# Patient Record
Sex: FEMALE | Race: WHITE | Employment: UNEMPLOYED | URBAN - NONMETROPOLITAN AREA
[De-identification: names, ages, dates, MRNs, and addresses within clinical notes are randomized per-mention and may not be internally consistent; named-entity substitution may affect disease eponyms.]

---

## 2022-12-20 ENCOUNTER — OUTSIDE SERVICES (OUTPATIENT)
Dept: FAMILY MEDICINE CLINIC | Age: 42
End: 2022-12-20

## 2022-12-20 VITALS
TEMPERATURE: 97.5 F | WEIGHT: 149 LBS | BODY MASS INDEX: 29.1 KG/M2 | SYSTOLIC BLOOD PRESSURE: 128 MMHG | OXYGEN SATURATION: 96 % | DIASTOLIC BLOOD PRESSURE: 68 MMHG | RESPIRATION RATE: 16 BRPM | HEART RATE: 74 BPM

## 2022-12-20 DIAGNOSIS — I10 PRIMARY HYPERTENSION: ICD-10-CM

## 2022-12-20 DIAGNOSIS — Z86.73 HISTORY OF CVA (CEREBROVASCULAR ACCIDENT): Primary | ICD-10-CM

## 2022-12-20 DIAGNOSIS — F42.8 OTHER OBSESSIVE-COMPULSIVE DISORDERS: ICD-10-CM

## 2022-12-20 DIAGNOSIS — I69.354 FLACCID HEMIPLEGIA OF LEFT NONDOMINANT SIDE AS LATE EFFECT OF CEREBRAL INFARCTION (HCC): ICD-10-CM

## 2022-12-20 DIAGNOSIS — E78.00 PURE HYPERCHOLESTEROLEMIA: ICD-10-CM

## 2022-12-20 DIAGNOSIS — M15.8 OTHER OSTEOARTHRITIS INVOLVING MULTIPLE JOINTS: ICD-10-CM

## 2022-12-20 DIAGNOSIS — I69.391 DYSPHAGIA AS LATE EFFECT OF CEREBROVASCULAR ACCIDENT (CVA): ICD-10-CM

## 2022-12-20 DIAGNOSIS — E08.00 DIABETES MELLITUS DUE TO UNDERLYING CONDITION WITH HYPEROSMOLARITY WITHOUT COMA, WITHOUT LONG-TERM CURRENT USE OF INSULIN (HCC): ICD-10-CM

## 2022-12-20 ASSESSMENT — ENCOUNTER SYMPTOMS
WHEEZING: 0
COUGH: 0
DIARRHEA: 0
NAUSEA: 0
RHINORRHEA: 0
SORE THROAT: 0
SHORTNESS OF BREATH: 0
VOMITING: 0
CONSTIPATION: 0
EYE REDNESS: 0

## 2022-12-20 NOTE — PROGRESS NOTES
Zeina Ybarra is a 43 y.o. female who presents today for her medical conditions/complaints as noted below. Chief Complaint   Patient presents with    1 Month Follow-Up     Federally mandated 30 day visit           HPI:     Patient seen and examined today for follow-up on her chronic health conditions. She is in her room in her bed and complains of shoulder pain. Patient does have Tylenol available and it is too soon to take again. Patient has chronic health conditions of CVA, dysphagia, hemiplegia, diabetes, hyperlipidemia, hypertension, obsessive-compulsive disorder, osteoarthritis. Patient was concerned about possibly being in menopause and labs were ordered. These have not been obtained as of yet. We will await the results. Patient does not complain of anything today other than pain in her shoulders. She would like Sarna lotion to her skin and will order this today.     Past Medical History:   Diagnosis Date    Depression     OCD (obsessive compulsive disorder)     Type II or unspecified type diabetes mellitus without mention of complication, not stated as uncontrolled       Past Surgical History:   Procedure Laterality Date    WRIST SURGERY         Family History   Problem Relation Age of Onset    Stroke Mother     Cancer Mother     Other Mother         aneurysm    Diabetes Mother     High Blood Pressure Mother     Diabetes Father        Social History     Tobacco Use    Smoking status: Never     Passive exposure: Yes    Smokeless tobacco: Never   Substance Use Topics    Alcohol use: Yes     Comment: rarely      Allergies   Allergen Reactions    Dust Mite Extract     Molds & Smuts     Ranitidine Hcl        Health Maintenance   Topic Date Due    COVID-19 Vaccine (1) Never done    Varicella vaccine (1 of 2 - 2-dose childhood series) Never done    Depression Monitoring  Never done    HIV screen  Never done    Hepatitis C screen  Never done    Cervical cancer screen  Never done    DTaP/Tdap/Td vaccine (2 - Td or Tdap) 03/12/2020    Lipids  07/09/2020    Flu vaccine (1) Never done    Hepatitis A vaccine  Aged Out    Hib vaccine  Aged Out    Meningococcal (ACWY) vaccine  Aged Out    Pneumococcal 0-64 years Vaccine  Aged Out       Subjective:      Review of Systems   Constitutional:  Negative for chills, fatigue and fever. HENT:  Negative for congestion, rhinorrhea and sore throat. Eyes:  Negative for redness and visual disturbance. Respiratory:  Negative for cough, shortness of breath and wheezing. Cardiovascular:  Negative for chest pain and leg swelling. Gastrointestinal:  Negative for constipation, diarrhea, nausea and vomiting. Endocrine: Negative for polydipsia and polyuria. Genitourinary:  Negative for dysuria, frequency and hematuria. Musculoskeletal:  Positive for arthralgias and gait problem. Negative for myalgias. Skin:  Negative for rash and wound. Allergic/Immunologic: Negative for environmental allergies and immunocompromised state. Neurological:  Positive for weakness. Negative for dizziness, light-headedness and headaches. Hematological:  Does not bruise/bleed easily. Psychiatric/Behavioral:  Negative for dysphoric mood and sleep disturbance. The patient is not nervous/anxious. Objective:     Physical Exam  Vitals and nursing note reviewed. Constitutional:       General: She is not in acute distress. Appearance: She is well-developed. HENT:      Head: Normocephalic and atraumatic. Right Ear: External ear normal.      Left Ear: External ear normal.      Nose: Nose normal.   Eyes:      General: Visual field deficit present. No scleral icterus. Right eye: No discharge. Left eye: No discharge. Conjunctiva/sclera: Conjunctivae normal.   Neck:      Thyroid: No thyromegaly. Vascular: No JVD. Cardiovascular:      Rate and Rhythm: Normal rate and regular rhythm. Heart sounds: Normal heart sounds.    Pulmonary:      Effort: Pulmonary effort is normal. No respiratory distress. Breath sounds: Normal breath sounds. No stridor. No wheezing or rales. Abdominal:      General: Bowel sounds are normal. There is no distension. Palpations: Abdomen is soft. Tenderness: There is no abdominal tenderness. Musculoskeletal:         General: No deformity. Normal range of motion. Right shoulder: No tenderness or bony tenderness. Left shoulder: No tenderness or bony tenderness. Cervical back: Neck supple. No tenderness or bony tenderness. Thoracic back: No spasms, tenderness or bony tenderness. Lumbar back: No tenderness or bony tenderness. Lymphadenopathy:      Cervical: No cervical adenopathy. Upper Body:      Right upper body: No supraclavicular adenopathy. Left upper body: No supraclavicular adenopathy. Skin:     General: Skin is warm and dry. Findings: No erythema or rash. Neurological:      Mental Status: She is alert and oriented to person, place, and time. Cranial Nerves: Cranial nerve deficit, dysarthria and facial asymmetry present. Sensory: Sensory deficit present. Motor: Weakness, atrophy and abnormal muscle tone present. No seizure activity. Psychiatric:         Speech: Speech normal.         Behavior: Behavior normal.     /68   Pulse 74   Temp 97.5 °F (36.4 °C)   Resp 16   Wt 149 lb (67.6 kg)   SpO2 96%   BMI 29.10 kg/m²     Assessment/Plan      1. History of CVA (cerebrovascular accident)    2. Dysphagia as late effect of cerebrovascular accident (CVA)    3. 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 Diabetes mellitus due to underlying condition with hyperosmolarity without coma, without long-term current use of insulin ([address]    5. Pure hypercholesterolemia    6. Primary hypertension    7. Other obsessive-compulsive disorders    8. Other osteoarthritis involving multiple joints    1.   Blood pressures remain controlled. Heart rate has stabilized since increasing metoprolol. Continue this. 2.  Chronic and continue to assist with meals. Her weight is down some but she did have an acute illness recently and was in the hospital.  We will continue to monitor weights. 3.  Continue supportive care. Patient is wheelchair-bound. 4.  Chronic and continue to monitor blood sugars. Continue metformin and Januvia. 5.  Chronic and continue atorvastatin, aspirin, heart healthy diet. Labs as directed. 6.  Blood pressures improved with recent increase in metoprolol. Continue lisinopril. 7.  Controlled without medications. Continue to monitor and support. 8.  Chronic and continue Tylenol. Is complaining of shoulder pain today. Patient seen and examined. History partially obtained by chart review and nursing notes. I have reviewed patient's past medical, surgical, social, and family history and have made updates where appropriate. See facility EMR for updated medication list.      More than 50% of the total visit time must involve counseling/coordination of care. The total visit time encompasses both the face-to-face time spent with the patient at the bedside and the additional time spent on the unit/floor reviewing data, obtaining relevant patient information, and discussing the case with other involved healthcare providers. On this date 12/20/22I have spent 25 minutes reviewing previous notes, test results and face to face with the patient discussing the diagnosis and importance of compliance with the treatment plan as well as documenting on the day of the visit. Resident has HTN, DM, HLD, OA and it is expected to last 12 or more months. These chronic conditions place resident at a significant risk of death, acute exacerbation, decline in function or functional decline. The comprehensive plan was established, implemented, revised or monitored today and were provided a copy if requested from the facility.   I spent 20 minutes reviewing plan. Patient or designee were informed of the role of chronic care management services and gave verbal consent to accept these services.            Electronically signed by Dora Leahy MD on 12/20/2022 at 4:25 PM

## 2023-01-17 ENCOUNTER — OUTSIDE SERVICES (OUTPATIENT)
Dept: FAMILY MEDICINE CLINIC | Age: 43
End: 2023-01-17

## 2023-01-17 VITALS
DIASTOLIC BLOOD PRESSURE: 70 MMHG | HEART RATE: 68 BPM | WEIGHT: 141.2 LBS | OXYGEN SATURATION: 96 % | SYSTOLIC BLOOD PRESSURE: 110 MMHG | BODY MASS INDEX: 27.58 KG/M2 | RESPIRATION RATE: 18 BRPM | TEMPERATURE: 97.6 F

## 2023-01-17 DIAGNOSIS — I10 PRIMARY HYPERTENSION: ICD-10-CM

## 2023-01-17 DIAGNOSIS — Z86.73 HISTORY OF CVA (CEREBROVASCULAR ACCIDENT): Primary | ICD-10-CM

## 2023-01-17 DIAGNOSIS — M15.8 OTHER OSTEOARTHRITIS INVOLVING MULTIPLE JOINTS: ICD-10-CM

## 2023-01-17 DIAGNOSIS — I69.354 FLACCID HEMIPLEGIA OF LEFT NONDOMINANT SIDE AS LATE EFFECT OF CEREBRAL INFARCTION (HCC): ICD-10-CM

## 2023-01-17 DIAGNOSIS — F42.8 OTHER OBSESSIVE-COMPULSIVE DISORDERS: ICD-10-CM

## 2023-01-17 DIAGNOSIS — E78.00 PURE HYPERCHOLESTEROLEMIA: ICD-10-CM

## 2023-01-17 DIAGNOSIS — I69.391 DYSPHAGIA AS LATE EFFECT OF CEREBROVASCULAR ACCIDENT (CVA): ICD-10-CM

## 2023-01-17 DIAGNOSIS — E08.00 DIABETES MELLITUS DUE TO UNDERLYING CONDITION WITH HYPEROSMOLARITY WITHOUT COMA, WITHOUT LONG-TERM CURRENT USE OF INSULIN (HCC): ICD-10-CM

## 2023-01-17 ASSESSMENT — ENCOUNTER SYMPTOMS
VOMITING: 0
DIARRHEA: 0
NAUSEA: 0
SORE THROAT: 0
COUGH: 0
WHEEZING: 0
CONSTIPATION: 0
SHORTNESS OF BREATH: 0
EYE REDNESS: 0
RHINORRHEA: 0

## 2023-01-17 NOTE — PROGRESS NOTES
Michael Guzmán is a 43 y.o. female who presents today for her medical conditions/complaints as noted below. Chief Complaint   Patient presents with    1 Month Follow-Up     Federally mandated 30 day visit           HPI:     Patient seen and examined today for follow-up of chronic health conditions. She is in her room and in her bed and denies any complaints. She is chronic conditions of CVA with dysphagia and aphasia along with hemiplegia. Patient also has diabetes, hyperlipidemia, hypertension, OCD, osteoarthritis. Patient had labs on 1/11/2023 and her hemoglobin A1c was 6.3. She also had a TSH on 1/9/2023 and this was 1.182. She has had no recent falls.     Past Medical History:   Diagnosis Date    Depression     OCD (obsessive compulsive disorder)     Type II or unspecified type diabetes mellitus without mention of complication, not stated as uncontrolled       Past Surgical History:   Procedure Laterality Date    WRIST SURGERY         Family History   Problem Relation Age of Onset    Stroke Mother     Cancer Mother     Other Mother         aneurysm    Diabetes Mother     High Blood Pressure Mother     Diabetes Father        Social History     Tobacco Use    Smoking status: Never     Passive exposure: Yes    Smokeless tobacco: Never   Substance Use Topics    Alcohol use: Yes     Comment: rarely      Allergies   Allergen Reactions    Dust Mite Extract     Molds & Smuts     Ranitidine Hcl        Health Maintenance   Topic Date Due    COVID-19 Vaccine (1) Never done    Varicella vaccine (1 of 2 - 2-dose childhood series) Never done    Diabetic foot exam  Never done    Depression Monitoring  Never done    HIV screen  Never done    Diabetic Alb to Cr ratio (uACR) test  Never done    Diabetic retinal exam  Never done    GFR test (Diabetes, CKD 3-4, OR last GFR 15-59)  Never done    Hepatitis C screen  Never done    Cervical cancer screen  Never done    DTaP/Tdap/Td vaccine (2 - Td or Tdap) 03/12/2020 Lipids  07/09/2020    Flu vaccine (1) Never done    Hepatitis A vaccine  Aged Out    Hib vaccine  Aged Out    Meningococcal (ACWY) vaccine  Aged Out    Pneumococcal 0-64 years Vaccine  Aged Out       Subjective:      Review of Systems   Constitutional:  Negative for chills, fatigue and fever. HENT:  Negative for congestion, rhinorrhea and sore throat. Eyes:  Negative for redness and visual disturbance. Respiratory:  Negative for cough, shortness of breath and wheezing. Cardiovascular:  Negative for chest pain and leg swelling. Gastrointestinal:  Negative for constipation, diarrhea, nausea and vomiting. Endocrine: Negative for polydipsia and polyuria. Genitourinary:  Negative for dysuria, frequency and hematuria. Musculoskeletal:  Negative for arthralgias, gait problem and myalgias. Skin:  Negative for rash and wound. Allergic/Immunologic: Negative for environmental allergies and immunocompromised state. Neurological:  Negative for dizziness, light-headedness and headaches. Hematological:  Does not bruise/bleed easily. Psychiatric/Behavioral:  Negative for dysphoric mood and sleep disturbance. The patient is not nervous/anxious. Objective:     Physical Exam  Vitals and nursing note reviewed. Constitutional:       General: She is not in acute distress. Appearance: She is well-developed. She is ill-appearing (chronically). HENT:      Head: Normocephalic and atraumatic. Right Ear: External ear normal.      Left Ear: External ear normal.      Nose: Nose normal.   Eyes:      General: No scleral icterus. Right eye: No discharge. Left eye: No discharge. Conjunctiva/sclera: Conjunctivae normal.   Neck:      Thyroid: No thyromegaly. Vascular: No JVD. Cardiovascular:      Rate and Rhythm: Normal rate and regular rhythm. Heart sounds: Normal heart sounds. Pulmonary:      Effort: Pulmonary effort is normal. No respiratory distress.       Breath sounds: Normal breath sounds. No stridor. No wheezing or rales. Abdominal:      General: Bowel sounds are normal. There is no distension. Palpations: Abdomen is soft. Tenderness: There is no abdominal tenderness. Musculoskeletal:         General: No deformity. Right shoulder: No tenderness or bony tenderness. Decreased range of motion. Decreased strength. Left shoulder: No tenderness or bony tenderness. Right hand: Decreased range of motion. Decreased strength. Normal strength of finger abduction. Decreased sensation. Cervical back: Neck supple. No tenderness or bony tenderness. Thoracic back: No spasms, tenderness or bony tenderness. Lumbar back: No tenderness or bony tenderness. Right hip: Decreased strength. Right foot: Decreased range of motion. Lymphadenopathy:      Cervical: No cervical adenopathy. Upper Body:      Right upper body: No supraclavicular adenopathy. Left upper body: No supraclavicular adenopathy. Skin:     General: Skin is warm and dry. Findings: No erythema or rash. Neurological:      Mental Status: She is alert and oriented to person, place, and time. Motor: No atrophy, abnormal muscle tone or seizure activity. Psychiatric:         Speech: Speech normal.         Behavior: Behavior normal.     /70   Pulse 68   Temp 97.6 °F (36.4 °C)   Resp 18   Wt 141 lb 3.2 oz (64 kg)   SpO2 96%   BMI 27.58 kg/m²     Assessment/Plan      1. History of CVA (cerebrovascular accident)    2. Dysphagia as late effect of cerebrovascular accident (CVA)    3. 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 Diabetes mellitus due to underlying condition with hyperosmolarity without coma, without long-term current use of insulin ([address]    5. Pure hypercholesterolemia    6. Primary hypertension    7. Other obsessive-compulsive disorders    8.  Other osteoarthritis involving multiple joints    1. Blood pressures remain controlled. Continue supportive care. Continue metoprolol, atorvastatin, aspirin. Patient does have an area on her right buttock and treatment now in place. 2.  Continue to monitor swallow. Weight remains stable but down 8 pounds. Feel reduction in her weight is overall beneficial for her health. 3.  Continue supportive care. Patient is wheelchair-bound. 4.  A1c 6.3 on metformin and Januvia. Continue to monitor labs. 5.  Chronic and continue statin, aspirin, heart healthy diet. Weight loss is beneficial.  6.  Blood pressures controlled on metoprolol. Continue to monitor. 7.  Controlled without medications. 8.  Chronic and continue Tylenol. Denies pain today. Patient seen and examined. History partially obtained by chart review and nursing notes. I have reviewed patient's past medical, surgical, social, and family history and have made updates where appropriate. See facility EMR for updated medication list.      More than 50% of the total visit time must involve counseling/coordination of care. The total visit time encompasses both the face-to-face time spent with the patient at the bedside and the additional time spent on the unit/floor reviewing data, obtaining relevant patient information, and discussing the case with other involved healthcare providers. Resident has DM, HTN, HLD, OA and it is expected to last 12 or more months. These chronic conditions place resident at a significant risk of death, acute exacerbation, decline in function or functional decline. The comprehensive plan was established, implemented, revised or monitored today and were provided a copy if requested from the facility. I spent 20 minutes reviewing plan. Patient or designee were informed of the role of chronic care management services and gave verbal consent to accept these services.            Electronically signed by ERROL Fonseca CNP on 1/17/2023 at 10:27 AM

## 2023-03-13 ENCOUNTER — TELEPHONE (OUTPATIENT)
Dept: FAMILY MEDICINE CLINIC | Age: 43
End: 2023-03-13

## 2024-03-28 ENCOUNTER — TELEPHONE (OUTPATIENT)
Age: 44
End: 2024-03-28

## 2024-05-09 ENCOUNTER — HOSPITAL ENCOUNTER (OUTPATIENT)
Dept: MRI IMAGING | Age: 44
Discharge: HOME | End: 2024-05-09
Attending: RADIOLOGY

## 2024-05-09 ENCOUNTER — OFFICE VISIT (OUTPATIENT)
Dept: NEUROSURGERY | Age: 44
End: 2024-05-09
Payer: MEDICARE

## 2024-05-09 VITALS
WEIGHT: 131.2 LBS | HEIGHT: 60 IN | BODY MASS INDEX: 25.76 KG/M2 | DIASTOLIC BLOOD PRESSURE: 88 MMHG | HEART RATE: 72 BPM | SYSTOLIC BLOOD PRESSURE: 136 MMHG

## 2024-05-09 DIAGNOSIS — Z00.6 EXAMINATION FOR NORMAL COMPARISON FOR CLINICAL RESEARCH: ICD-10-CM

## 2024-05-09 DIAGNOSIS — D32.9 MENINGIOMA (HCC): Primary | ICD-10-CM

## 2024-05-09 PROCEDURE — 99204 OFFICE O/P NEW MOD 45 MIN: CPT

## 2024-05-09 RX ORDER — TRIAMCINOLONE ACETONIDE 5 MG/G
0.5 CREAM TOPICAL 2 TIMES DAILY
COMMUNITY
Start: 2023-07-20

## 2024-05-09 RX ORDER — INSULIN GLARGINE 100 [IU]/ML
100 INJECTION, SOLUTION SUBCUTANEOUS NIGHTLY
COMMUNITY

## 2024-05-09 RX ORDER — INSULIN ASPART 100 [IU]/ML
100 INJECTION, SOLUTION INTRAVENOUS; SUBCUTANEOUS
COMMUNITY
Start: 2023-07-20

## 2024-05-09 RX ORDER — BISACODYL 10 MG
10 SUPPOSITORY, RECTAL RECTAL DAILY PRN
COMMUNITY

## 2024-05-09 RX ORDER — PANTOPRAZOLE SODIUM 40 MG/1
40 TABLET, DELAYED RELEASE ORAL DAILY
COMMUNITY
Start: 2023-07-20

## 2024-05-09 RX ORDER — IBUPROFEN 400 MG/1
400 TABLET ORAL EVERY 6 HOURS PRN
COMMUNITY

## 2024-05-09 RX ORDER — ACETAMINOPHEN 500 MG
500 TABLET ORAL EVERY 6 HOURS PRN
COMMUNITY

## 2024-05-09 RX ORDER — BACLOFEN 5 MG/1
5 TABLET ORAL 3 TIMES DAILY
COMMUNITY

## 2024-05-09 RX ORDER — OXYBUTYNIN CHLORIDE 5 MG/1
5 TABLET ORAL 2 TIMES DAILY
COMMUNITY
Start: 2024-03-14

## 2024-05-09 RX ORDER — AMOXICILLIN 250 MG
1 CAPSULE ORAL DAILY
COMMUNITY

## 2024-05-09 RX ORDER — NYSTATIN 100000 U/G
100000 OINTMENT TOPICAL 2 TIMES DAILY
COMMUNITY

## 2024-05-09 RX ORDER — ATORVASTATIN CALCIUM 80 MG/1
80 TABLET, FILM COATED ORAL DAILY
COMMUNITY
Start: 2023-07-20

## 2024-05-09 RX ORDER — ONDANSETRON 4 MG/1
4 TABLET, FILM COATED ORAL EVERY 8 HOURS PRN
COMMUNITY

## 2024-05-09 RX ORDER — ERGOCALCIFEROL 1.25 MG/1
50000 CAPSULE ORAL
COMMUNITY
Start: 2015-03-13

## 2024-05-09 RX ORDER — LINAGLIPTIN 5 MG/1
5 TABLET, FILM COATED ORAL DAILY
COMMUNITY
Start: 2023-07-20

## 2024-05-09 RX ORDER — FAMOTIDINE 40 MG/1
40 TABLET, FILM COATED ORAL 2 TIMES DAILY
COMMUNITY

## 2024-05-09 RX ORDER — FENOFIBRATE 150 MG/1
150 CAPSULE ORAL DAILY
COMMUNITY

## 2024-05-09 RX ORDER — FEXOFENADINE HCL 180 MG/1
180 TABLET ORAL DAILY
COMMUNITY

## 2024-05-09 RX ORDER — POLYETHYLENE GLYCOL 3350 17 G/17G
17 POWDER, FOR SOLUTION ORAL DAILY
COMMUNITY
Start: 2023-07-20

## 2024-05-09 NOTE — PROGRESS NOTES
[facility]  [facility]  [address] [address] [phone]       Patient Name:  Radha Yousif  Visit Date:  5/9/2024    HPI:     Chief Complaint   Patient presents with    New Patient     Meningioma        HPI   Ms. Yousif is a 43 y.o. female that presents today at [facility] with a referral from Dr. Cruz to evaluate Meningioma.  PMHX: depression, diabetes, multiple CVAs, bells palsy (L side).      Patient arrives in an electric wheelchair and accompanied by her mother. She resides at [facility]. Patient is nonverbal, but does use an Ipad for communication, additional history was obtained from her mother. Patient notes chronic left sided weakness, even prior to her CVA related to a bells palsy. She notes her biggest complaint is a constant headache. It has been worsening over the last month and is mostly located on the right side of her head. She does take Excedrin for migraines, which provides some relief. She also endorses blurry vision. Denies double visiion, dizziness or lightheadedness.     Brain MRI with and without contrast 12/29/2023  Extra-axial enhancing mass involving left anterior clinoid process extending along the floor of the left anterior cranial fossa as well as the anterior aspect of the left middle cranial fossa.  This is most compatible with meningioma.  No evidence of involvement on the left cavernous sinus.  Chronic areas of infarcts as well as mild global volume loss noted         Medications:    Current Outpatient Medications:     acetaminophen (TYLENOL) 500 MG tablet, Take 1 tablet by mouth every 6 hours as needed for Pain, Disp: , Rfl:     Aspirin 81 MG CAPS, Take 81 mg by mouth, Disp: , Rfl:     EXCEDRIN MIGRAINE 250-250-65 MG per tablet, Take 1 tablet by mouth every 6 hours as needed for Pain or Headaches, Disp: , Rfl:     atorvastatin (LIPITOR) 80 MG tablet, Take 1 tablet by mouth daily, Disp: , Rfl:     Baclofen

## 2024-05-09 NOTE — PATIENT INSTRUCTIONS
- Meningioma  - eye doctor to evaluate for papilledema (which can be a sign of increased pressure inside the head)

## 2024-05-24 ENCOUNTER — HOSPITAL ENCOUNTER (OUTPATIENT)
Dept: CT IMAGING | Age: 44
Discharge: HOME | End: 2024-05-24
Payer: COMMERCIAL

## 2024-05-24 ENCOUNTER — HOSPITAL ENCOUNTER (OUTPATIENT)
Dept: MRI IMAGING | Age: 44
Discharge: HOME | End: 2024-05-24
Payer: COMMERCIAL

## 2024-05-24 DIAGNOSIS — D32.9 MENINGIOMA (HCC): ICD-10-CM

## 2024-05-24 LAB — POC CREATININE WHOLE BLOOD: 1.1 MG/DL (ref 0.5–1.2)

## 2024-05-24 PROCEDURE — 70546 MR ANGIOGRAPH HEAD W/O&W/DYE: CPT

## 2024-05-24 PROCEDURE — 6360000004 HC RX CONTRAST MEDICATION

## 2024-05-24 PROCEDURE — 82565 ASSAY OF CREATININE: CPT

## 2024-05-24 PROCEDURE — 70553 MRI BRAIN STEM W/O & W/DYE: CPT

## 2024-05-24 PROCEDURE — 70549 MR ANGIOGRAPH NECK W/O&W/DYE: CPT

## 2024-05-24 PROCEDURE — A9579 GAD-BASE MR CONTRAST NOS,1ML: HCPCS

## 2024-05-24 PROCEDURE — 70450 CT HEAD/BRAIN W/O DYE: CPT

## 2024-05-24 RX ADMIN — GADOTERIDOL 15 ML: 279.3 INJECTION, SOLUTION INTRAVENOUS at 12:08

## 2024-06-23 NOTE — PROGRESS NOTES
has a past medical history of Depression, OCD (obsessive compulsive disorder), and Type II or unspecified type diabetes mellitus without mention of complication, not stated as uncontrolled.    Past Surgical History  The patient  has a past surgical history that includes Wrist surgery.    Family History  This patient's family history includes Cancer in her mother; Diabetes in her father and mother; High Blood Pressure in her mother; Other in her mother; Stroke in her mother.    Social History  Radha  reports that she has never smoked. She has been exposed to tobacco smoke. She has never used smokeless tobacco. She reports current alcohol use. She reports that she does not use drugs.      Subjective:      Review of Systems   Constitutional:  Negative for activity change, chills and fever.   Eyes:  Positive for visual disturbance (blurry vision).   Musculoskeletal:  Positive for gait problem.   Skin:  Negative for wound.   Neurological:  Positive for weakness, numbness and headaches. Negative for dizziness and light-headedness.   Psychiatric/Behavioral:  Negative for agitation and confusion.        Objective:     BP 92/65 (Site: Right Upper Arm, Position: Sitting, Cuff Size: Large Adult)   Pulse 95   Ht 1.524 m (5')   Wt 58.5 kg (129 lb)   BMI 25.19 kg/m²   Physical Exam  Constitutional:       Appearance: She is not toxic-appearing.      Comments: Sitting in wheelchair, utilizing writing pad for communication    HENT:      Head: Normocephalic and atraumatic.      Mouth/Throat:      Mouth: Mucous membranes are moist.   Eyes:      Extraocular Movements: Extraocular movements intact.      Conjunctiva/sclera: Conjunctivae normal.      Pupils: Pupils are equal, round, and reactive to light.   Pulmonary:      Effort: Pulmonary effort is normal. No respiratory distress.   Skin:     General: Skin is warm and dry.   Neurological:      Mental Status: She is alert and oriented to person, place, and time.      Motor:

## 2024-06-24 ENCOUNTER — OFFICE VISIT (OUTPATIENT)
Dept: NEUROSURGERY | Age: 44
End: 2024-06-24
Payer: COMMERCIAL

## 2024-06-24 VITALS
HEIGHT: 60 IN | DIASTOLIC BLOOD PRESSURE: 65 MMHG | HEART RATE: 95 BPM | BODY MASS INDEX: 25.32 KG/M2 | WEIGHT: 129 LBS | SYSTOLIC BLOOD PRESSURE: 92 MMHG

## 2024-06-24 DIAGNOSIS — D32.9 MENINGIOMA (HCC): Primary | ICD-10-CM

## 2024-06-24 PROCEDURE — 99214 OFFICE O/P EST MOD 30 MIN: CPT

## 2024-06-24 RX ORDER — EZETIMIBE 10 MG/1
10 TABLET ORAL DAILY
COMMUNITY

## 2024-06-24 RX ORDER — LEVOFLOXACIN 5 MG/ML
500 INJECTION, SOLUTION INTRAVENOUS
COMMUNITY

## 2024-10-23 LAB
ALBUMIN: 3.1 G/DL
BUN / CREAT RATIO: 8
BUN BLDV-MCNC: 6 MG/DL
CALCIUM SERPL-MCNC: 9.3 MG/DL
CHLORIDE BLD-SCNC: 105 MMOL/L
CO2: 21 MMOL/L
CREAT SERPL-MCNC: 0.8 MG/DL
GFR, ESTIMATED: 78
GLUCOSE: 84
PHOSPHORUS: 3.9 MG/DL
POTASSIUM SERPL-SCNC: 4 MMOL/L
SODIUM BLD-SCNC: 143 MMOL/L

## 2024-12-10 ENCOUNTER — OFFICE VISIT (OUTPATIENT)
Age: 44
End: 2024-12-10
Payer: COMMERCIAL

## 2024-12-10 VITALS — SYSTOLIC BLOOD PRESSURE: 124 MMHG | DIASTOLIC BLOOD PRESSURE: 82 MMHG | HEART RATE: 102 BPM

## 2024-12-10 DIAGNOSIS — E11.65 TYPE 2 DIABETES MELLITUS WITH HYPERGLYCEMIA, WITHOUT LONG-TERM CURRENT USE OF INSULIN (HCC): Primary | ICD-10-CM

## 2024-12-10 PROCEDURE — 99204 OFFICE O/P NEW MOD 45 MIN: CPT | Performed by: INTERNAL MEDICINE

## 2024-12-10 RX ORDER — TAMSULOSIN HYDROCHLORIDE 0.4 MG/1
0.4 CAPSULE ORAL DAILY
COMMUNITY

## 2024-12-10 RX ORDER — MIRTAZAPINE 15 MG/1
15 TABLET, FILM COATED ORAL NIGHTLY
COMMUNITY

## 2024-12-10 RX ORDER — MIDODRINE HYDROCHLORIDE 10 MG/1
10 TABLET ORAL 3 TIMES DAILY
COMMUNITY
Start: 2024-11-06

## 2024-12-10 RX ORDER — AMITRIPTYLINE HYDROCHLORIDE 10 MG/1
TABLET ORAL
COMMUNITY
Start: 2024-11-13 | End: 2024-12-12

## 2024-12-10 RX ORDER — FERROUS SULFATE 325(65) MG
325 TABLET ORAL
COMMUNITY

## 2024-12-10 RX ORDER — CALCIUM CARBONATE/VITAMIN D3 600 MG-10
TABLET ORAL
COMMUNITY

## 2024-12-10 NOTE — PATIENT INSTRUCTIONS
1.  Get fingerstick blood sugars every day for the next month and send readings in 1 month.  2. return to clinic in 3 months obtain CMP, lipids, C-peptide, urine microalbumin, free T4 and TSH ASAP  3.  Return to clinic in 3 months.

## 2024-12-16 ENCOUNTER — HOSPITAL ENCOUNTER (OUTPATIENT)
Dept: MRI IMAGING | Age: 44
Discharge: HOME | End: 2024-12-16
Attending: RADIOLOGY

## 2024-12-16 ENCOUNTER — HOSPITAL ENCOUNTER (OUTPATIENT)
Dept: CT IMAGING | Age: 44
Discharge: HOME | End: 2024-12-16
Attending: RADIOLOGY

## 2024-12-16 DIAGNOSIS — Z00.6 EXAMINATION FOR NORMAL COMPARISON FOR CLINICAL RESEARCH: ICD-10-CM

## 2024-12-19 ENCOUNTER — OFFICE VISIT (OUTPATIENT)
Dept: NEUROSURGERY | Age: 44
End: 2024-12-19
Payer: COMMERCIAL

## 2024-12-19 VITALS — DIASTOLIC BLOOD PRESSURE: 76 MMHG | SYSTOLIC BLOOD PRESSURE: 128 MMHG

## 2024-12-19 DIAGNOSIS — D32.9 MENINGIOMA (HCC): Primary | ICD-10-CM

## 2024-12-19 PROCEDURE — 99213 OFFICE O/P EST LOW 20 MIN: CPT | Performed by: PHYSICIAN ASSISTANT

## 2024-12-19 NOTE — PROGRESS NOTES
[facility] [place]  [facility]  [address]  [facility] [address]  Dept: [phone]  Dept Fax: [phone]  Loc: [phone]    Follow-up visit  Visit Date: 12/19/2024      Radha  is a 44 y.o. female who is returning to the office today for a follow-up visit for continuing evaluation of findings consistent with meningioma.  She was most recently seen and evaluated in our office setting on 6/24/2024 by [facility] neuro[facility] with an MRI of the brain image with and without contrast as a comparison to prior imaging noted to be unchanged.  Today's 6-month follow-up is to include a new MRI of the brain with and without contrast to rule out any evolution of known findings.    She arrives today having undergone an MRI of the brain with and without contrast on 12/9/2024 which compares favorably to prior imaging absence any significant changes.  She arrives today accompanied by family and in a wheelchair per her baseline absence any significant changes since her prior evaluation.  We reviewed the above referenced imaging and absent any significant changes in the imaging of agreed to follow her annually with a repeat of the MRI with and without contrast to be done in 1 year corresponding to that follow-up.  They are encouraged in the interim to contact our service with any additional questions or concerns or should she experience any significant changes.  They remain happy with that plan moving forward and look forward to our next annual follow-up     Patient was evaluated today and is doing well overall.  No new complaints were voiced.  Patient  lives with their family  Wound: none  Follow-up Studies: No orders of the defined types were placed in this encounter.       Assessment/Plan:  Status Post meningioma  Doing well overall  Encouraged gradual increase in physical and mental activity.  Fall precaution and home safety education provided to

## 2025-03-12 LAB
BUN BLDV-MCNC: 33 MG/DL
CALCIUM SERPL-MCNC: 13.6 MG/DL
CHLORIDE BLD-SCNC: 104 MMOL/L
CO2: 28 MMOL/L
CREAT SERPL-MCNC: 1.5 MG/DL
EGFR: 38
GLUCOSE BLD-MCNC: 195 MG/DL
POTASSIUM SERPL-SCNC: 4.3 MMOL/L
SODIUM BLD-SCNC: 142 MMOL/L

## 2025-03-18 ENCOUNTER — RESULTS FOLLOW-UP (OUTPATIENT)
Dept: FAMILY MEDICINE CLINIC | Age: 45
End: 2025-03-18

## 2025-03-25 ENCOUNTER — OFFICE VISIT (OUTPATIENT)
Age: 45
End: 2025-03-25

## 2025-03-25 VITALS
HEIGHT: 60 IN | SYSTOLIC BLOOD PRESSURE: 110 MMHG | BODY MASS INDEX: 22.77 KG/M2 | DIASTOLIC BLOOD PRESSURE: 82 MMHG | HEART RATE: 82 BPM

## 2025-03-25 DIAGNOSIS — E11.65 TYPE 2 DIABETES MELLITUS WITH HYPERGLYCEMIA, WITHOUT LONG-TERM CURRENT USE OF INSULIN (HCC): Primary | ICD-10-CM

## 2025-03-25 DIAGNOSIS — E83.52 HYPERCALCEMIA: ICD-10-CM

## 2025-03-25 RX ORDER — EVOLOCUMAB 140 MG/ML
INJECTION, SOLUTION SUBCUTANEOUS
COMMUNITY

## 2025-03-25 RX ORDER — INSULIN ASPART INJECTION 100 [IU]/ML
INJECTION, SOLUTION SUBCUTANEOUS
COMMUNITY

## 2025-03-25 NOTE — PROGRESS NOTES
Heart was examined.  Gastrointestinal: Feeding tube is in place.  Extremities: No swelling in the legs.  Skin: Warm and dry, no rash.  Neurological: Normal.     General: alert, appears stated age, cooperative and no distress   Eyes: negative findings: lids and lashes normal, conjunctivae and sclerae normal, corneas clear and pupils equal, round, reactive to light and accomodation  Neck:no adenopathy, no carotid bruit, no JVD and supple, symmetrical, trachea midline  Thyroid: There is no goiter or thyroid tenderness.  Lung:clear to auscultation bilaterally  Heart: regular rate and rhythm, S1, S2 normal, no murmur, click, rub or gallop and normal apical impulse  Abdomen:soft, non-tender; bowel sounds normal; no masses,  no organomegaly  Extremities:extremities normal, atraumatic, no cyanosis or edema and Homans sign is negative, no sign of DVT  Pulses:2+ and symmetric  Skin:warm and dry, no hyperpigmentation, vitiligo, or suspicious lesions  Neuro:normal without focal findings, mental status, speech normal, alert and oriented x3, JOSETTE, cranial nerves 2-12 intact, muscle tone and strength normal and symmetric.  Lymph nodes: There is no cervical, supraclavicular or submental adenopathy.  Musculoskeletal:  No joint swelling or deformity.  Psychiatry: Alert and oriented ×3.  Making good eye contact.  Affect is normal.        Assessment/Plan:  Assessment & Plan  1. Diabetes mellitus  - Blood glucose levels showing a downward trend  - Continue current regimen of sliding scale insulin as needed  -Send blood sugars for my review in 1 to 2 weeks.  -Will at that time determine if blood sugars are down, or if additional intervention is required.  - Reordered labs: A1c, C-peptide, cholesterol, and urine test to assess renal function  - Potential adjustments to treatment plan if levels remain elevated, including insulin coverage for tube feeding    2. Hypercalcemia  - Etiology of elevated calcium levels remains uncertain  -

## 2025-04-21 NOTE — PROGRESS NOTES
[facility] [place] [facility]  [facility] ENDOCRINOLOGY  [address]  [address]  [place] [address]  Dept: [phone]  Loc: [phone]     Visit Date: 4/28/2025    The patient (or guardian, if applicable) and other individuals in attendance with the patient were advised that Artificial Intelligence will be utilized during this visit to record, process the conversation to generate a clinical note, and support improvement of the AI technology. The patient (or guardian, if applicable) and other individuals in attendance at the appointment consented to the use of AI, including the recording.      Radha Yousif is a 44 y.o. female who presents today for:Follow up  Chief Complaint   Patient presents with    Follow-up     T2DM      PCP: No primary care provider on file.    History of Present Illness  The patient presents for evaluation of elevated calcium level in the setting of a low parathyroid hormone. She is accompanied by her mother.    Her last calcium level in 04/2025 was normal at 10.2, and her parathyroid hormone was also low, total protein was elevated however SPEP was normal vitamin D stable at 54, calcitriol level was elevated this supplement was stopped level has not been rechecked.     She has a history of diabetes..  Was previously managed with Tresiba but was stopped secondary to hypoglycemic event. she is not on any other diabetes medications due to hypoglycemia. Her current medication regimen includes Fiasp 3-50 sliding scale starting at 150. Her diet consists of nighttime feeds from 9 PM to 5 AM, with minimal food intake during the day. Her last A1c in 04/2025 was 7.5. No issues with foot sores are reported.    She has a history of glaucoma. She is not taking atorvastatin anymore because she is receiving Repatha.          Past Medical History:   Diagnosis Date    Depression     OCD (obsessive compulsive disorder)     Type II or unspecified type diabetes mellitus without

## 2025-04-28 ENCOUNTER — OFFICE VISIT (OUTPATIENT)
Age: 45
End: 2025-04-28
Payer: COMMERCIAL

## 2025-04-28 VITALS
DIASTOLIC BLOOD PRESSURE: 74 MMHG | SYSTOLIC BLOOD PRESSURE: 102 MMHG | HEART RATE: 76 BPM | BODY MASS INDEX: 22.85 KG/M2 | HEIGHT: 60 IN

## 2025-04-28 DIAGNOSIS — E11.65 TYPE 2 DIABETES MELLITUS WITH HYPERGLYCEMIA, WITHOUT LONG-TERM CURRENT USE OF INSULIN (HCC): ICD-10-CM

## 2025-04-28 DIAGNOSIS — E83.52 HYPERCALCEMIA: Primary | ICD-10-CM

## 2025-04-28 PROCEDURE — 99214 OFFICE O/P EST MOD 30 MIN: CPT

## 2025-04-28 RX ORDER — AMOXICILLIN 875 MG/1
875 TABLET, COATED ORAL 2 TIMES DAILY
COMMUNITY

## 2025-04-28 RX ORDER — MIRTAZAPINE 15 MG/1
15 TABLET, FILM COATED ORAL NIGHTLY
COMMUNITY

## 2025-04-28 RX ORDER — CYCLOBENZAPRINE HCL 5 MG
TABLET ORAL
COMMUNITY
Start: 2025-04-24

## 2025-04-28 RX ORDER — INSULIN ASPART INJECTION 100 [IU]/ML
INJECTION, SOLUTION SUBCUTANEOUS
Qty: 10 ML | Refills: 3 | Status: SHIPPED | OUTPATIENT
Start: 2025-04-28

## 2025-04-28 RX ORDER — CHLORHEXIDINE GLUCONATE ORAL RINSE 1.2 MG/ML
15 SOLUTION DENTAL 2 TIMES DAILY
COMMUNITY

## 2025-04-28 RX ORDER — NYSTATIN 100000 [USP'U]/G
POWDER TOPICAL 4 TIMES DAILY
COMMUNITY

## 2025-04-28 NOTE — PATIENT INSTRUCTIONS
ACTH Stim test, parathyroid hormone related peptide and thyroid labs, asap. A1c, lipid panel and microalbumin in about 3 months prior to her next follow up appointment

## 2025-04-29 DIAGNOSIS — E83.52 HYPERCALCEMIA: Primary | ICD-10-CM

## 2025-04-29 RX ORDER — COSYNTROPIN 0.25 MG/ML
250 INJECTION, POWDER, FOR SOLUTION INTRAMUSCULAR; INTRAVENOUS ONCE
OUTPATIENT
Start: 2025-05-07 | End: 2025-05-07

## 2025-05-02 ENCOUNTER — OFFICE VISIT (OUTPATIENT)
Dept: NEPHROLOGY | Age: 45
End: 2025-05-02
Payer: COMMERCIAL

## 2025-05-02 VITALS
HEIGHT: 60 IN | OXYGEN SATURATION: 95 % | DIASTOLIC BLOOD PRESSURE: 78 MMHG | SYSTOLIC BLOOD PRESSURE: 112 MMHG | BODY MASS INDEX: 22.85 KG/M2 | HEART RATE: 88 BPM

## 2025-05-02 DIAGNOSIS — E83.52 HYPERCALCEMIA: ICD-10-CM

## 2025-05-02 DIAGNOSIS — N18.2 CKD (CHRONIC KIDNEY DISEASE), STAGE II: Primary | ICD-10-CM

## 2025-05-02 PROCEDURE — 99204 OFFICE O/P NEW MOD 45 MIN: CPT | Performed by: INTERNAL MEDICINE

## 2025-05-02 NOTE — PROGRESS NOTES
[facility] [place] [facility]  [facility]  [address] [place] [address]  Dept: [phone]  Loc: [phone]  Outpatient Consult  [phone]  5/2/2025 11:55 AM EDT        Pt Name:    Radha Yousif  YOB: 1980  Primary Care Physician:  Miriam Coppola MD     Chief Complaint:   Chief Complaint   Patient presents with    New Patient     Referral from Miya Blank NP for ckd iii        Background Information/Interval History:   The patient is a 44 y.o. year old  female referred by PCP for elevated creatinine.   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 Meningioma, dysphagia with PEG tube, hypotension on midodrine, and as below.   She resides at [facility].     She is aphasic. Accompanied by her mother.     She had labs in March which showed creatinine elevation to 1.5 along with a Calcium of 13.6.   Subsequent labs in April showed creatinine improved to 1.0, urine MAC was 50 mg/g. Calcium was 102.  PTH has been low. She has been seeing endocrinology for this, PTH-rp and 1,25 Vitamin D are pending.    She takes occasional ibuprofen.   She denies taking calcium supplements or antacids when her calcium was high.   Her mom states she got a steroid for a week but otherwise is unsure of any other treatment she received for the calcium being high (ie no record of a bisphosphonate given).  She does see urology for hx of neurogenic bladder. Does not sound like she needs straight cathed regularly.      Allergies:  Dust mite extract, Molds & smuts, and Ranitidine hcl        Past Medical History:  Past Medical History:   Diagnosis Date    Depression     OCD (obsessive compulsive disorder)     Type II or unspecified type diabetes mellitus without mention of complication, not stated as uncontrolled         Past Surgical History:  Past Surgical History:   Procedure Laterality Date

## 2025-05-06 DIAGNOSIS — E83.52 HYPERCALCEMIA: ICD-10-CM

## 2025-05-06 DIAGNOSIS — N18.2 CKD (CHRONIC KIDNEY DISEASE), STAGE II: ICD-10-CM

## 2025-05-07 ENCOUNTER — RESULTS FOLLOW-UP (OUTPATIENT)
Dept: NEPHROLOGY | Age: 45
End: 2025-05-07

## 2025-05-20 ENCOUNTER — HOSPITAL ENCOUNTER (OUTPATIENT)
Dept: NURSING | Age: 45
Discharge: HOME | End: 2025-05-20
Payer: COMMERCIAL

## 2025-05-20 VITALS
SYSTOLIC BLOOD PRESSURE: 135 MMHG | DIASTOLIC BLOOD PRESSURE: 95 MMHG | OXYGEN SATURATION: 96 % | TEMPERATURE: 97.8 F | RESPIRATION RATE: 16 BRPM | HEART RATE: 92 BPM

## 2025-05-20 DIAGNOSIS — E83.52 HYPERCALCEMIA: Primary | ICD-10-CM

## 2025-05-20 LAB
CORTIS SERPL-MCNC: 1 UG/DL
CORTIS SERPL-MCNC: 8.1 UG/DL
CORTIS SERPL-MCNC: 9.7 UG/DL
CORTISOL COLLECTION INFO: NORMAL

## 2025-05-20 PROCEDURE — 82024 ASSAY OF ACTH: CPT

## 2025-05-20 PROCEDURE — 99213 OFFICE O/P EST LOW 20 MIN: CPT

## 2025-05-20 PROCEDURE — 82533 TOTAL CORTISOL: CPT

## 2025-05-20 PROCEDURE — 96374 THER/PROPH/DIAG INJ IV PUSH: CPT

## 2025-05-20 PROCEDURE — 6360000002 HC RX W HCPCS

## 2025-05-20 PROCEDURE — 80400 ACTH STIMULATION PANEL: CPT

## 2025-05-20 RX ORDER — COSYNTROPIN 0.25 MG/ML
250 INJECTION, POWDER, FOR SOLUTION INTRAMUSCULAR; INTRAVENOUS ONCE
Status: CANCELLED | OUTPATIENT
Start: 2025-05-20 | End: 2025-05-20

## 2025-05-20 RX ORDER — COSYNTROPIN 0.25 MG/ML
250 INJECTION, POWDER, FOR SOLUTION INTRAMUSCULAR; INTRAVENOUS ONCE
Status: COMPLETED | OUTPATIENT
Start: 2025-05-20 | End: 2025-05-20

## 2025-05-20 RX ADMIN — COSYNTROPIN 250 MCG: 0.25 INJECTION, POWDER, LYOPHILIZED, FOR SOLUTION INTRAMUSCULAR; INTRAVENOUS at 08:02

## 2025-05-20 NOTE — PROGRESS NOTES
0730  Radha was wheeled into room via wheelchair with mother Sammy for ACTH cortrosyn stimulation test. Pt rights and responsibilities offered to her and Sammy.   0758 baseline labs drawn and sent to lab as ordered.  0802 cortrosyn given   Sammy at bedside and call light within reach.    0830    30 mins post drawn labs    0900    60 mins post drawn labs    0915   D/c instructions discussed and Sammy verbalized understanding. Sammy said she will call transport to have them pick Radha up. Sammy wheeled Radha out for d/c today.         __m__ Safety:       (Environmental)  [place] to environment  Ensure ID band is correct and in place/ allergy band as needed  Assess for fall risk  Initiate fall precautions as applicable (fall band, side rails, etc.)  Call light within reach  Bed in low position/ wheels locked    _m___ Pain:       Assess pain level and characteristics  Administer analgesics as ordered  Assess effectiveness of pain management and report to MD as needed    __m__ Knowledge Deficit:  Assess baseline knowledge  Provide teaching at level of understanding  Provide teaching via preferred learning method  Evaluate teaching effectiveness    __m__ Hemodynamic/Respiratory Status:       (Pre and Post Procedure Monitoring)  Assess/Monitor vital signs and LOC  Assess Baseline SpO2 prior to any sedation  Obtain weight/height  Assess vital signs/ LOC until patient meets discharge criteria  Monitor procedure site and notify MD of any issues

## 2025-05-21 LAB — ACTH PLAS-MCNC: 11.6 PG/ML (ref 7.2–63.3)

## 2025-05-22 ENCOUNTER — RESULTS FOLLOW-UP (OUTPATIENT)
Age: 45
End: 2025-05-22

## 2025-05-22 DIAGNOSIS — E27.40 ADRENAL INSUFFICIENCY: Primary | ICD-10-CM

## 2025-05-22 NOTE — RESULT ENCOUNTER NOTE
Please contact patient and document response,Lab tests were abnormal  I was unable to reach patient or mother by phone. Get patient scheduled back to see me next week, preferably earlier in the week Monday or Tuesday.

## 2025-05-23 NOTE — TELEPHONE ENCOUNTER
----- Message from ERROL Bland CNP sent at 5/22/2025  2:30 PM EDT -----  Please contact patient and document response,Lab tests were abnormal  I was unable to reach patient or mother by phone. Get patient scheduled back to see me next week, preferably earlier in the week Monday or Tuesday.

## 2025-06-11 PROBLEM — N17.9 ARF (ACUTE RENAL FAILURE): Status: ACTIVE | Noted: 2025-06-11

## 2025-06-12 ENCOUNTER — HOSPITAL ENCOUNTER (INPATIENT)
Age: 45
LOS: 18 days | Discharge: HOME | DRG: 987 | End: 2025-06-30
Attending: INTERNAL MEDICINE | Admitting: INTERNAL MEDICINE
Payer: COMMERCIAL

## 2025-06-12 ENCOUNTER — APPOINTMENT (OUTPATIENT)
Dept: INTERVENTIONAL RADIOLOGY/VASCULAR | Age: 45
DRG: 987 | End: 2025-06-12
Attending: INTERNAL MEDICINE
Payer: COMMERCIAL

## 2025-06-12 ENCOUNTER — APPOINTMENT (OUTPATIENT)
Dept: CT IMAGING | Age: 45
DRG: 987 | End: 2025-06-12
Attending: INTERNAL MEDICINE
Payer: COMMERCIAL

## 2025-06-12 DIAGNOSIS — E83.52 HYPERCALCEMIA: ICD-10-CM

## 2025-06-12 DIAGNOSIS — E27.40 ADRENAL INSUFFICIENCY: ICD-10-CM

## 2025-06-12 DIAGNOSIS — D86.9 SARCOIDOSIS: ICD-10-CM

## 2025-06-12 DIAGNOSIS — E23.2 DIABETES INSIPIDUS: ICD-10-CM

## 2025-06-12 DIAGNOSIS — R00.1 BRADYCARDIA: ICD-10-CM

## 2025-06-12 DIAGNOSIS — R94.6 ABNORMAL THYROID FUNCTION TEST: ICD-10-CM

## 2025-06-12 DIAGNOSIS — I95.9 HYPOTENSION, UNSPECIFIED HYPOTENSION TYPE: Primary | ICD-10-CM

## 2025-06-12 DIAGNOSIS — E23.2 PRIMARY CENTRAL DIABETES INSIPIDUS: ICD-10-CM

## 2025-06-12 PROBLEM — E87.0 HYPERNATREMIA: Status: ACTIVE | Noted: 2025-06-12

## 2025-06-12 LAB
25(OH)D3 SERPL-MCNC: 38 NG/ML (ref 30–100)
ALBUMIN SERPL BCG-MCNC: 3.7 G/DL (ref 3.4–4.9)
ALP SERPL-CCNC: 144 U/L (ref 38–126)
ALT SERPL W/O P-5'-P-CCNC: 16 U/L (ref 10–35)
ANION GAP SERPL CALC-SCNC: 10 MEQ/L (ref 8–16)
ANION GAP SERPL CALC-SCNC: 12 MEQ/L (ref 8–16)
ANION GAP SERPL CALC-SCNC: 13 MEQ/L (ref 8–16)
AST SERPL-CCNC: 39 U/L (ref 10–35)
BACTERIA: ABNORMAL
BASOPHILS ABSOLUTE: 0.1 THOU/MM3 (ref 0–0.1)
BASOPHILS NFR BLD AUTO: 1 %
BILIRUB SERPL-MCNC: 0.4 MG/DL (ref 0.3–1.2)
BILIRUB UR QL STRIP: NEGATIVE
BUN SERPL-MCNC: 18 MG/DL (ref 8–23)
BUN SERPL-MCNC: 54 MG/DL (ref 8–23)
BUN SERPL-MCNC: 57 MG/DL (ref 8–23)
CA-I BLD ISE-SCNC: 1.3 MMOL/L (ref 1.12–1.32)
CA-I BLD ISE-SCNC: 1.85 MMOL/L (ref 1.12–1.32)
CALCIUM SERPL-MCNC: 14.9 MG/DL (ref 8.6–10)
CALCIUM SERPL-MCNC: 15.3 MG/DL (ref 8.6–10)
CALCIUM SERPL-MCNC: 9.9 MG/DL (ref 8.6–10)
CASTS #/AREA URNS LPF: ABNORMAL /LPF
CASTS #/AREA URNS LPF: ABNORMAL /LPF
CHARACTER UR: CLEAR
CHARCOAL URNS QL MICRO: ABNORMAL
CHLORIDE 24H UR-SRATE: 64 MEQ/L
CHLORIDE SERPL-SCNC: 113 MEQ/L (ref 98–111)
CHLORIDE SERPL-SCNC: 122 MEQ/L (ref 98–111)
CHLORIDE SERPL-SCNC: 123 MEQ/L (ref 98–111)
CO2 SERPL-SCNC: 23 MEQ/L (ref 22–29)
CO2 SERPL-SCNC: 25 MEQ/L (ref 22–29)
CO2 SERPL-SCNC: 26 MEQ/L (ref 22–29)
COLOR UR: YELLOW
CREAT SERPL-MCNC: 1.8 MG/DL (ref 0.5–0.9)
CREAT SERPL-MCNC: 4 MG/DL (ref 0.5–0.9)
CREAT SERPL-MCNC: 4.1 MG/DL (ref 0.5–0.9)
CRYSTALS URNS QL MICRO: ABNORMAL
DEPRECATED RDW RBC AUTO: 49.4 FL (ref 35–45)
EOSINOPHIL NFR BLD AUTO: 8.7 %
EOSINOPHILS ABSOLUTE: 0.4 THOU/MM3 (ref 0–0.4)
EPITHELIAL CELLS, UA: ABNORMAL /HPF
ERYTHROCYTE [DISTWIDTH] IN BLOOD BY AUTOMATED COUNT: 14.2 % (ref 11.5–14.5)
FREE KAPPA/LAMBDA RATIO: 0.91 (ref 0.22–1.74)
GFR SERPL CREATININE-BSD FRML MDRD: 13 ML/MIN/1.73M2
GFR SERPL CREATININE-BSD FRML MDRD: 13 ML/MIN/1.73M2
GFR SERPL CREATININE-BSD FRML MDRD: 35 ML/MIN/1.73M2
GLUCOSE BLD STRIP.AUTO-MCNC: 107 MG/DL (ref 70–108)
GLUCOSE BLD STRIP.AUTO-MCNC: 132 MG/DL (ref 70–108)
GLUCOSE BLD STRIP.AUTO-MCNC: 83 MG/DL (ref 70–108)
GLUCOSE SERPL-MCNC: 125 MG/DL (ref 74–109)
GLUCOSE SERPL-MCNC: 89 MG/DL (ref 74–109)
GLUCOSE SERPL-MCNC: 96 MG/DL (ref 74–109)
GLUCOSE UR QL STRIP.AUTO: NEGATIVE MG/DL
HBV CORE IGM SERPL QL IA: NONREACTIVE
HBV SURFACE AB TITR SER: > 1000 MIU/ML
HBV SURFACE AG SERPL QL IA: NONREACTIVE
HCT VFR BLD AUTO: 42.5 % (ref 37–47)
HGB BLD-MCNC: 12.8 GM/DL (ref 12–16)
HGB UR QL STRIP.AUTO: ABNORMAL
IMM GRANULOCYTES # BLD AUTO: 0.01 THOU/MM3 (ref 0–0.07)
IMM GRANULOCYTES NFR BLD AUTO: 0.2 %
KAPPA LC FREE SER-MCNC: 163 MG/L
KETONES UR QL STRIP.AUTO: NEGATIVE
LACTATE SERPL-SCNC: 1.5 MMOL/L (ref 0.5–2.2)
LAMBDA LC FREE SERPL-MCNC: 179 MG/L (ref 4.2–27.7)
LEUKOCYTE ESTERASE UR QL STRIP.AUTO: ABNORMAL
LYMPHOCYTES ABSOLUTE: 1.4 THOU/MM3 (ref 1–4.8)
LYMPHOCYTES NFR BLD AUTO: 27.4 %
MCH RBC QN AUTO: 28.5 PG (ref 26–33)
MCHC RBC AUTO-ENTMCNC: 30.1 GM/DL (ref 32.2–35.5)
MCV RBC AUTO: 94.7 FL (ref 81–99)
MONOCYTES ABSOLUTE: 0.5 THOU/MM3 (ref 0.4–1.3)
MONOCYTES NFR BLD AUTO: 9.5 %
NEUTROPHILS ABSOLUTE: 2.7 THOU/MM3 (ref 1.8–7.7)
NEUTROPHILS NFR BLD AUTO: 53.2 %
NITRITE UR QL STRIP.AUTO: NEGATIVE
NRBC BLD AUTO-RTO: 0 /100 WBC
PH UR STRIP.AUTO: >= 9 [PH] (ref 5–9)
PLATELET # BLD AUTO: 208 THOU/MM3 (ref 130–400)
PMV BLD AUTO: 10.4 FL (ref 9.4–12.4)
POTASSIUM SERPL-SCNC: 3.3 MEQ/L (ref 3.5–5.2)
POTASSIUM SERPL-SCNC: 4 MEQ/L (ref 3.5–5.2)
POTASSIUM SERPL-SCNC: 4.4 MEQ/L (ref 3.5–5.2)
POTASSIUM UR-SCNC: 12.7 MEQ/L
PROT SERPL-MCNC: 7.8 G/DL (ref 6.4–8.3)
PROT UR STRIP.AUTO-MCNC: 30 MG/DL
PTH-INTACT SERPL-MCNC: 17 PG/ML (ref 15–65)
RBC # BLD AUTO: 4.49 MILL/MM3 (ref 4.2–5.4)
RBC #/AREA URNS HPF: ABNORMAL /HPF
RENAL EPI CELLS #/AREA URNS HPF: ABNORMAL /[HPF]
SODIUM SERPL-SCNC: 148 MEQ/L (ref 135–145)
SODIUM SERPL-SCNC: 158 MEQ/L (ref 135–145)
SODIUM SERPL-SCNC: 161 MEQ/L (ref 135–145)
SODIUM UR-SCNC: 99 MEQ/L
SPECIFIC GRAVITY UA: 1 (ref 1–1.03)
UROBILINOGEN, URINE: 0.2 EU/DL (ref 0–1)
WBC # BLD AUTO: 5 THOU/MM3 (ref 4.8–10.8)
WBC #/AREA URNS HPF: ABNORMAL /HPF
YEAST LIKE FUNGI URNS QL MICRO: ABNORMAL

## 2025-06-12 PROCEDURE — 6360000002 HC RX W HCPCS: Performed by: INTERNAL MEDICINE

## 2025-06-12 PROCEDURE — C1881 DIALYSIS ACCESS SYSTEM: HCPCS

## 2025-06-12 PROCEDURE — 6370000000 HC RX 637 (ALT 250 FOR IP): Performed by: PHYSICIAN ASSISTANT

## 2025-06-12 PROCEDURE — 81001 URINALYSIS AUTO W/SCOPE: CPT

## 2025-06-12 PROCEDURE — 83970 ASSAY OF PARATHORMONE: CPT

## 2025-06-12 PROCEDURE — 99223 1ST HOSP IP/OBS HIGH 75: CPT | Performed by: PHYSICIAN ASSISTANT

## 2025-06-12 PROCEDURE — 99222 1ST HOSP IP/OBS MODERATE 55: CPT | Performed by: INTERNAL MEDICINE

## 2025-06-12 PROCEDURE — 6360000002 HC RX W HCPCS: Performed by: RADIOLOGY

## 2025-06-12 PROCEDURE — 84300 ASSAY OF URINE SODIUM: CPT

## 2025-06-12 PROCEDURE — 36556 INSERT NON-TUNNEL CV CATH: CPT

## 2025-06-12 PROCEDURE — P9047 ALBUMIN (HUMAN), 25%, 50ML: HCPCS | Performed by: INTERNAL MEDICINE

## 2025-06-12 PROCEDURE — 36415 COLL VENOUS BLD VENIPUNCTURE: CPT

## 2025-06-12 PROCEDURE — 84165 PROTEIN E-PHORESIS SERUM: CPT

## 2025-06-12 PROCEDURE — 51702 INSERT TEMP BLADDER CATH: CPT

## 2025-06-12 PROCEDURE — 80053 COMPREHEN METABOLIC PANEL: CPT

## 2025-06-12 PROCEDURE — 6360000002 HC RX W HCPCS: Performed by: PHYSICIAN ASSISTANT

## 2025-06-12 PROCEDURE — 77001 FLUOROGUIDE FOR VEIN DEVICE: CPT

## 2025-06-12 PROCEDURE — 76937 US GUIDE VASCULAR ACCESS: CPT

## 2025-06-12 PROCEDURE — 82436 ASSAY OF URINE CHLORIDE: CPT

## 2025-06-12 PROCEDURE — 84133 ASSAY OF URINE POTASSIUM: CPT

## 2025-06-12 PROCEDURE — 86706 HEP B SURFACE ANTIBODY: CPT

## 2025-06-12 PROCEDURE — 2500000003 HC RX 250 WO HCPCS: Performed by: PHYSICIAN ASSISTANT

## 2025-06-12 PROCEDURE — 83883 ASSAY NEPHELOMETRY NOT SPEC: CPT

## 2025-06-12 PROCEDURE — 82306 VITAMIN D 25 HYDROXY: CPT

## 2025-06-12 PROCEDURE — 84155 ASSAY OF PROTEIN SERUM: CPT

## 2025-06-12 PROCEDURE — 2580000003 HC RX 258: Performed by: INTERNAL MEDICINE

## 2025-06-12 PROCEDURE — 90935 HEMODIALYSIS ONE EVALUATION: CPT

## 2025-06-12 PROCEDURE — 5A1D70Z PERFORMANCE OF URINARY FILTRATION, INTERMITTENT, LESS THAN 6 HOURS PER DAY: ICD-10-PCS | Performed by: INTERNAL MEDICINE

## 2025-06-12 PROCEDURE — 83519 RIA NONANTIBODY: CPT

## 2025-06-12 PROCEDURE — 2060000000 HC ICU INTERMEDIATE R&B

## 2025-06-12 PROCEDURE — 83605 ASSAY OF LACTIC ACID: CPT

## 2025-06-12 PROCEDURE — 82330 ASSAY OF CALCIUM: CPT

## 2025-06-12 PROCEDURE — 85025 COMPLETE CBC W/AUTO DIFF WBC: CPT

## 2025-06-12 PROCEDURE — 86705 HEP B CORE ANTIBODY IGM: CPT

## 2025-06-12 PROCEDURE — 82652 VIT D 1 25-DIHYDROXY: CPT

## 2025-06-12 PROCEDURE — 82948 REAGENT STRIP/BLOOD GLUCOSE: CPT

## 2025-06-12 PROCEDURE — 87340 HEPATITIS B SURFACE AG IA: CPT

## 2025-06-12 RX ORDER — BISACODYL 10 MG
10 SUPPOSITORY, RECTAL RECTAL DAILY PRN
Status: DISCONTINUED | OUTPATIENT
Start: 2025-06-12 | End: 2025-06-30 | Stop reason: HOSPADM

## 2025-06-12 RX ORDER — CYCLOBENZAPRINE HCL 10 MG
5 TABLET ORAL EVERY 8 HOURS PRN
Status: DISCONTINUED | OUTPATIENT
Start: 2025-06-12 | End: 2025-06-30 | Stop reason: HOSPADM

## 2025-06-12 RX ORDER — SODIUM CHLORIDE 0.9 % (FLUSH) 0.9 %
10 SYRINGE (ML) INJECTION PRN
Status: DISCONTINUED | OUTPATIENT
Start: 2025-06-12 | End: 2025-06-23

## 2025-06-12 RX ORDER — POTASSIUM CHLORIDE 7.45 MG/ML
10 INJECTION INTRAVENOUS
Status: DISPENSED | OUTPATIENT
Start: 2025-06-12 | End: 2025-06-12

## 2025-06-12 RX ORDER — SENNA AND DOCUSATE SODIUM 50; 8.6 MG/1; MG/1
1 TABLET, FILM COATED ORAL 2 TIMES DAILY
Status: DISCONTINUED | OUTPATIENT
Start: 2025-06-12 | End: 2025-06-30 | Stop reason: HOSPADM

## 2025-06-12 RX ORDER — ALBUMIN (HUMAN) 12.5 G/50ML
25 SOLUTION INTRAVENOUS
Status: COMPLETED | OUTPATIENT
Start: 2025-06-12 | End: 2025-06-12

## 2025-06-12 RX ORDER — MAGNESIUM SULFATE IN WATER 40 MG/ML
2000 INJECTION, SOLUTION INTRAVENOUS PRN
Status: DISCONTINUED | OUTPATIENT
Start: 2025-06-12 | End: 2025-06-12

## 2025-06-12 RX ORDER — POTASSIUM CHLORIDE 7.45 MG/ML
10 INJECTION INTRAVENOUS PRN
Status: DISCONTINUED | OUTPATIENT
Start: 2025-06-12 | End: 2025-06-12

## 2025-06-12 RX ORDER — CHLORHEXIDINE GLUCONATE ORAL RINSE 1.2 MG/ML
15 SOLUTION DENTAL 2 TIMES DAILY
Status: DISCONTINUED | OUTPATIENT
Start: 2025-06-12 | End: 2025-06-30 | Stop reason: HOSPADM

## 2025-06-12 RX ORDER — SODIUM CHLORIDE 9 MG/ML
INJECTION, SOLUTION INTRAVENOUS PRN
Status: DISCONTINUED | OUTPATIENT
Start: 2025-06-12 | End: 2025-06-30 | Stop reason: HOSPADM

## 2025-06-12 RX ORDER — ONDANSETRON 2 MG/ML
4 INJECTION INTRAMUSCULAR; INTRAVENOUS EVERY 6 HOURS PRN
Status: DISCONTINUED | OUTPATIENT
Start: 2025-06-12 | End: 2025-06-30 | Stop reason: HOSPADM

## 2025-06-12 RX ORDER — SODIUM CHLORIDE 0.9 % (FLUSH) 0.9 %
10 SYRINGE (ML) INJECTION EVERY 12 HOURS SCHEDULED
Status: DISCONTINUED | OUTPATIENT
Start: 2025-06-12 | End: 2025-06-23 | Stop reason: SDUPTHER

## 2025-06-12 RX ORDER — MIDODRINE HYDROCHLORIDE 10 MG/1
10 TABLET ORAL
Status: DISCONTINUED | OUTPATIENT
Start: 2025-06-12 | End: 2025-06-30 | Stop reason: HOSPADM

## 2025-06-12 RX ORDER — POTASSIUM CHLORIDE 1500 MG/1
20 TABLET, EXTENDED RELEASE ORAL ONCE
Status: DISCONTINUED | OUTPATIENT
Start: 2025-06-12 | End: 2025-06-12

## 2025-06-12 RX ORDER — ACETAMINOPHEN 325 MG/1
650 TABLET ORAL EVERY MORNING
Status: ON HOLD | COMMUNITY
End: 2025-06-29 | Stop reason: HOSPADM

## 2025-06-12 RX ORDER — DEXTROSE MONOHYDRATE 100 MG/ML
INJECTION, SOLUTION INTRAVENOUS CONTINUOUS PRN
Status: DISCONTINUED | OUTPATIENT
Start: 2025-06-12 | End: 2025-06-30 | Stop reason: HOSPADM

## 2025-06-12 RX ORDER — ACETAMINOPHEN 325 MG/1
650 TABLET ORAL EVERY 6 HOURS PRN
Status: DISCONTINUED | OUTPATIENT
Start: 2025-06-12 | End: 2025-06-30 | Stop reason: HOSPADM

## 2025-06-12 RX ORDER — FAMOTIDINE 20 MG/1
20 TABLET, FILM COATED ORAL NIGHTLY
Status: DISCONTINUED | OUTPATIENT
Start: 2025-06-12 | End: 2025-06-13

## 2025-06-12 RX ORDER — POTASSIUM CHLORIDE 7.45 MG/ML
10 INJECTION INTRAVENOUS ONCE
Status: COMPLETED | OUTPATIENT
Start: 2025-06-12 | End: 2025-06-12

## 2025-06-12 RX ORDER — INSULIN LISPRO 100 [IU]/ML
0-8 INJECTION, SOLUTION INTRAVENOUS; SUBCUTANEOUS
Status: DISCONTINUED | OUTPATIENT
Start: 2025-06-12 | End: 2025-06-13

## 2025-06-12 RX ORDER — POTASSIUM CHLORIDE 1500 MG/1
40 TABLET, EXTENDED RELEASE ORAL PRN
Status: DISCONTINUED | OUTPATIENT
Start: 2025-06-12 | End: 2025-06-12

## 2025-06-12 RX ORDER — ONDANSETRON 4 MG/1
4 TABLET, ORALLY DISINTEGRATING ORAL EVERY 8 HOURS PRN
Status: DISCONTINUED | OUTPATIENT
Start: 2025-06-12 | End: 2025-06-30 | Stop reason: HOSPADM

## 2025-06-12 RX ORDER — GLUCAGON 1 MG/ML
1 KIT INJECTION PRN
Status: DISCONTINUED | OUTPATIENT
Start: 2025-06-12 | End: 2025-06-30 | Stop reason: HOSPADM

## 2025-06-12 RX ORDER — METOPROLOL TARTRATE 25 MG/1
12.5 TABLET, FILM COATED ORAL 2 TIMES DAILY
Status: DISCONTINUED | OUTPATIENT
Start: 2025-06-12 | End: 2025-06-22

## 2025-06-12 RX ORDER — FAMOTIDINE 20 MG/1
40 TABLET, FILM COATED ORAL DAILY
Status: DISCONTINUED | OUTPATIENT
Start: 2025-06-12 | End: 2025-06-12 | Stop reason: DRUGHIGH

## 2025-06-12 RX ORDER — POLYETHYLENE GLYCOL 3350 17 G/17G
17 POWDER, FOR SOLUTION ORAL DAILY PRN
Status: DISCONTINUED | OUTPATIENT
Start: 2025-06-12 | End: 2025-06-30 | Stop reason: HOSPADM

## 2025-06-12 RX ORDER — HEPARIN SODIUM 5000 [USP'U]/ML
5000 INJECTION, SOLUTION INTRAVENOUS; SUBCUTANEOUS EVERY 8 HOURS SCHEDULED
Status: DISCONTINUED | OUTPATIENT
Start: 2025-06-12 | End: 2025-06-30 | Stop reason: HOSPADM

## 2025-06-12 RX ORDER — ACETAMINOPHEN 650 MG/1
650 SUPPOSITORY RECTAL EVERY 6 HOURS PRN
Status: DISCONTINUED | OUTPATIENT
Start: 2025-06-12 | End: 2025-06-30 | Stop reason: HOSPADM

## 2025-06-12 RX ORDER — SODIUM CHLORIDE 9 MG/ML
INJECTION, SOLUTION INTRAVENOUS CONTINUOUS
Status: DISCONTINUED | OUTPATIENT
Start: 2025-06-12 | End: 2025-06-13

## 2025-06-12 RX ORDER — MIRTAZAPINE 7.5 MG/1
7.5 TABLET, FILM COATED ORAL NIGHTLY
Status: DISCONTINUED | OUTPATIENT
Start: 2025-06-12 | End: 2025-06-30 | Stop reason: HOSPADM

## 2025-06-12 RX ORDER — LIDOCAINE HYDROCHLORIDE 20 MG/ML
INJECTION, SOLUTION INFILTRATION; PERINEURAL PRN
Status: DISCONTINUED | OUTPATIENT
Start: 2025-06-12 | End: 2025-06-16 | Stop reason: HOSPADM

## 2025-06-12 RX ORDER — FERROUS SULFATE 325(65) MG
325 TABLET ORAL 2 TIMES DAILY WITH MEALS
Status: DISCONTINUED | OUTPATIENT
Start: 2025-06-12 | End: 2025-06-30 | Stop reason: HOSPADM

## 2025-06-12 RX ORDER — CALCITONIN SALMON 200 [USP'U]/ML
4 INJECTION, SOLUTION INTRAMUSCULAR; SUBCUTANEOUS EVERY 12 HOURS
Status: COMPLETED | OUTPATIENT
Start: 2025-06-12 | End: 2025-06-12

## 2025-06-12 RX ORDER — ENOXAPARIN SODIUM 100 MG/ML
40 INJECTION SUBCUTANEOUS DAILY
Status: DISCONTINUED | OUTPATIENT
Start: 2025-06-12 | End: 2025-06-12

## 2025-06-12 RX ADMIN — POTASSIUM CHLORIDE 10 MEQ: 7.46 INJECTION, SOLUTION INTRAVENOUS at 14:33

## 2025-06-12 RX ADMIN — METOPROLOL TARTRATE 12.5 MG: 25 TABLET, FILM COATED ORAL at 10:23

## 2025-06-12 RX ADMIN — SENNOSIDES, DOCUSATE SODIUM 1 TABLET: 50; 8.6 TABLET, FILM COATED ORAL at 10:23

## 2025-06-12 RX ADMIN — SODIUM CHLORIDE, PRESERVATIVE FREE 10 ML: 5 INJECTION INTRAVENOUS at 10:24

## 2025-06-12 RX ADMIN — HEPARIN SODIUM 5000 UNITS: 5000 INJECTION INTRAVENOUS; SUBCUTANEOUS at 10:23

## 2025-06-12 RX ADMIN — ALBUMIN (HUMAN) 25 G: 0.25 INJECTION, SOLUTION INTRAVENOUS at 15:48

## 2025-06-12 RX ADMIN — METOPROLOL TARTRATE 12.5 MG: 25 TABLET, FILM COATED ORAL at 23:32

## 2025-06-12 RX ADMIN — MIDODRINE HYDROCHLORIDE 10 MG: 10 TABLET ORAL at 10:23

## 2025-06-12 RX ADMIN — MIRTAZAPINE 7.5 MG: 7.5 TABLET, FILM COATED ORAL at 23:33

## 2025-06-12 RX ADMIN — SODIUM CHLORIDE: 9 INJECTION, SOLUTION INTRAVENOUS at 10:10

## 2025-06-12 RX ADMIN — POTASSIUM CHLORIDE 10 MEQ: 7.46 INJECTION, SOLUTION INTRAVENOUS at 14:25

## 2025-06-12 RX ADMIN — CALCITONIN SALMON 210 UNITS: 200 INJECTION, SOLUTION INTRAMUSCULAR; SUBCUTANEOUS at 22:13

## 2025-06-12 RX ADMIN — FLUOXETINE HYDROCHLORIDE 40 MG: 20 CAPSULE ORAL at 10:23

## 2025-06-12 RX ADMIN — SODIUM CHLORIDE: 9 INJECTION, SOLUTION INTRAVENOUS at 21:15

## 2025-06-12 RX ADMIN — FAMOTIDINE 20 MG: 20 TABLET, FILM COATED ORAL at 22:19

## 2025-06-12 RX ADMIN — FERROUS SULFATE TAB 325 MG (65 MG ELEMENTAL FE) 325 MG: 325 (65 FE) TAB at 10:23

## 2025-06-12 RX ADMIN — LIDOCAINE HYDROCHLORIDE 5 ML: 20 INJECTION, SOLUTION INFILTRATION; PERINEURAL at 13:58

## 2025-06-12 RX ADMIN — SODIUM CHLORIDE: 9 INJECTION, SOLUTION INTRAVENOUS at 15:22

## 2025-06-12 RX ADMIN — CYCLOBENZAPRINE 5 MG: 10 TABLET, FILM COATED ORAL at 22:23

## 2025-06-12 RX ADMIN — 0.12% CHLORHEXIDINE GLUCONATE 15 ML: 1.2 RINSE ORAL at 22:19

## 2025-06-12 RX ADMIN — CALCITONIN SALMON 210 UNITS: 200 INJECTION, SOLUTION INTRAMUSCULAR; SUBCUTANEOUS at 08:23

## 2025-06-12 ASSESSMENT — PAIN DESCRIPTION - FREQUENCY: FREQUENCY: INTERMITTENT

## 2025-06-12 ASSESSMENT — PAIN DESCRIPTION - ORIENTATION: ORIENTATION: LEFT

## 2025-06-12 ASSESSMENT — PAIN DESCRIPTION - DESCRIPTORS: DESCRIPTORS: CRAMPING

## 2025-06-12 ASSESSMENT — PAIN DESCRIPTION - LOCATION: LOCATION: ARM

## 2025-06-12 ASSESSMENT — PAIN DESCRIPTION - ONSET: ONSET: ON-GOING

## 2025-06-12 ASSESSMENT — PAIN SCALES - WONG BAKER: WONGBAKER_NUMERICALRESPONSE: NO HURT

## 2025-06-12 ASSESSMENT — PAIN - FUNCTIONAL ASSESSMENT: PAIN_FUNCTIONAL_ASSESSMENT: PREVENTS OR INTERFERES SOME ACTIVE ACTIVITIES AND ADLS

## 2025-06-12 ASSESSMENT — PAIN DESCRIPTION - PAIN TYPE: TYPE: ACUTE PAIN

## 2025-06-12 NOTE — OP NOTE
Department of Radiology  Post Procedure Progress Note      Pre-Procedure Diagnosis:  Renal Failure    Procedure Performed: Dialysis Catheter insertion     Anesthesia: local     Findings: successful    Immediate Complications:  None    Estimated Blood Loss: minimal    SEE DICTATED PROCEDURE NOTE FOR COMPLETE DETAILS.      Electronically signed by Bradley Jerez MD on 6/12/2025 at 2:01 PM

## 2025-06-12 NOTE — H&P
Hospitalist History & Physical         Patient: Radha Yousif 44 y.o. female      : 1980  Date of Admission: 2025  Date of Service: Pt seen/examined on 25 and Admitted to Inpatient with expected LOS greater than two midnights due to medical therapy.         ASSESSMENT AND PLAN        ADAM on ARF superimposed on CKD   Creatinine 4.1 - 1.5 back in march  Suspect baseline is 1- 1.5   Report indicated decreased output despite fowler   Strict I and O's  Nephrology consult to help manage renal impairment         Hypercalcemia   Coordinating with nephrology   Running IVF + calcitonin   Obtaining vitamin D, PTH, PTHr peptride   Nephrology to dialyze if initial interventions do not improve calcium levels.       Hypernatremia   Unclear etiology   May need free water flushes to help lower but will consult with nephrology and dietary on recommendations here     Type II diabetes   Placing medium Sliding scale  Hypoglycemia protocol     PEG dependent  Dietitian consulted to help manage tube feedings   Meds modified to appropriate route     History of CVA, left sided deficits and aphasia   Noted from review and exam     Data reviewed (unless otherwise discussed in assessment/plan)  EKG:  I have reviewed the EKG with the following interpretation:   Imaging: I have reviewed CT - head some signal for metastatic lesions   Labs: Reviewed, see chart and plan above.       =======================================================================    SUBJECTIVE    Chief Complaint:  fatigue, altered mentation from baseline     History Of Present Illness:  Radha Yousif is a 44 y.o. female who presents to [facility] with fatigue, altered mentation from baseline. Patient is not a reliable historian and no additional historian present at time of assessment. Per reports primarily from mother, patient has been more fatigued since last Thursday and decreased capacity to communicate/express herself. She was

## 2025-06-12 NOTE — H&P
Formulation and discussion of sedation / procedure plans, risks, benefits, side effects and alternatives with patient and/or responsible adult completed.    Electronically signed by Bradley Jerez MD on 6/12/2025 at 1:47 PM

## 2025-06-12 NOTE — CARE COORDINATION
Case Management Assessment Initial Evaluation    Date/Time of Evaluation: 2025 1:56 PM  Assessment Completed by: Melissa Mccurdy RN    If patient is discharged prior to next notation, then this note serves as note for discharge by case management.    Patient Name: Radha Yousif                   YOB: 1980  Diagnosis: Acute renal failure [N17.9]  Liver cancer (HCC) [C22.9]  Hypercalcemia [E83.52]  ARF (acute renal failure) [N17.9]                   Date / Time: 2025  2:41 AM  [facility]     Patient Admission Status: Inpatient   Readmission Risk Low 0-14, Mod 15-19), High > 20: Readmission Risk Score: 15.5    Current PCP: Miriam Coppola MD  Health Care Decision Makers:     Additional Case Management Notes: Direct admit from [facility] as she wasn't acting herself per family. Nephrology consulted. Placement of nontunneled HD cath. Hemodialysis ordered. Resuming TF, Jevity 300 ml with 240ml free water three x day. Creatinine 4.1. IVF.     Procedures: none    Imagin/12 CT Abd/Plv WO (Outside imaging): 1. Moderate splenomegaly. Nonobstructing bilateral renal calculi.   2. Multiple small noncalcified nodules in both lower lung fields, suspicious for   metastatic disease.   3. There is a multinodular infiltrative process in the left lung base which   could represent additional metastatic lesions or an atypical pneumonic   infiltrate. No effusion seen.    CT Head (outside imaging): 1. No acute findings in the brain.   2. Grossly stable meningioma surrounding the left anterior clinoid process and   extending anterior to the left temporal lobe     Patient Goals/Plan/Treatment Preferences: From [facility]

## 2025-06-13 LAB
ANION GAP SERPL CALC-SCNC: 11 MEQ/L (ref 8–16)
ANION GAP SERPL CALC-SCNC: 12 MEQ/L (ref 8–16)
ANION GAP SERPL CALC-SCNC: 12 MEQ/L (ref 8–16)
ANION GAP SERPL CALC-SCNC: 13 MEQ/L (ref 8–16)
BUN SERPL-MCNC: 18 MG/DL (ref 8–23)
BUN SERPL-MCNC: 18 MG/DL (ref 8–23)
BUN SERPL-MCNC: 21 MG/DL (ref 8–23)
BUN SERPL-MCNC: 24 MG/DL (ref 8–23)
CA-I BLD ISE-SCNC: 1.21 MMOL/L (ref 1.12–1.32)
CA-I BLD ISE-SCNC: 1.3 MMOL/L (ref 1.12–1.32)
CA-I BLD ISE-SCNC: 1.36 MMOL/L (ref 1.12–1.32)
CA-I BLD ISE-SCNC: 1.39 MMOL/L (ref 1.12–1.32)
CALCIUM SERPL-MCNC: 10.2 MG/DL (ref 8.6–10)
CALCIUM SERPL-MCNC: 10.2 MG/DL (ref 8.6–10)
CALCIUM SERPL-MCNC: 9.7 MG/DL (ref 8.6–10)
CALCIUM SERPL-MCNC: 9.9 MG/DL (ref 8.6–10)
CHLORIDE SERPL-SCNC: 118 MEQ/L (ref 98–111)
CHLORIDE SERPL-SCNC: 120 MEQ/L (ref 98–111)
CHLORIDE SERPL-SCNC: 120 MEQ/L (ref 98–111)
CHLORIDE SERPL-SCNC: 122 MEQ/L (ref 98–111)
CO2 SERPL-SCNC: 18 MEQ/L (ref 22–29)
CO2 SERPL-SCNC: 19 MEQ/L (ref 22–29)
CO2 SERPL-SCNC: 20 MEQ/L (ref 22–29)
CO2 SERPL-SCNC: 21 MEQ/L (ref 22–29)
CORTIS SERPL-MCNC: 1 UG/DL
CORTISOL COLLECTION INFO: NORMAL
CREAT SERPL-MCNC: 2 MG/DL (ref 0.5–0.9)
CREAT SERPL-MCNC: 2 MG/DL (ref 0.5–0.9)
CREAT SERPL-MCNC: 2.1 MG/DL (ref 0.5–0.9)
CREAT SERPL-MCNC: 2.1 MG/DL (ref 0.5–0.9)
EKG ATRIAL RATE: 85 BPM
EKG P AXIS: 34 DEGREES
EKG P-R INTERVAL: 152 MS
EKG Q-T INTERVAL: 512 MS
EKG QRS DURATION: 74 MS
EKG QTC CALCULATION (BAZETT): 609 MS
EKG R AXIS: 99 DEGREES
EKG T AXIS: 110 DEGREES
EKG VENTRICULAR RATE: 85 BPM
GFR SERPL CREATININE-BSD FRML MDRD: 29 ML/MIN/1.73M2
GFR SERPL CREATININE-BSD FRML MDRD: 29 ML/MIN/1.73M2
GFR SERPL CREATININE-BSD FRML MDRD: 31 ML/MIN/1.73M2
GFR SERPL CREATININE-BSD FRML MDRD: 31 ML/MIN/1.73M2
GLUCOSE BLD STRIP.AUTO-MCNC: 162 MG/DL (ref 70–108)
GLUCOSE BLD STRIP.AUTO-MCNC: 298 MG/DL (ref 70–108)
GLUCOSE BLD STRIP.AUTO-MCNC: 304 MG/DL (ref 70–108)
GLUCOSE BLD STRIP.AUTO-MCNC: 316 MG/DL (ref 70–108)
GLUCOSE BLD STRIP.AUTO-MCNC: 324 MG/DL (ref 70–108)
GLUCOSE BLD STRIP.AUTO-MCNC: 98 MG/DL (ref 70–108)
GLUCOSE SERPL-MCNC: 106 MG/DL (ref 74–109)
GLUCOSE SERPL-MCNC: 301 MG/DL (ref 74–109)
GLUCOSE SERPL-MCNC: 334 MG/DL (ref 74–109)
GLUCOSE SERPL-MCNC: 92 MG/DL (ref 74–109)
MAGNESIUM SERPL-MCNC: 1.9 MG/DL (ref 1.6–2.6)
PHOSPHATE SERPL-MCNC: 2 MG/DL (ref 2.5–4.5)
POTASSIUM SERPL-SCNC: 3.1 MEQ/L (ref 3.5–5.2)
POTASSIUM SERPL-SCNC: 3.4 MEQ/L (ref 3.5–5.2)
POTASSIUM SERPL-SCNC: 3.4 MEQ/L (ref 3.5–5.2)
POTASSIUM SERPL-SCNC: 3.8 MEQ/L (ref 3.5–5.2)
SODIUM SERPL-SCNC: 150 MEQ/L (ref 135–145)
SODIUM SERPL-SCNC: 151 MEQ/L (ref 135–145)
SODIUM SERPL-SCNC: 151 MEQ/L (ref 135–145)
SODIUM SERPL-SCNC: 154 MEQ/L (ref 135–145)

## 2025-06-13 PROCEDURE — 6370000000 HC RX 637 (ALT 250 FOR IP)

## 2025-06-13 PROCEDURE — 6370000000 HC RX 637 (ALT 250 FOR IP): Performed by: INTERNAL MEDICINE

## 2025-06-13 PROCEDURE — 2500000003 HC RX 250 WO HCPCS: Performed by: PHYSICIAN ASSISTANT

## 2025-06-13 PROCEDURE — 93010 ELECTROCARDIOGRAM REPORT: CPT | Performed by: INTERNAL MEDICINE

## 2025-06-13 PROCEDURE — 82533 TOTAL CORTISOL: CPT

## 2025-06-13 PROCEDURE — 2580000003 HC RX 258: Performed by: INTERNAL MEDICINE

## 2025-06-13 PROCEDURE — 6360000002 HC RX W HCPCS

## 2025-06-13 PROCEDURE — 83735 ASSAY OF MAGNESIUM: CPT

## 2025-06-13 PROCEDURE — 1200000003 HC TELEMETRY R&B

## 2025-06-13 PROCEDURE — 99232 SBSQ HOSP IP/OBS MODERATE 35: CPT | Performed by: INTERNAL MEDICINE

## 2025-06-13 PROCEDURE — 6370000000 HC RX 637 (ALT 250 FOR IP): Performed by: PHYSICIAN ASSISTANT

## 2025-06-13 PROCEDURE — 36415 COLL VENOUS BLD VENIPUNCTURE: CPT

## 2025-06-13 PROCEDURE — 82948 REAGENT STRIP/BLOOD GLUCOSE: CPT

## 2025-06-13 PROCEDURE — 99233 SBSQ HOSP IP/OBS HIGH 50: CPT | Performed by: INTERNAL MEDICINE

## 2025-06-13 PROCEDURE — 6360000002 HC RX W HCPCS: Performed by: PHYSICIAN ASSISTANT

## 2025-06-13 PROCEDURE — 51702 INSERT TEMP BLADDER CATH: CPT

## 2025-06-13 PROCEDURE — 80048 BASIC METABOLIC PNL TOTAL CA: CPT

## 2025-06-13 PROCEDURE — 82330 ASSAY OF CALCIUM: CPT

## 2025-06-13 PROCEDURE — 84100 ASSAY OF PHOSPHORUS: CPT

## 2025-06-13 PROCEDURE — 93005 ELECTROCARDIOGRAM TRACING: CPT

## 2025-06-13 RX ORDER — INSULIN LISPRO 100 [IU]/ML
0-8 INJECTION, SOLUTION INTRAVENOUS; SUBCUTANEOUS EVERY 6 HOURS
Status: DISCONTINUED | OUTPATIENT
Start: 2025-06-13 | End: 2025-06-14

## 2025-06-13 RX ORDER — PANTOPRAZOLE SODIUM 40 MG/10ML
40 INJECTION, POWDER, LYOPHILIZED, FOR SOLUTION INTRAVENOUS DAILY
Status: DISCONTINUED | OUTPATIENT
Start: 2025-06-13 | End: 2025-06-14

## 2025-06-13 RX ORDER — HYDROCORTISONE SODIUM SUCCINATE 100 MG/2ML
100 INJECTION INTRAMUSCULAR; INTRAVENOUS EVERY 8 HOURS
Status: DISCONTINUED | OUTPATIENT
Start: 2025-06-13 | End: 2025-06-14

## 2025-06-13 RX ORDER — SODIUM CHLORIDE 450 MG/100ML
INJECTION, SOLUTION INTRAVENOUS CONTINUOUS
Status: DISCONTINUED | OUTPATIENT
Start: 2025-06-13 | End: 2025-06-14

## 2025-06-13 RX ORDER — INSULIN GLARGINE 100 [IU]/ML
8 INJECTION, SOLUTION SUBCUTANEOUS NIGHTLY
Status: DISCONTINUED | OUTPATIENT
Start: 2025-06-13 | End: 2025-06-14

## 2025-06-13 RX ADMIN — 0.12% CHLORHEXIDINE GLUCONATE 15 ML: 1.2 RINSE ORAL at 09:34

## 2025-06-13 RX ADMIN — MIDODRINE HYDROCHLORIDE 10 MG: 10 TABLET ORAL at 13:11

## 2025-06-13 RX ADMIN — POTASSIUM BICARBONATE 20 MEQ: 782 TABLET, EFFERVESCENT ORAL at 09:31

## 2025-06-13 RX ADMIN — MIDODRINE HYDROCHLORIDE 10 MG: 10 TABLET ORAL at 09:31

## 2025-06-13 RX ADMIN — ONDANSETRON 4 MG: 2 INJECTION, SOLUTION INTRAMUSCULAR; INTRAVENOUS at 04:52

## 2025-06-13 RX ADMIN — SODIUM CHLORIDE: 4.5 INJECTION, SOLUTION INTRAVENOUS at 09:41

## 2025-06-13 RX ADMIN — MIRTAZAPINE 7.5 MG: 7.5 TABLET, FILM COATED ORAL at 21:07

## 2025-06-13 RX ADMIN — SODIUM CHLORIDE, PRESERVATIVE FREE 10 ML: 5 INJECTION INTRAVENOUS at 21:07

## 2025-06-13 RX ADMIN — INSULIN GLARGINE 8 UNITS: 100 INJECTION, SOLUTION SUBCUTANEOUS at 21:07

## 2025-06-13 RX ADMIN — HYDROCORTISONE SODIUM SUCCINATE 100 MG: 100 INJECTION, POWDER, FOR SOLUTION INTRAMUSCULAR; INTRAVENOUS at 21:07

## 2025-06-13 RX ADMIN — HEPARIN SODIUM 5000 UNITS: 5000 INJECTION INTRAVENOUS; SUBCUTANEOUS at 17:10

## 2025-06-13 RX ADMIN — HEPARIN SODIUM 5000 UNITS: 5000 INJECTION INTRAVENOUS; SUBCUTANEOUS at 01:23

## 2025-06-13 RX ADMIN — SODIUM CHLORIDE, PRESERVATIVE FREE 10 ML: 5 INJECTION INTRAVENOUS at 09:31

## 2025-06-13 RX ADMIN — HEPARIN SODIUM 5000 UNITS: 5000 INJECTION INTRAVENOUS; SUBCUTANEOUS at 09:42

## 2025-06-13 RX ADMIN — INSULIN LISPRO 4 UNITS: 100 INJECTION, SOLUTION INTRAVENOUS; SUBCUTANEOUS at 17:09

## 2025-06-13 RX ADMIN — HYDROCORTISONE SODIUM SUCCINATE 100 MG: 100 INJECTION, POWDER, FOR SOLUTION INTRAMUSCULAR; INTRAVENOUS at 11:08

## 2025-06-13 RX ADMIN — SODIUM CHLORIDE: 4.5 INJECTION, SOLUTION INTRAVENOUS at 20:28

## 2025-06-13 RX ADMIN — MIDODRINE HYDROCHLORIDE 10 MG: 10 TABLET ORAL at 17:10

## 2025-06-13 RX ADMIN — SODIUM CHLORIDE: 9 INJECTION, SOLUTION INTRAVENOUS at 02:52

## 2025-06-13 RX ADMIN — FLUOXETINE HYDROCHLORIDE 40 MG: 20 CAPSULE ORAL at 09:30

## 2025-06-13 RX ADMIN — ACETAMINOPHEN 650 MG: 650 SUPPOSITORY RECTAL at 09:30

## 2025-06-13 RX ADMIN — SENNOSIDES, DOCUSATE SODIUM 1 TABLET: 50; 8.6 TABLET, FILM COATED ORAL at 21:08

## 2025-06-13 RX ADMIN — 0.12% CHLORHEXIDINE GLUCONATE 15 ML: 1.2 RINSE ORAL at 21:07

## 2025-06-13 RX ADMIN — SENNOSIDES, DOCUSATE SODIUM 1 TABLET: 50; 8.6 TABLET, FILM COATED ORAL at 09:31

## 2025-06-13 RX ADMIN — PANTOPRAZOLE SODIUM 40 MG: 40 INJECTION, POWDER, FOR SOLUTION INTRAVENOUS at 21:08

## 2025-06-13 ASSESSMENT — PAIN SCALES - WONG BAKER
WONGBAKER_NUMERICALRESPONSE: NO HURT
WONGBAKER_NUMERICALRESPONSE: NO HURT

## 2025-06-13 NOTE — DISCHARGE INSTR - COC
Continuity of Care Form    Patient Name: Radha Yousif   :  1980  MRN:  [id number]    Admit date:  2025  Discharge date:  2025    Code Status Order: Full Code   Advance Directives:     Admitting Physician:  Kelli Nicholson PA-C  PCP: Miriam Coppola MD    Discharging Nurse: Keeley TARIQ  Discharging Hospital Unit/Room#: 3B38  Discharging Unit Phone Number: [phone]    Emergency Contact:   Extended Emergency Contact Information  Primary Emergency Contact: Sammy Fitzgerald   [facility]  Home Phone: [phone]  Relation: Parent  Secondary Emergency Contact: Jses West  Home Phone: [phone]  Relation: Other    Past Surgical History:  Past Surgical History:   Procedure Laterality Date    WRIST SURGERY         Immunization History:   Immunization History   Administered Date(s) Administered    Pneumococcal, PPSV23, PNEUMOVAX 23, (age 2y+), SC/IM, 0.5mL 2015    TDaP, ADACEL (age 10y-64y), BOOSTRIX (age 10y+), IM, 0.5mL 2010       Active Problems:  Patient Active Problem List   Diagnosis Code    OCD (obsessive compulsive disorder) F42.9    Depression F32.A    Tired R53.83    Hypercalcemia E83.52    ADAM (acute kidney injury) N17.9    Hypernatremia E87.0       Isolation/Infection:   Isolation            No Isolation          Patient Infection Status    None to display         Nurse Assessment:  Last Vital Signs: /82   Pulse 78   Temp 98.2 °F (36.8 °C) (Oral)   Resp 16   Ht 1.524 m (5')   Wt 54.4 kg (119 lb 14.9 oz)   SpO2 98%   BMI 23.42 kg/m²     Last documented pain score (0-10 scale):    Last Weight:   Wt Readings from Last 1 Encounters:   25 54.4 kg (119 lb 14.9 oz)     Mental Status:  thought processes intact, non-verbal    IV Access:  - None    Nursing Mobility/ADLs:  Walking   Dependent  Transfer  Dependent  Bathing  Dependent  Dressing  Dependent  Toileting  Dependent  Feeding  Independent  Med Admin  Dependent  Med Delivery   crushed and

## 2025-06-13 NOTE — CARE COORDINATION
6/13/25, 12:13 PM EDT  Discharge Planning Evaluation  Social work consult received, patient from [facility].    Patient/Family preference is to return to [facility].    The patient's current payor source at the facility is Medicaid.   Medicare skilled days available: unknown  Medicare does the patient have a three midnight qualifying stay? Not currently  Insurance precert:  yes if needing skilled care  Left a message with Cesia at the facility.  Patient bed hold: yes  Anticipated transport plan: ambulance  Patient's Healthcare Decision Maker: Legal Next of Kin    NEIDA met with pt and mother at bedside. Pt did not wake for SW. Mother reports pt has been in the nursing facility for 2 years. They use a erick lift and manual wheelchair. Pt does have electric wheelchair, however unable use it at this time. Mother reports pt has support from herself and a cousin.     Call Cesia with [facility], left a voicemail for a call back.        06/13/25 1214   Service Assessment   Patient Orientation Unable to Assess  (pt sleeping, does not wake, mother answers SW questions)   Cognition Other (see comment)  (pt sleeping, does not wake to  visit)   History Provided By Child/Family   Primary Caregiver Other (Comment)  ([facility])   Support Systems Family Members   Patient's Healthcare Decision Maker is: Legal Next of Kin   PCP Verified by CM Yes   Last Visit to PCP Within last 3 months   Prior Functional Level Assistance with the following:;Bathing;Dressing;Toileting;Cooking;Housework;Shopping;Mobility;Feeding   Current Functional Level Assistance with the following:;Bathing;Dressing;Toileting;Feeding;Cooking;Housework;Shopping;Mobility   Can patient return to prior living arrangement Yes   Ability to make needs known: Poor   Family able to assist with home care needs: No   Would you like for me to discuss the discharge plan with any other family members/significant others, and if so, who? Yes  (mother at bedside)

## 2025-06-14 ENCOUNTER — APPOINTMENT (OUTPATIENT)
Dept: GENERAL RADIOLOGY | Age: 45
DRG: 987 | End: 2025-06-14
Attending: INTERNAL MEDICINE
Payer: COMMERCIAL

## 2025-06-14 PROBLEM — E87.20 METABOLIC ACIDOSIS: Status: ACTIVE | Noted: 2025-06-14

## 2025-06-14 PROBLEM — E87.6 HYPOKALEMIA: Status: ACTIVE | Noted: 2025-06-14

## 2025-06-14 LAB
1,25(OH)2D SERPL-MCNC: 143 PG/ML (ref 19.9–79.3)
ANION GAP SERPL CALC-SCNC: 13 MEQ/L (ref 8–16)
ANION GAP SERPL CALC-SCNC: 13 MEQ/L (ref 8–16)
ANION GAP SERPL CALC-SCNC: 14 MEQ/L (ref 8–16)
ANION GAP SERPL CALC-SCNC: 14 MEQ/L (ref 8–16)
BUN SERPL-MCNC: 26 MG/DL (ref 8–23)
BUN SERPL-MCNC: 27 MG/DL (ref 8–23)
BUN SERPL-MCNC: 28 MG/DL (ref 8–23)
BUN SERPL-MCNC: 30 MG/DL (ref 8–23)
CA-I BLD ISE-SCNC: 1.29 MMOL/L (ref 1.12–1.32)
CA-I BLD ISE-SCNC: 1.52 MMOL/L (ref 1.12–1.32)
CA-I BLD ISE-SCNC: 1.54 MMOL/L (ref 1.12–1.32)
CA-I BLD ISE-SCNC: 1.58 MMOL/L (ref 1.12–1.32)
CALCIUM SERPL-MCNC: 10.8 MG/DL (ref 8.6–10)
CALCIUM SERPL-MCNC: 11.7 MG/DL (ref 8.6–10)
CALCIUM SERPL-MCNC: 11.8 MG/DL (ref 8.6–10)
CALCIUM SERPL-MCNC: 11.9 MG/DL (ref 8.6–10)
CHLORIDE SERPL-SCNC: 118 MEQ/L (ref 98–111)
CHLORIDE SERPL-SCNC: 120 MEQ/L (ref 98–111)
CO2 SERPL-SCNC: 17 MEQ/L (ref 22–29)
CO2 SERPL-SCNC: 19 MEQ/L (ref 22–29)
CO2 SERPL-SCNC: 22 MEQ/L (ref 22–29)
CO2 SERPL-SCNC: 22 MEQ/L (ref 22–29)
CREAT SERPL-MCNC: 2.1 MG/DL (ref 0.5–0.9)
CREAT SERPL-MCNC: 2.2 MG/DL (ref 0.5–0.9)
GFR SERPL CREATININE-BSD FRML MDRD: 27 ML/MIN/1.73M2
GFR SERPL CREATININE-BSD FRML MDRD: 29 ML/MIN/1.73M2
GLUCOSE BLD STRIP.AUTO-MCNC: 143 MG/DL (ref 70–108)
GLUCOSE BLD STRIP.AUTO-MCNC: 180 MG/DL (ref 70–108)
GLUCOSE BLD STRIP.AUTO-MCNC: 263 MG/DL (ref 70–108)
GLUCOSE BLD STRIP.AUTO-MCNC: 382 MG/DL (ref 70–108)
GLUCOSE SERPL-MCNC: 160 MG/DL (ref 74–109)
GLUCOSE SERPL-MCNC: 181 MG/DL (ref 74–109)
GLUCOSE SERPL-MCNC: 335 MG/DL (ref 74–109)
GLUCOSE SERPL-MCNC: 415 MG/DL (ref 74–109)
MAGNESIUM SERPL-MCNC: 2.2 MG/DL (ref 1.6–2.6)
PHOSPHATE SERPL-MCNC: 2.3 MG/DL (ref 2.5–4.5)
POTASSIUM SERPL-SCNC: 3.1 MEQ/L (ref 3.5–5.2)
POTASSIUM SERPL-SCNC: 3.2 MEQ/L (ref 3.5–5.2)
POTASSIUM SERPL-SCNC: 3.5 MEQ/L (ref 3.5–5.2)
POTASSIUM SERPL-SCNC: 3.6 MEQ/L (ref 3.5–5.2)
SODIUM SERPL-SCNC: 149 MEQ/L (ref 135–145)
SODIUM SERPL-SCNC: 153 MEQ/L (ref 135–145)
SODIUM SERPL-SCNC: 155 MEQ/L (ref 135–145)
SODIUM SERPL-SCNC: 155 MEQ/L (ref 135–145)

## 2025-06-14 PROCEDURE — 6370000000 HC RX 637 (ALT 250 FOR IP): Performed by: PHYSICIAN ASSISTANT

## 2025-06-14 PROCEDURE — 6360000002 HC RX W HCPCS: Performed by: PHYSICIAN ASSISTANT

## 2025-06-14 PROCEDURE — 49465 FLUORO EXAM OF G/COLON TUBE: CPT

## 2025-06-14 PROCEDURE — 6360000004 HC RX CONTRAST MEDICATION: Performed by: INTERNAL MEDICINE

## 2025-06-14 PROCEDURE — 2580000003 HC RX 258: Performed by: INTERNAL MEDICINE

## 2025-06-14 PROCEDURE — 6370000000 HC RX 637 (ALT 250 FOR IP)

## 2025-06-14 PROCEDURE — 80048 BASIC METABOLIC PNL TOTAL CA: CPT

## 2025-06-14 PROCEDURE — 6360000002 HC RX W HCPCS: Performed by: INTERNAL MEDICINE

## 2025-06-14 PROCEDURE — 99233 SBSQ HOSP IP/OBS HIGH 50: CPT | Performed by: INTERNAL MEDICINE

## 2025-06-14 PROCEDURE — 84100 ASSAY OF PHOSPHORUS: CPT

## 2025-06-14 PROCEDURE — 82164 ANGIOTENSIN I ENZYME TEST: CPT

## 2025-06-14 PROCEDURE — 82330 ASSAY OF CALCIUM: CPT

## 2025-06-14 PROCEDURE — 6360000002 HC RX W HCPCS

## 2025-06-14 PROCEDURE — 6370000000 HC RX 637 (ALT 250 FOR IP): Performed by: INTERNAL MEDICINE

## 2025-06-14 PROCEDURE — 82948 REAGENT STRIP/BLOOD GLUCOSE: CPT

## 2025-06-14 PROCEDURE — 2500000003 HC RX 250 WO HCPCS: Performed by: PHYSICIAN ASSISTANT

## 2025-06-14 PROCEDURE — 83735 ASSAY OF MAGNESIUM: CPT

## 2025-06-14 PROCEDURE — 36415 COLL VENOUS BLD VENIPUNCTURE: CPT

## 2025-06-14 PROCEDURE — 1200000000 HC SEMI PRIVATE

## 2025-06-14 RX ORDER — DIATRIZOATE MEGLUMINE AND DIATRIZOATE SODIUM 660; 100 MG/ML; MG/ML
30 SOLUTION ORAL; RECTAL
Status: DISCONTINUED | OUTPATIENT
Start: 2025-06-14 | End: 2025-06-23 | Stop reason: SDUPTHER

## 2025-06-14 RX ORDER — INSULIN GLARGINE 100 [IU]/ML
15 INJECTION, SOLUTION SUBCUTANEOUS NIGHTLY
Status: DISCONTINUED | OUTPATIENT
Start: 2025-06-14 | End: 2025-06-20

## 2025-06-14 RX ORDER — INSULIN LISPRO 100 [IU]/ML
0-16 INJECTION, SOLUTION INTRAVENOUS; SUBCUTANEOUS
Status: DISCONTINUED | OUTPATIENT
Start: 2025-06-14 | End: 2025-06-18

## 2025-06-14 RX ORDER — COSYNTROPIN 0.25 MG/ML
250 INJECTION, POWDER, FOR SOLUTION INTRAMUSCULAR; INTRAVENOUS ONCE
Status: COMPLETED | OUTPATIENT
Start: 2025-06-16 | End: 2025-06-16

## 2025-06-14 RX ORDER — DEXAMETHASONE SODIUM PHOSPHATE 4 MG/ML
2 INJECTION, SOLUTION INTRA-ARTICULAR; INTRALESIONAL; INTRAMUSCULAR; INTRAVENOUS; SOFT TISSUE EVERY 12 HOURS
Status: DISCONTINUED | OUTPATIENT
Start: 2025-06-14 | End: 2025-06-17

## 2025-06-14 RX ADMIN — HEPARIN SODIUM 5000 UNITS: 5000 INJECTION INTRAVENOUS; SUBCUTANEOUS at 15:53

## 2025-06-14 RX ADMIN — HEPARIN SODIUM 5000 UNITS: 5000 INJECTION INTRAVENOUS; SUBCUTANEOUS at 00:06

## 2025-06-14 RX ADMIN — METOPROLOL TARTRATE 12.5 MG: 25 TABLET, FILM COATED ORAL at 11:25

## 2025-06-14 RX ADMIN — DIATRIZOATE MEGLUMINE AND DIATRIZOATE SODIUM 30 ML: 600; 100 SOLUTION ORAL; RECTAL at 10:58

## 2025-06-14 RX ADMIN — HYDROCORTISONE SODIUM SUCCINATE 100 MG: 100 INJECTION, POWDER, FOR SOLUTION INTRAMUSCULAR; INTRAVENOUS at 12:17

## 2025-06-14 RX ADMIN — PANTOPRAZOLE SODIUM 40 MG: 40 INJECTION, POWDER, FOR SOLUTION INTRAVENOUS at 09:46

## 2025-06-14 RX ADMIN — SODIUM CHLORIDE, PRESERVATIVE FREE 10 ML: 5 INJECTION INTRAVENOUS at 09:49

## 2025-06-14 RX ADMIN — SENNOSIDES, DOCUSATE SODIUM 1 TABLET: 50; 8.6 TABLET, FILM COATED ORAL at 11:25

## 2025-06-14 RX ADMIN — ACETAMINOPHEN 650 MG: 325 TABLET ORAL at 21:33

## 2025-06-14 RX ADMIN — SODIUM CHLORIDE: 4.5 INJECTION, SOLUTION INTRAVENOUS at 05:51

## 2025-06-14 RX ADMIN — INSULIN LISPRO 8 UNITS: 100 INJECTION, SOLUTION INTRAVENOUS; SUBCUTANEOUS at 17:32

## 2025-06-14 RX ADMIN — INSULIN GLARGINE 15 UNITS: 100 INJECTION, SOLUTION SUBCUTANEOUS at 21:36

## 2025-06-14 RX ADMIN — CYCLOBENZAPRINE 5 MG: 10 TABLET, FILM COATED ORAL at 13:33

## 2025-06-14 RX ADMIN — POTASSIUM CHLORIDE: 2 INJECTION, SOLUTION, CONCENTRATE INTRAVENOUS at 11:53

## 2025-06-14 RX ADMIN — INSULIN LISPRO 2 UNITS: 100 INJECTION, SOLUTION INTRAVENOUS; SUBCUTANEOUS at 12:17

## 2025-06-14 RX ADMIN — DEXAMETHASONE SODIUM PHOSPHATE 2 MG: 4 INJECTION, SOLUTION INTRA-ARTICULAR; INTRALESIONAL; INTRAMUSCULAR; INTRAVENOUS; SOFT TISSUE at 21:34

## 2025-06-14 RX ADMIN — FERROUS SULFATE TAB 325 MG (65 MG ELEMENTAL FE) 325 MG: 325 (65 FE) TAB at 15:53

## 2025-06-14 RX ADMIN — METOPROLOL TARTRATE 12.5 MG: 25 TABLET, FILM COATED ORAL at 21:35

## 2025-06-14 RX ADMIN — FLUOXETINE HYDROCHLORIDE 40 MG: 20 CAPSULE ORAL at 11:25

## 2025-06-14 RX ADMIN — SENNOSIDES, DOCUSATE SODIUM 1 TABLET: 50; 8.6 TABLET, FILM COATED ORAL at 21:35

## 2025-06-14 RX ADMIN — MIRTAZAPINE 7.5 MG: 7.5 TABLET, FILM COATED ORAL at 21:36

## 2025-06-14 RX ADMIN — HYDROCORTISONE SODIUM SUCCINATE 100 MG: 100 INJECTION, POWDER, FOR SOLUTION INTRAMUSCULAR; INTRAVENOUS at 05:31

## 2025-06-14 RX ADMIN — INSULIN LISPRO 8 UNITS: 100 INJECTION, SOLUTION INTRAVENOUS; SUBCUTANEOUS at 21:32

## 2025-06-14 RX ADMIN — HEPARIN SODIUM 5000 UNITS: 5000 INJECTION INTRAVENOUS; SUBCUTANEOUS at 09:46

## 2025-06-14 RX ADMIN — CYCLOBENZAPRINE 5 MG: 10 TABLET, FILM COATED ORAL at 21:32

## 2025-06-14 RX ADMIN — FERROUS SULFATE TAB 325 MG (65 MG ELEMENTAL FE) 325 MG: 325 (65 FE) TAB at 11:25

## 2025-06-14 RX ADMIN — POTASSIUM CHLORIDE: 2 INJECTION, SOLUTION, CONCENTRATE INTRAVENOUS at 21:50

## 2025-06-14 RX ADMIN — 0.12% CHLORHEXIDINE GLUCONATE 15 ML: 1.2 RINSE ORAL at 09:46

## 2025-06-14 RX ADMIN — 0.12% CHLORHEXIDINE GLUCONATE 15 ML: 1.2 RINSE ORAL at 21:32

## 2025-06-14 RX ADMIN — INSULIN LISPRO 6 UNITS: 100 INJECTION, SOLUTION INTRAVENOUS; SUBCUTANEOUS at 00:05

## 2025-06-14 ASSESSMENT — PAIN DESCRIPTION - ORIENTATION
ORIENTATION: RIGHT;LEFT
ORIENTATION: MID;UPPER

## 2025-06-14 ASSESSMENT — PAIN SCALES - WONG BAKER
WONGBAKER_NUMERICALRESPONSE: NO HURT
WONGBAKER_NUMERICALRESPONSE: NO HURT
WONGBAKER_NUMERICALRESPONSE: HURTS A LITTLE BIT

## 2025-06-14 ASSESSMENT — PAIN - FUNCTIONAL ASSESSMENT: PAIN_FUNCTIONAL_ASSESSMENT: PREVENTS OR INTERFERES WITH MANY ACTIVE NOT PASSIVE ACTIVITIES

## 2025-06-14 ASSESSMENT — PAIN DESCRIPTION - DESCRIPTORS
DESCRIPTORS: OTHER (COMMENT)
DESCRIPTORS: SORE

## 2025-06-14 ASSESSMENT — PAIN DESCRIPTION - LOCATION
LOCATION: ABDOMEN
LOCATION: LEG

## 2025-06-14 NOTE — SIGNIFICANT EVENT
Increased insulin from 8 units Lantus to 15 units as patient is getting D5 at a rapid rate.  Also change sliding scale from medium dose to high-dose sliding scale.  Please adjust insulin according to her sugars overnight.  If needed can given small dose of Lantus in the a.m.    Electronically signed by Quincy Adams DO on 6/14/2025 at 5:59 PM

## 2025-06-15 ENCOUNTER — APPOINTMENT (OUTPATIENT)
Dept: CT IMAGING | Age: 45
DRG: 987 | End: 2025-06-15
Attending: INTERNAL MEDICINE
Payer: COMMERCIAL

## 2025-06-15 PROBLEM — R73.9 HYPERGLYCEMIA: Status: ACTIVE | Noted: 2025-06-15

## 2025-06-15 LAB
ANION GAP SERPL CALC-SCNC: 12 MEQ/L (ref 8–16)
ANION GAP SERPL CALC-SCNC: 12 MEQ/L (ref 8–16)
ANION GAP SERPL CALC-SCNC: 13 MEQ/L (ref 8–16)
ANION GAP SERPL CALC-SCNC: 13 MEQ/L (ref 8–16)
BUN SERPL-MCNC: 29 MG/DL (ref 8–23)
BUN SERPL-MCNC: 29 MG/DL (ref 8–23)
BUN SERPL-MCNC: 30 MG/DL (ref 8–23)
BUN SERPL-MCNC: 31 MG/DL (ref 8–23)
CA-I BLD ISE-SCNC: 1.35 MMOL/L (ref 1.12–1.32)
CA-I BLD ISE-SCNC: 1.44 MMOL/L (ref 1.12–1.32)
CA-I BLD ISE-SCNC: 1.5 MMOL/L (ref 1.12–1.32)
CA-I BLD ISE-SCNC: 1.57 MMOL/L (ref 1.12–1.32)
CALCIUM SERPL-MCNC: 10.7 MG/DL (ref 8.6–10)
CALCIUM SERPL-MCNC: 11 MG/DL (ref 8.6–10)
CALCIUM SERPL-MCNC: 11.3 MG/DL (ref 8.6–10)
CALCIUM SERPL-MCNC: 11.5 MG/DL (ref 8.6–10)
CHLORIDE SERPL-SCNC: 113 MEQ/L (ref 98–111)
CHLORIDE SERPL-SCNC: 115 MEQ/L (ref 98–111)
CHLORIDE SERPL-SCNC: 120 MEQ/L (ref 98–111)
CHLORIDE SERPL-SCNC: 122 MEQ/L (ref 98–111)
CO2 SERPL-SCNC: 18 MEQ/L (ref 22–29)
CO2 SERPL-SCNC: 18 MEQ/L (ref 22–29)
CO2 SERPL-SCNC: 19 MEQ/L (ref 22–29)
CO2 SERPL-SCNC: 20 MEQ/L (ref 22–29)
CREAT SERPL-MCNC: 1.8 MG/DL (ref 0.5–0.9)
CREAT SERPL-MCNC: 1.8 MG/DL (ref 0.5–0.9)
CREAT SERPL-MCNC: 1.9 MG/DL (ref 0.5–0.9)
CREAT SERPL-MCNC: 2 MG/DL (ref 0.5–0.9)
GFR SERPL CREATININE-BSD FRML MDRD: 31 ML/MIN/1.73M2
GFR SERPL CREATININE-BSD FRML MDRD: 33 ML/MIN/1.73M2
GFR SERPL CREATININE-BSD FRML MDRD: 35 ML/MIN/1.73M2
GFR SERPL CREATININE-BSD FRML MDRD: 35 ML/MIN/1.73M2
GLUCOSE BLD STRIP.AUTO-MCNC: 230 MG/DL (ref 70–108)
GLUCOSE BLD STRIP.AUTO-MCNC: 288 MG/DL (ref 70–108)
GLUCOSE BLD STRIP.AUTO-MCNC: 346 MG/DL (ref 70–108)
GLUCOSE BLD STRIP.AUTO-MCNC: 414 MG/DL (ref 70–108)
GLUCOSE SERPL-MCNC: 169 MG/DL (ref 74–109)
GLUCOSE SERPL-MCNC: 250 MG/DL (ref 74–109)
GLUCOSE SERPL-MCNC: 290 MG/DL (ref 74–109)
GLUCOSE SERPL-MCNC: 403 MG/DL (ref 74–109)
PHOSPHATE SERPL-MCNC: 3 MG/DL (ref 2.5–4.5)
POTASSIUM SERPL-SCNC: 3.7 MEQ/L (ref 3.5–5.2)
POTASSIUM SERPL-SCNC: 4.4 MEQ/L (ref 3.5–5.2)
POTASSIUM SERPL-SCNC: 4.6 MEQ/L (ref 3.5–5.2)
POTASSIUM SERPL-SCNC: 4.7 MEQ/L (ref 3.5–5.2)
SODIUM SERPL-SCNC: 144 MEQ/L (ref 135–145)
SODIUM SERPL-SCNC: 145 MEQ/L (ref 135–145)
SODIUM SERPL-SCNC: 151 MEQ/L (ref 135–145)
SODIUM SERPL-SCNC: 155 MEQ/L (ref 135–145)

## 2025-06-15 PROCEDURE — 1200000000 HC SEMI PRIVATE

## 2025-06-15 PROCEDURE — 99233 SBSQ HOSP IP/OBS HIGH 50: CPT | Performed by: INTERNAL MEDICINE

## 2025-06-15 PROCEDURE — 82330 ASSAY OF CALCIUM: CPT

## 2025-06-15 PROCEDURE — 80048 BASIC METABOLIC PNL TOTAL CA: CPT

## 2025-06-15 PROCEDURE — 6370000000 HC RX 637 (ALT 250 FOR IP): Performed by: INTERNAL MEDICINE

## 2025-06-15 PROCEDURE — 99233 SBSQ HOSP IP/OBS HIGH 50: CPT | Performed by: PHYSICIAN ASSISTANT

## 2025-06-15 PROCEDURE — 6370000000 HC RX 637 (ALT 250 FOR IP): Performed by: PHYSICIAN ASSISTANT

## 2025-06-15 PROCEDURE — 6360000002 HC RX W HCPCS: Performed by: INTERNAL MEDICINE

## 2025-06-15 PROCEDURE — 6360000002 HC RX W HCPCS: Performed by: PHYSICIAN ASSISTANT

## 2025-06-15 PROCEDURE — 36415 COLL VENOUS BLD VENIPUNCTURE: CPT

## 2025-06-15 PROCEDURE — 84100 ASSAY OF PHOSPHORUS: CPT

## 2025-06-15 PROCEDURE — 71250 CT THORAX DX C-: CPT

## 2025-06-15 PROCEDURE — 6360000002 HC RX W HCPCS

## 2025-06-15 PROCEDURE — 2580000003 HC RX 258: Performed by: INTERNAL MEDICINE

## 2025-06-15 PROCEDURE — 6370000000 HC RX 637 (ALT 250 FOR IP)

## 2025-06-15 PROCEDURE — 82948 REAGENT STRIP/BLOOD GLUCOSE: CPT

## 2025-06-15 RX ORDER — CALCITONIN SALMON 200 [USP'U]/ML
100 INJECTION, SOLUTION INTRAMUSCULAR; SUBCUTANEOUS EVERY 12 HOURS
Status: COMPLETED | OUTPATIENT
Start: 2025-06-15 | End: 2025-06-16

## 2025-06-15 RX ADMIN — HEPARIN SODIUM 5000 UNITS: 5000 INJECTION INTRAVENOUS; SUBCUTANEOUS at 01:30

## 2025-06-15 RX ADMIN — CALCITONIN SALMON 100 UNITS: 200 INJECTION, SOLUTION INTRAMUSCULAR; SUBCUTANEOUS at 17:27

## 2025-06-15 RX ADMIN — 0.12% CHLORHEXIDINE GLUCONATE 15 ML: 1.2 RINSE ORAL at 07:56

## 2025-06-15 RX ADMIN — INSULIN GLARGINE 15 UNITS: 100 INJECTION, SOLUTION SUBCUTANEOUS at 21:48

## 2025-06-15 RX ADMIN — POTASSIUM CHLORIDE: 2 INJECTION, SOLUTION, CONCENTRATE INTRAVENOUS at 11:55

## 2025-06-15 RX ADMIN — ACETAMINOPHEN 650 MG: 325 TABLET ORAL at 23:55

## 2025-06-15 RX ADMIN — MIRTAZAPINE 7.5 MG: 7.5 TABLET, FILM COATED ORAL at 21:49

## 2025-06-15 RX ADMIN — ACETAMINOPHEN 650 MG: 325 TABLET ORAL at 06:19

## 2025-06-15 RX ADMIN — CYCLOBENZAPRINE 5 MG: 10 TABLET, FILM COATED ORAL at 23:55

## 2025-06-15 RX ADMIN — LANSOPRAZOLE 15 MG: 15 TABLET, ORALLY DISINTEGRATING, DELAYED RELEASE ORAL at 06:19

## 2025-06-15 RX ADMIN — HEPARIN SODIUM 5000 UNITS: 5000 INJECTION INTRAVENOUS; SUBCUTANEOUS at 23:54

## 2025-06-15 RX ADMIN — 0.12% CHLORHEXIDINE GLUCONATE 15 ML: 1.2 RINSE ORAL at 21:47

## 2025-06-15 RX ADMIN — Medication 6 MG: at 21:49

## 2025-06-15 RX ADMIN — INSULIN LISPRO 8 UNITS: 100 INJECTION, SOLUTION INTRAVENOUS; SUBCUTANEOUS at 21:48

## 2025-06-15 RX ADMIN — POTASSIUM CHLORIDE: 2 INJECTION, SOLUTION, CONCENTRATE INTRAVENOUS at 18:02

## 2025-06-15 RX ADMIN — FLUOXETINE HYDROCHLORIDE 40 MG: 20 CAPSULE ORAL at 08:13

## 2025-06-15 RX ADMIN — METOPROLOL TARTRATE 12.5 MG: 25 TABLET, FILM COATED ORAL at 21:48

## 2025-06-15 RX ADMIN — POTASSIUM CHLORIDE: 2 INJECTION, SOLUTION, CONCENTRATE INTRAVENOUS at 06:16

## 2025-06-15 RX ADMIN — DEXAMETHASONE SODIUM PHOSPHATE 2 MG: 4 INJECTION, SOLUTION INTRA-ARTICULAR; INTRALESIONAL; INTRAMUSCULAR; INTRAVENOUS; SOFT TISSUE at 21:47

## 2025-06-15 RX ADMIN — INSULIN LISPRO 16 UNITS: 100 INJECTION, SOLUTION INTRAVENOUS; SUBCUTANEOUS at 11:13

## 2025-06-15 RX ADMIN — CYCLOBENZAPRINE 5 MG: 10 TABLET, FILM COATED ORAL at 15:15

## 2025-06-15 RX ADMIN — DEXAMETHASONE SODIUM PHOSPHATE 2 MG: 4 INJECTION, SOLUTION INTRA-ARTICULAR; INTRALESIONAL; INTRAMUSCULAR; INTRAVENOUS; SOFT TISSUE at 07:56

## 2025-06-15 RX ADMIN — INSULIN LISPRO 12 UNITS: 100 INJECTION, SOLUTION INTRAVENOUS; SUBCUTANEOUS at 15:15

## 2025-06-15 RX ADMIN — ACETAMINOPHEN 650 MG: 325 TABLET ORAL at 15:16

## 2025-06-15 RX ADMIN — INSULIN LISPRO 4 UNITS: 100 INJECTION, SOLUTION INTRAVENOUS; SUBCUTANEOUS at 06:45

## 2025-06-15 RX ADMIN — HEPARIN SODIUM 5000 UNITS: 5000 INJECTION INTRAVENOUS; SUBCUTANEOUS at 07:56

## 2025-06-15 RX ADMIN — HEPARIN SODIUM 5000 UNITS: 5000 INJECTION INTRAVENOUS; SUBCUTANEOUS at 17:27

## 2025-06-15 RX ADMIN — METOPROLOL TARTRATE 12.5 MG: 25 TABLET, FILM COATED ORAL at 08:13

## 2025-06-16 ENCOUNTER — TELEPHONE (OUTPATIENT)
Dept: PULMONOLOGY | Age: 45
End: 2025-06-16

## 2025-06-16 ENCOUNTER — ANESTHESIA (OUTPATIENT)
Dept: ENDOSCOPY | Age: 45
End: 2025-06-16
Payer: COMMERCIAL

## 2025-06-16 ENCOUNTER — ANESTHESIA EVENT (OUTPATIENT)
Dept: ENDOSCOPY | Age: 45
End: 2025-06-16
Payer: COMMERCIAL

## 2025-06-16 LAB
ACE SERPL-CCNC: 133 U/L (ref 16–85)
ANION GAP SERPL CALC-SCNC: 11 MEQ/L (ref 8–16)
ANION GAP SERPL CALC-SCNC: 12 MEQ/L (ref 8–16)
ANION GAP SERPL CALC-SCNC: 16 MEQ/L (ref 8–16)
BUN SERPL-MCNC: 26 MG/DL (ref 8–23)
BUN SERPL-MCNC: 27 MG/DL (ref 8–23)
BUN SERPL-MCNC: 28 MG/DL (ref 8–23)
CA-I BLD ISE-SCNC: 1.34 MMOL/L (ref 1.12–1.32)
CALCIUM SERPL-MCNC: 11 MG/DL (ref 8.6–10)
CALCIUM SERPL-MCNC: 9.8 MG/DL (ref 8.6–10)
CALCIUM SERPL-MCNC: 9.9 MG/DL (ref 8.6–10)
CHLORIDE SERPL-SCNC: 115 MEQ/L (ref 98–111)
CHLORIDE SERPL-SCNC: 116 MEQ/L (ref 98–111)
CHLORIDE SERPL-SCNC: 118 MEQ/L (ref 98–111)
CO2 SERPL-SCNC: 16 MEQ/L (ref 22–29)
CO2 SERPL-SCNC: 19 MEQ/L (ref 22–29)
CO2 SERPL-SCNC: 19 MEQ/L (ref 22–29)
CORTIS SERPL-MCNC: 1.2 UG/DL
CORTIS SERPL-MCNC: 10.2 UG/DL
CORTIS SERPL-MCNC: 6.9 UG/DL
CORTISOL COLLECTION INFO: NORMAL
CREAT SERPL-MCNC: 1.5 MG/DL (ref 0.5–0.9)
CREAT SERPL-MCNC: 1.6 MG/DL (ref 0.5–0.9)
CREAT SERPL-MCNC: 1.7 MG/DL (ref 0.5–0.9)
DEPRECATED MEAN GLUCOSE BLD GHB EST-ACNC: 150 MG/DL (ref 70–126)
GFR SERPL CREATININE-BSD FRML MDRD: 37 ML/MIN/1.73M2
GFR SERPL CREATININE-BSD FRML MDRD: 40 ML/MIN/1.73M2
GFR SERPL CREATININE-BSD FRML MDRD: 44 ML/MIN/1.73M2
GLUCOSE BLD STRIP.AUTO-MCNC: 128 MG/DL (ref 70–108)
GLUCOSE BLD STRIP.AUTO-MCNC: 144 MG/DL (ref 70–108)
GLUCOSE BLD STRIP.AUTO-MCNC: 192 MG/DL (ref 70–108)
GLUCOSE BLD STRIP.AUTO-MCNC: 215 MG/DL (ref 70–108)
GLUCOSE BLD STRIP.AUTO-MCNC: 393 MG/DL (ref 70–108)
GLUCOSE SERPL-MCNC: 174 MG/DL (ref 74–109)
GLUCOSE SERPL-MCNC: 190 MG/DL (ref 74–109)
GLUCOSE SERPL-MCNC: 211 MG/DL (ref 74–109)
HBA1C MFR BLD HPLC: 7 % (ref 4–6)
OSMOLALITY UR: 153 MOSMOL/KG (ref 250–750)
POTASSIUM SERPL-SCNC: 4.6 MEQ/L (ref 3.5–5.2)
POTASSIUM SERPL-SCNC: 5 MEQ/L (ref 3.5–5.2)
POTASSIUM SERPL-SCNC: 5.2 MEQ/L (ref 3.5–5.2)
SODIUM SERPL-SCNC: 145 MEQ/L (ref 135–145)
SODIUM SERPL-SCNC: 148 MEQ/L (ref 135–145)
SODIUM SERPL-SCNC: 149 MEQ/L (ref 135–145)
SODIUM UR-SCNC: 32 MEQ/L

## 2025-06-16 PROCEDURE — 6370000000 HC RX 637 (ALT 250 FOR IP)

## 2025-06-16 PROCEDURE — 83935 ASSAY OF URINE OSMOLALITY: CPT

## 2025-06-16 PROCEDURE — 3700000000 HC ANESTHESIA ATTENDED CARE: Performed by: INTERNAL MEDICINE

## 2025-06-16 PROCEDURE — 2720000010 HC SURG SUPPLY STERILE: Performed by: INTERNAL MEDICINE

## 2025-06-16 PROCEDURE — 89051 BODY FLUID CELL COUNT: CPT

## 2025-06-16 PROCEDURE — 82533 TOTAL CORTISOL: CPT

## 2025-06-16 PROCEDURE — 80400 ACTH STIMULATION PANEL: CPT

## 2025-06-16 PROCEDURE — 87070 CULTURE OTHR SPECIMN AEROBIC: CPT

## 2025-06-16 PROCEDURE — 6360000002 HC RX W HCPCS

## 2025-06-16 PROCEDURE — 6360000002 HC RX W HCPCS: Performed by: INTERNAL MEDICINE

## 2025-06-16 PROCEDURE — 99232 SBSQ HOSP IP/OBS MODERATE 35: CPT | Performed by: INTERNAL MEDICINE

## 2025-06-16 PROCEDURE — 1200000000 HC SEMI PRIVATE

## 2025-06-16 PROCEDURE — 88305 TISSUE EXAM BY PATHOLOGIST: CPT

## 2025-06-16 PROCEDURE — 97166 OT EVAL MOD COMPLEX 45 MIN: CPT

## 2025-06-16 PROCEDURE — 7100000001 HC PACU RECOVERY - ADDTL 15 MIN: Performed by: INTERNAL MEDICINE

## 2025-06-16 PROCEDURE — 80048 BASIC METABOLIC PNL TOTAL CA: CPT

## 2025-06-16 PROCEDURE — 2580000003 HC RX 258: Performed by: PHYSICIAN ASSISTANT

## 2025-06-16 PROCEDURE — 0BBJ8ZX EXCISION OF LEFT LOWER LUNG LOBE, VIA NATURAL OR ARTIFICIAL OPENING ENDOSCOPIC, DIAGNOSTIC: ICD-10-PCS | Performed by: INTERNAL MEDICINE

## 2025-06-16 PROCEDURE — 6360000002 HC RX W HCPCS: Performed by: PHYSICIAN ASSISTANT

## 2025-06-16 PROCEDURE — 3609020000 HC BRONCHOSCOPY W/EBUS FNA 3/> NODE: Performed by: INTERNAL MEDICINE

## 2025-06-16 PROCEDURE — 3609010800 HC BRONCHOSCOPY ALVEOLAR LAVAGE: Performed by: INTERNAL MEDICINE

## 2025-06-16 PROCEDURE — 02HV33Z INSERTION OF INFUSION DEVICE INTO SUPERIOR VENA CAVA, PERCUTANEOUS APPROACH: ICD-10-PCS | Performed by: INTERNAL MEDICINE

## 2025-06-16 PROCEDURE — 2580000003 HC RX 258: Performed by: INTERNAL MEDICINE

## 2025-06-16 PROCEDURE — 87116 MYCOBACTERIA CULTURE: CPT

## 2025-06-16 PROCEDURE — 83036 HEMOGLOBIN GLYCOSYLATED A1C: CPT

## 2025-06-16 PROCEDURE — 6370000000 HC RX 637 (ALT 250 FOR IP): Performed by: PHYSICIAN ASSISTANT

## 2025-06-16 PROCEDURE — 82330 ASSAY OF CALCIUM: CPT

## 2025-06-16 PROCEDURE — 88185 FLOWCYTOMETRY/TC ADD-ON: CPT

## 2025-06-16 PROCEDURE — 88112 CYTOPATH CELL ENHANCE TECH: CPT

## 2025-06-16 PROCEDURE — 84300 ASSAY OF URINE SODIUM: CPT

## 2025-06-16 PROCEDURE — 87205 SMEAR GRAM STAIN: CPT

## 2025-06-16 PROCEDURE — 36415 COLL VENOUS BLD VENIPUNCTURE: CPT

## 2025-06-16 PROCEDURE — 99233 SBSQ HOSP IP/OBS HIGH 50: CPT | Performed by: PHYSICIAN ASSISTANT

## 2025-06-16 PROCEDURE — 82948 REAGENT STRIP/BLOOD GLUCOSE: CPT

## 2025-06-16 PROCEDURE — 88184 FLOWCYTOMETRY/ TC 1 MARKER: CPT

## 2025-06-16 PROCEDURE — 6360000002 HC RX W HCPCS: Performed by: NURSE ANESTHETIST, CERTIFIED REGISTERED

## 2025-06-16 PROCEDURE — 88173 CYTOPATH EVAL FNA REPORT: CPT

## 2025-06-16 PROCEDURE — 07D78ZX EXTRACTION OF THORAX LYMPHATIC, VIA NATURAL OR ARTIFICIAL OPENING ENDOSCOPIC, DIAGNOSTIC: ICD-10-PCS | Performed by: INTERNAL MEDICINE

## 2025-06-16 PROCEDURE — 3700000001 HC ADD 15 MINUTES (ANESTHESIA): Performed by: INTERNAL MEDICINE

## 2025-06-16 PROCEDURE — 6370000000 HC RX 637 (ALT 250 FOR IP): Performed by: INTERNAL MEDICINE

## 2025-06-16 PROCEDURE — 87102 FUNGUS ISOLATION CULTURE: CPT

## 2025-06-16 PROCEDURE — 7100000000 HC PACU RECOVERY - FIRST 15 MIN: Performed by: INTERNAL MEDICINE

## 2025-06-16 PROCEDURE — 2500000003 HC RX 250 WO HCPCS: Performed by: NURSE ANESTHETIST, CERTIFIED REGISTERED

## 2025-06-16 PROCEDURE — 0B9J8ZX DRAINAGE OF LEFT LOWER LUNG LOBE, VIA NATURAL OR ARTIFICIAL OPENING ENDOSCOPIC, DIAGNOSTIC: ICD-10-PCS | Performed by: INTERNAL MEDICINE

## 2025-06-16 PROCEDURE — 2580000003 HC RX 258: Performed by: NURSE ANESTHETIST, CERTIFIED REGISTERED

## 2025-06-16 PROCEDURE — 97110 THERAPEUTIC EXERCISES: CPT

## 2025-06-16 RX ORDER — ONDANSETRON 2 MG/ML
INJECTION INTRAMUSCULAR; INTRAVENOUS
Status: DISCONTINUED | OUTPATIENT
Start: 2025-06-16 | End: 2025-06-16 | Stop reason: SDUPTHER

## 2025-06-16 RX ORDER — SODIUM CHLORIDE 9 MG/ML
INJECTION, SOLUTION INTRAVENOUS PRN
Status: CANCELLED | OUTPATIENT
Start: 2025-06-16

## 2025-06-16 RX ORDER — FENTANYL CITRATE 50 UG/ML
INJECTION, SOLUTION INTRAMUSCULAR; INTRAVENOUS
Status: DISCONTINUED | OUTPATIENT
Start: 2025-06-16 | End: 2025-06-16 | Stop reason: SDUPTHER

## 2025-06-16 RX ORDER — ROCURONIUM BROMIDE 10 MG/ML
INJECTION, SOLUTION INTRAVENOUS
Status: DISCONTINUED | OUTPATIENT
Start: 2025-06-16 | End: 2025-06-16 | Stop reason: SDUPTHER

## 2025-06-16 RX ORDER — LIDOCAINE HYDROCHLORIDE 20 MG/ML
INJECTION, SOLUTION EPIDURAL; INFILTRATION; INTRACAUDAL; PERINEURAL
Status: DISCONTINUED | OUTPATIENT
Start: 2025-06-16 | End: 2025-06-16 | Stop reason: SDUPTHER

## 2025-06-16 RX ORDER — LIDOCAINE HYDROCHLORIDE 40 MG/ML
2.5 INJECTION, SOLUTION RETROBULBAR ONCE
Status: CANCELLED | OUTPATIENT
Start: 2025-06-16 | End: 2025-06-16

## 2025-06-16 RX ORDER — DEXTROSE MONOHYDRATE 50 MG/ML
INJECTION, SOLUTION INTRAVENOUS CONTINUOUS
Status: DISCONTINUED | OUTPATIENT
Start: 2025-06-16 | End: 2025-06-17

## 2025-06-16 RX ORDER — SODIUM CHLORIDE 0.9 % (FLUSH) 0.9 %
5-40 SYRINGE (ML) INJECTION PRN
Status: CANCELLED | OUTPATIENT
Start: 2025-06-16

## 2025-06-16 RX ORDER — PHENYLEPHRINE HCL IN 0.9% NACL 1 MG/10 ML
SYRINGE (ML) INTRAVENOUS
Status: DISCONTINUED | OUTPATIENT
Start: 2025-06-16 | End: 2025-06-16 | Stop reason: SDUPTHER

## 2025-06-16 RX ORDER — SODIUM CHLORIDE 0.9 % (FLUSH) 0.9 %
5-40 SYRINGE (ML) INJECTION EVERY 12 HOURS SCHEDULED
Status: CANCELLED | OUTPATIENT
Start: 2025-06-16

## 2025-06-16 RX ORDER — PROPOFOL 10 MG/ML
INJECTION, EMULSION INTRAVENOUS
Status: DISCONTINUED | OUTPATIENT
Start: 2025-06-16 | End: 2025-06-16 | Stop reason: SDUPTHER

## 2025-06-16 RX ORDER — SODIUM CHLORIDE 9 MG/ML
INJECTION, SOLUTION INTRAVENOUS
Status: DISCONTINUED | OUTPATIENT
Start: 2025-06-16 | End: 2025-06-16 | Stop reason: SDUPTHER

## 2025-06-16 RX ORDER — DEXAMETHASONE SODIUM PHOSPHATE 4 MG/ML
INJECTION, SOLUTION INTRA-ARTICULAR; INTRALESIONAL; INTRAMUSCULAR; INTRAVENOUS; SOFT TISSUE
Status: DISCONTINUED | OUTPATIENT
Start: 2025-06-16 | End: 2025-06-16 | Stop reason: SDUPTHER

## 2025-06-16 RX ADMIN — CYCLOBENZAPRINE 5 MG: 10 TABLET, FILM COATED ORAL at 17:41

## 2025-06-16 RX ADMIN — INSULIN LISPRO 4 UNITS: 100 INJECTION, SOLUTION INTRAVENOUS; SUBCUTANEOUS at 09:16

## 2025-06-16 RX ADMIN — PAMIDRONATE DISODIUM 90 MG: 9 INJECTION, SOLUTION INTRAVENOUS at 09:20

## 2025-06-16 RX ADMIN — FLUOXETINE HYDROCHLORIDE 40 MG: 20 CAPSULE ORAL at 09:23

## 2025-06-16 RX ADMIN — DEXAMETHASONE SODIUM PHOSPHATE 8 MG: 4 INJECTION, SOLUTION INTRAMUSCULAR; INTRAVENOUS at 14:41

## 2025-06-16 RX ADMIN — INSULIN LISPRO 16 UNITS: 100 INJECTION, SOLUTION INTRAVENOUS; SUBCUTANEOUS at 21:20

## 2025-06-16 RX ADMIN — 0.12% CHLORHEXIDINE GLUCONATE 15 ML: 1.2 RINSE ORAL at 21:20

## 2025-06-16 RX ADMIN — 0.12% CHLORHEXIDINE GLUCONATE 15 ML: 1.2 RINSE ORAL at 09:13

## 2025-06-16 RX ADMIN — METOPROLOL TARTRATE 12.5 MG: 25 TABLET, FILM COATED ORAL at 09:23

## 2025-06-16 RX ADMIN — HEPARIN SODIUM 5000 UNITS: 5000 INJECTION INTRAVENOUS; SUBCUTANEOUS at 16:36

## 2025-06-16 RX ADMIN — CALCITONIN SALMON 100 UNITS: 200 INJECTION, SOLUTION INTRAMUSCULAR; SUBCUTANEOUS at 06:06

## 2025-06-16 RX ADMIN — MIRTAZAPINE 7.5 MG: 7.5 TABLET, FILM COATED ORAL at 21:20

## 2025-06-16 RX ADMIN — ACETAMINOPHEN 650 MG: 325 TABLET ORAL at 17:40

## 2025-06-16 RX ADMIN — FENTANYL CITRATE 25 MCG: 50 INJECTION, SOLUTION INTRAMUSCULAR; INTRAVENOUS at 14:41

## 2025-06-16 RX ADMIN — Medication 100 MCG: at 15:04

## 2025-06-16 RX ADMIN — ROCURONIUM BROMIDE 50 MG: 10 INJECTION INTRAVENOUS at 14:41

## 2025-06-16 RX ADMIN — Medication 6 MG: at 21:22

## 2025-06-16 RX ADMIN — DEXTROSE: 5 SOLUTION INTRAVENOUS at 16:36

## 2025-06-16 RX ADMIN — LIDOCAINE HYDROCHLORIDE 50 MG: 20 INJECTION, SOLUTION EPIDURAL; INFILTRATION; INTRACAUDAL; PERINEURAL at 14:41

## 2025-06-16 RX ADMIN — HEPARIN SODIUM 5000 UNITS: 5000 INJECTION INTRAVENOUS; SUBCUTANEOUS at 09:15

## 2025-06-16 RX ADMIN — SODIUM CHLORIDE: 9 INJECTION, SOLUTION INTRAVENOUS at 14:38

## 2025-06-16 RX ADMIN — COSYNTROPIN 250 MCG: 0.25 INJECTION, POWDER, LYOPHILIZED, FOR SOLUTION INTRAMUSCULAR; INTRAVENOUS at 09:12

## 2025-06-16 RX ADMIN — INSULIN GLARGINE 15 UNITS: 100 INJECTION, SOLUTION SUBCUTANEOUS at 21:20

## 2025-06-16 RX ADMIN — PROPOFOL 110 MG: 10 INJECTION, EMULSION INTRAVENOUS at 14:41

## 2025-06-16 RX ADMIN — LANSOPRAZOLE 15 MG: 15 TABLET, ORALLY DISINTEGRATING, DELAYED RELEASE ORAL at 06:05

## 2025-06-16 RX ADMIN — POTASSIUM CHLORIDE: 2 INJECTION, SOLUTION, CONCENTRATE INTRAVENOUS at 01:58

## 2025-06-16 RX ADMIN — CYCLOBENZAPRINE 5 MG: 10 TABLET, FILM COATED ORAL at 09:24

## 2025-06-16 RX ADMIN — DEXTROSE: 5 SOLUTION INTRAVENOUS at 09:39

## 2025-06-16 RX ADMIN — SUGAMMADEX 200 MG: 100 INJECTION, SOLUTION INTRAVENOUS at 15:17

## 2025-06-16 RX ADMIN — ACETAMINOPHEN 650 MG: 325 TABLET ORAL at 09:24

## 2025-06-16 RX ADMIN — ONDANSETRON 4 MG: 2 INJECTION, SOLUTION INTRAMUSCULAR; INTRAVENOUS at 15:16

## 2025-06-16 ASSESSMENT — ENCOUNTER SYMPTOMS: SHORTNESS OF BREATH: 1

## 2025-06-16 ASSESSMENT — PAIN SCALES - WONG BAKER: WONGBAKER_NUMERICALRESPONSE: HURTS A LITTLE BIT

## 2025-06-16 ASSESSMENT — PAIN SCALES - GENERAL
PAINLEVEL_OUTOF10: 1
PAINLEVEL_OUTOF10: 0

## 2025-06-16 ASSESSMENT — PAIN - FUNCTIONAL ASSESSMENT: PAIN_FUNCTIONAL_ASSESSMENT: NONE - DENIES PAIN

## 2025-06-16 NOTE — TELEPHONE ENCOUNTER
----- Message from Dr. Agustin Malone, DO sent at 6/16/2025  2:34 PM EDT -----  Regarding: Follow up  Helena,    The hospitalist consulted Dr. Barertt for this patient and he recommended for me to just do a biopsy for possible sarcoidosis and I don't believe he was planning on seeing her inpatient.  Do you mind setting her up for a follow up in about a week with your office to go over the biopsy results and establish care for possible sarcoidosis please.  I did the biopsy today while she's inpatient. If ultimately he wants me to see this patient in follow up instead just text me and my office can take care of it.     Thanks!   Agustin

## 2025-06-16 NOTE — ANESTHESIA PRE PROCEDURE
Department of Anesthesiology  Preprocedure Note       Name:  Radha Yousif   Age:  44 y.o.  :  1980                                          MRN:  [id number]         Date:  2025      Surgeon: Surgeon(s):  Agustin Malone DO    Procedure: Procedure(s):  BRONCHOSCOPY W EBUS    Medications prior to admission:   Prior to Admission medications    Medication Sig Start Date End Date Taking? Authorizing Provider   acetaminophen (TYLENOL) 325 MG tablet Take 2 tablets by mouth every morning   Yes Katherine Walters MD   cyclobenzaprine (FLEXERIL) 5 MG tablet 1 tablet by Per G Tube route every 8 hours as needed (Pain) 25  Yes Katherine Walters MD   FLUoxetine (PROZAC) 20 MG capsule Take 2 capsules by mouth daily   Yes Katherine Walters MD   ferrous sulfate (IRON 325) 325 (65 Fe) MG tablet Take 1 tablet by mouth in the morning and 1 tablet in the evening.   Yes Katherine Walters MD   Latanoprost 0.005 % EMUL Apply 1 drop to eye nightly   Yes Katherine Walters MD   midodrine (PROAMATINE) 10 MG tablet Take 1 tablet by mouth 3 times daily 24  Yes Katherine Walters MD   Omega 3 1200 MG CAPS Take 1,200 mg by mouth 2 times daily   Yes Katherine Walters MD   CRANBERRY  mg by Per G Tube route 2 times daily   Yes Kahterine Walters MD   acetaminophen (TYLENOL) 500 MG tablet Take 1 tablet by mouth daily   Yes Katherine Walters MD   famotidine (PEPCID) 40 MG tablet 1 tablet by Per G Tube route daily @ bedtime   Yes Katherine Walters MD   fexofenadine (ALLEGRA) 180 MG tablet Take 1 tablet by mouth daily   Yes Katherine Walters MD   metoprolol tartrate (LOPRESSOR) 25 MG tablet 0.5 tablets by Per G Tube route 2 times daily Give 12.5 mg via G-Tube every morning and at bedtime 23  Yes Katherine Walters MD   MULTIPLE VITAMIN PO 15 mLs by Per G Tube route every morning   Yes Katherine Walters MD   pantoprazole (PROTONIX) 40 MG tablet Take 1 tablet by

## 2025-06-16 NOTE — TELEPHONE ENCOUNTER
Dr. Barrett,   I do not see that we consulted on patient. Would you like me to get her scheduled for a follow up in our clinic or follow up outpatient at [facility]?     Please let me know.     Thanks!

## 2025-06-17 PROBLEM — N28.9 ACUTE KIDNEY INSUFFICIENCY: Status: ACTIVE | Noted: 2025-06-17

## 2025-06-17 PROBLEM — E27.40 ADRENAL INSUFFICIENCY: Status: ACTIVE | Noted: 2025-06-17

## 2025-06-17 LAB
ANION GAP SERPL CALC-SCNC: 10 MEQ/L (ref 8–16)
ANION GAP SERPL CALC-SCNC: 11 MEQ/L (ref 8–16)
ANION GAP SERPL CALC-SCNC: 11 MEQ/L (ref 8–16)
ANION GAP SERPL CALC-SCNC: 12 MEQ/L (ref 8–16)
BAL CHARACTER: CLEAR
BAL COLLECTION SITE: ABNORMAL
BAL COLOR: COLORLESS
BUN SERPL-MCNC: 26 MG/DL (ref 8–23)
BUN SERPL-MCNC: 28 MG/DL (ref 8–23)
BUN SERPL-MCNC: 29 MG/DL (ref 8–23)
BUN SERPL-MCNC: 29 MG/DL (ref 8–23)
CALCIUM SERPL-MCNC: 8.3 MG/DL (ref 8.6–10)
CALCIUM SERPL-MCNC: 8.6 MG/DL (ref 8.6–10)
CALCIUM SERPL-MCNC: 9.1 MG/DL (ref 8.6–10)
CALCIUM SERPL-MCNC: 9.3 MG/DL (ref 8.6–10)
CHLORIDE SERPL-SCNC: 104 MEQ/L (ref 98–111)
CHLORIDE SERPL-SCNC: 108 MEQ/L (ref 98–111)
CHLORIDE SERPL-SCNC: 111 MEQ/L (ref 98–111)
CHLORIDE SERPL-SCNC: 111 MEQ/L (ref 98–111)
CO2 SERPL-SCNC: 17 MEQ/L (ref 22–29)
CO2 SERPL-SCNC: 18 MEQ/L (ref 22–29)
CO2 SERPL-SCNC: 19 MEQ/L (ref 22–29)
CO2 SERPL-SCNC: 21 MEQ/L (ref 22–29)
CREAT SERPL-MCNC: 1.3 MG/DL (ref 0.5–0.9)
CREAT SERPL-MCNC: 1.4 MG/DL (ref 0.5–0.9)
EKG ATRIAL RATE: 86 BPM
EKG P AXIS: 39 DEGREES
EKG P-R INTERVAL: 134 MS
EKG Q-T INTERVAL: 412 MS
EKG QRS DURATION: 72 MS
EKG QTC CALCULATION (BAZETT): 493 MS
EKG R AXIS: 59 DEGREES
EKG T AXIS: 71 DEGREES
EKG VENTRICULAR RATE: 86 BPM
GFR SERPL CREATININE-BSD FRML MDRD: 47 ML/MIN/1.73M2
GFR SERPL CREATININE-BSD FRML MDRD: 52 ML/MIN/1.73M2
GLUCOSE BLD STRIP.AUTO-MCNC: 241 MG/DL (ref 70–108)
GLUCOSE BLD STRIP.AUTO-MCNC: 292 MG/DL (ref 70–108)
GLUCOSE BLD STRIP.AUTO-MCNC: 295 MG/DL (ref 70–108)
GLUCOSE BLD STRIP.AUTO-MCNC: 340 MG/DL (ref 70–108)
GLUCOSE BLD STRIP.AUTO-MCNC: 380 MG/DL (ref 70–108)
GLUCOSE SERPL-MCNC: 215 MG/DL (ref 74–109)
GLUCOSE SERPL-MCNC: 299 MG/DL (ref 74–109)
GLUCOSE SERPL-MCNC: 303 MG/DL (ref 74–109)
GLUCOSE SERPL-MCNC: 399 MG/DL (ref 74–109)
LYMPHOCYTES NFR BRONCH MANUAL: 17 % (ref 10–15)
MACROPHAGE/MONOCYTE BAL: 27 % (ref 86–100)
NEUTROPHILS NFR BRONCH MANUAL: 56 % (ref 0–3)
OSMOLALITY UR: 192 MOSMOL/KG (ref 250–750)
OSMOLALITY UR: 402 MOSMOL/KG (ref 250–750)
OSMOLALITY UR: 506 MOSMOL/KG (ref 250–750)
OSMOLALITY UR: 99 MOSMOL/KG (ref 250–750)
PATHOLOGIST REVIEW: ABNORMAL
POTASSIUM SERPL-SCNC: 3.6 MEQ/L (ref 3.5–5.2)
POTASSIUM SERPL-SCNC: 3.8 MEQ/L (ref 3.5–5.2)
POTASSIUM SERPL-SCNC: 4.1 MEQ/L (ref 3.5–5.2)
POTASSIUM SERPL-SCNC: 4.5 MEQ/L (ref 3.5–5.2)
PROTEIN ELECTROPHORESIS, SERUM: NORMAL
PTH RELATED PROT SERPL-SCNC: 6 PMOL/L (ref 0–3.4)
RBC BAL: 63 /CUMM
SODIUM SERPL-SCNC: 134 MEQ/L (ref 135–145)
SODIUM SERPL-SCNC: 137 MEQ/L (ref 135–145)
SODIUM SERPL-SCNC: 139 MEQ/L (ref 135–145)
SODIUM SERPL-SCNC: 143 MEQ/L (ref 135–145)
TOTAL NUCLEATED CELLS BAL: 46 /CUMM
TOTAL VOLUME RECEIVED BAL: 45 ML

## 2025-06-17 PROCEDURE — 6370000000 HC RX 637 (ALT 250 FOR IP): Performed by: INTERNAL MEDICINE

## 2025-06-17 PROCEDURE — 6370000000 HC RX 637 (ALT 250 FOR IP)

## 2025-06-17 PROCEDURE — 93010 ELECTROCARDIOGRAM REPORT: CPT | Performed by: INTERNAL MEDICINE

## 2025-06-17 PROCEDURE — 99232 SBSQ HOSP IP/OBS MODERATE 35: CPT | Performed by: INTERNAL MEDICINE

## 2025-06-17 PROCEDURE — 6360000002 HC RX W HCPCS

## 2025-06-17 PROCEDURE — 93005 ELECTROCARDIOGRAM TRACING: CPT | Performed by: PHYSICIAN ASSISTANT

## 2025-06-17 PROCEDURE — 83935 ASSAY OF URINE OSMOLALITY: CPT

## 2025-06-17 PROCEDURE — 6360000002 HC RX W HCPCS: Performed by: PHYSICIAN ASSISTANT

## 2025-06-17 PROCEDURE — 97110 THERAPEUTIC EXERCISES: CPT

## 2025-06-17 PROCEDURE — 82948 REAGENT STRIP/BLOOD GLUCOSE: CPT

## 2025-06-17 PROCEDURE — 92610 EVALUATE SWALLOWING FUNCTION: CPT

## 2025-06-17 PROCEDURE — 2580000003 HC RX 258: Performed by: PHYSICIAN ASSISTANT

## 2025-06-17 PROCEDURE — 6370000000 HC RX 637 (ALT 250 FOR IP): Performed by: PHYSICIAN ASSISTANT

## 2025-06-17 PROCEDURE — 2500000003 HC RX 250 WO HCPCS: Performed by: PHYSICIAN ASSISTANT

## 2025-06-17 PROCEDURE — 6360000002 HC RX W HCPCS: Performed by: INTERNAL MEDICINE

## 2025-06-17 PROCEDURE — 1200000000 HC SEMI PRIVATE

## 2025-06-17 PROCEDURE — 80048 BASIC METABOLIC PNL TOTAL CA: CPT

## 2025-06-17 PROCEDURE — 84590 ASSAY OF VITAMIN A: CPT

## 2025-06-17 PROCEDURE — 99232 SBSQ HOSP IP/OBS MODERATE 35: CPT | Performed by: PHYSICIAN ASSISTANT

## 2025-06-17 PROCEDURE — 36415 COLL VENOUS BLD VENIPUNCTURE: CPT

## 2025-06-17 RX ORDER — DESMOPRESSIN ACETATE 4 UG/ML
2 INJECTION, SOLUTION INTRAVENOUS; SUBCUTANEOUS ONCE
Status: COMPLETED | OUTPATIENT
Start: 2025-06-17 | End: 2025-06-17

## 2025-06-17 RX ORDER — HYDROCORTISONE SODIUM SUCCINATE 100 MG/2ML
25 INJECTION INTRAMUSCULAR; INTRAVENOUS EVERY 12 HOURS
Status: DISCONTINUED | OUTPATIENT
Start: 2025-06-17 | End: 2025-06-23

## 2025-06-17 RX ADMIN — DESMOPRESSIN ACETATE 2 MCG: 4 INJECTION, SOLUTION INTRAVENOUS; SUBCUTANEOUS at 10:55

## 2025-06-17 RX ADMIN — 0.12% CHLORHEXIDINE GLUCONATE 15 ML: 1.2 RINSE ORAL at 09:31

## 2025-06-17 RX ADMIN — INSULIN LISPRO 12 UNITS: 100 INJECTION, SOLUTION INTRAVENOUS; SUBCUTANEOUS at 21:18

## 2025-06-17 RX ADMIN — LANSOPRAZOLE 15 MG: 15 TABLET, ORALLY DISINTEGRATING, DELAYED RELEASE ORAL at 06:06

## 2025-06-17 RX ADMIN — INSULIN GLARGINE 15 UNITS: 100 INJECTION, SOLUTION SUBCUTANEOUS at 21:17

## 2025-06-17 RX ADMIN — DEXTROSE: 5 SOLUTION INTRAVENOUS at 02:32

## 2025-06-17 RX ADMIN — MIDODRINE HYDROCHLORIDE 10 MG: 10 TABLET ORAL at 11:47

## 2025-06-17 RX ADMIN — HEPARIN SODIUM 5000 UNITS: 5000 INJECTION INTRAVENOUS; SUBCUTANEOUS at 17:05

## 2025-06-17 RX ADMIN — INSULIN LISPRO 8 UNITS: 100 INJECTION, SOLUTION INTRAVENOUS; SUBCUTANEOUS at 06:31

## 2025-06-17 RX ADMIN — HEPARIN SODIUM 5000 UNITS: 5000 INJECTION INTRAVENOUS; SUBCUTANEOUS at 09:30

## 2025-06-17 RX ADMIN — HYDROCORTISONE SODIUM SUCCINATE 25 MG: 100 INJECTION, POWDER, FOR SOLUTION INTRAMUSCULAR; INTRAVENOUS at 17:05

## 2025-06-17 RX ADMIN — SODIUM CHLORIDE, PRESERVATIVE FREE 10 ML: 5 INJECTION INTRAVENOUS at 21:35

## 2025-06-17 RX ADMIN — HEPARIN SODIUM 5000 UNITS: 5000 INJECTION INTRAVENOUS; SUBCUTANEOUS at 02:30

## 2025-06-17 RX ADMIN — INSULIN LISPRO 8 UNITS: 100 INJECTION, SOLUTION INTRAVENOUS; SUBCUTANEOUS at 17:05

## 2025-06-17 RX ADMIN — MIDODRINE HYDROCHLORIDE 10 MG: 10 TABLET ORAL at 17:05

## 2025-06-17 RX ADMIN — MIDODRINE HYDROCHLORIDE 10 MG: 10 TABLET ORAL at 09:30

## 2025-06-17 RX ADMIN — Medication 6 MG: at 21:17

## 2025-06-17 RX ADMIN — 0.12% CHLORHEXIDINE GLUCONATE 15 ML: 1.2 RINSE ORAL at 21:17

## 2025-06-17 RX ADMIN — MIRTAZAPINE 7.5 MG: 7.5 TABLET, FILM COATED ORAL at 21:17

## 2025-06-17 RX ADMIN — FLUOXETINE HYDROCHLORIDE 40 MG: 20 CAPSULE ORAL at 09:30

## 2025-06-17 RX ADMIN — INSULIN LISPRO 4 UNITS: 100 INJECTION, SOLUTION INTRAVENOUS; SUBCUTANEOUS at 11:47

## 2025-06-17 NOTE — TELEPHONE ENCOUNTER
Message sent to Dr. Lal letting him know Dr. Barrett would like him to follow patient in his clinic. Waiting to see if there is anything he needs me to fax over for the patient to get scheduled for a hospital follow up.

## 2025-06-17 NOTE — CARE COORDINATION
6/17/25, 7:11 AM EDT    DISCHARGE ON GOING EVALUATION    Radha KRISHNA [facility] day: 5  Location: 6A-15/015-A Reason for admit: Acute renal failure [N17.9]  Liver cancer (HCC) [C22.9]  Hypercalcemia [E83.52]  ARF (acute renal failure) [N17.9]     Procedures: 6/16/25  Flexible fiberoptic bronchoscopy, diagnostic BAL of left lower lobe, transbronchial biopsy of left lower lobe, EBUS with biopsy of 3 lymph node stations       Imaging since last note: 6/15/255 CT chest  IMPRESSION:  1. Innumerable nodular densities are seen throughout the mid and lower lung  fields. This could be related to an infectious process. Short-term follow-up  chest CT is recommended to ensure resolution.  2. Splenomegaly    Barriers to Discharge: Creat 1.4. Na+ 139. K+ 4.5. Ca+ 8.3. No HD at present. Nephro cont to follow. Bronch yesterday. Imaging reveals nodule densities mid and lower lungs. Pulmonology following. PT/OT/ST.    PCP: Miriam Coppola MD  Readmission Risk Score: 15.1    Patient Goals/Plan/Treatment Preferences: From [facility]. SW following.

## 2025-06-17 NOTE — ANESTHESIA POSTPROCEDURE EVALUATION
Department of Anesthesiology  Postprocedure Note    Patient: Radha Yousif  MRN: [id number]  YOB: 1980  Date of evaluation: 6/17/2025    Procedure Summary       Date: 06/16/25 Room / Location: [place] [address] / [facility]    Anesthesia Start: 1439 Anesthesia Stop: 1530    Procedures:       BRONCHOSCOPY ALVEOLAR LAVAGE      BRONCHOSCOPY ENDOBRONCHIAL ULTRASOUND FINE NEEDLE ASPIRATION Diagnosis:       Acute kidney injury      (Acute kidney injury [N17.9])    Surgeons: Agustin Malone DO Responsible Provider: Nicholas Corbin DO    Anesthesia Type: general ASA Status: 3            Anesthesia Type: No value filed.    Ciro Phase I: Ciro Score: 7    Ciro Phase II:      Anesthesia Post Evaluation    Patient location during evaluation: PACU  Patient participation: complete - patient participated  Level of consciousness: awake and alert  Airway patency: patent  Nausea & Vomiting: no vomiting and no nausea  Cardiovascular status: hemodynamically stable  Respiratory status: acceptable  Hydration status: stable  Pain management: adequate    No notable events documented.

## 2025-06-18 ENCOUNTER — APPOINTMENT (OUTPATIENT)
Dept: GENERAL RADIOLOGY | Age: 45
DRG: 987 | End: 2025-06-18
Attending: INTERNAL MEDICINE
Payer: COMMERCIAL

## 2025-06-18 ENCOUNTER — APPOINTMENT (OUTPATIENT)
Dept: MRI IMAGING | Age: 45
DRG: 987 | End: 2025-06-18
Attending: INTERNAL MEDICINE
Payer: COMMERCIAL

## 2025-06-18 PROBLEM — E11.65 TYPE 2 DIABETES MELLITUS WITH HYPERGLYCEMIA, WITH LONG-TERM CURRENT USE OF INSULIN (HCC): Status: ACTIVE | Noted: 2025-06-18

## 2025-06-18 PROBLEM — Z79.4 TYPE 2 DIABETES MELLITUS WITH HYPERGLYCEMIA, WITH LONG-TERM CURRENT USE OF INSULIN (HCC): Status: ACTIVE | Noted: 2025-06-18

## 2025-06-18 LAB
ANION GAP SERPL CALC-SCNC: 11 MEQ/L (ref 8–16)
ANION GAP SERPL CALC-SCNC: 7 MEQ/L (ref 8–16)
ANION GAP SERPL CALC-SCNC: 9 MEQ/L (ref 8–16)
BACTERIA SPEC RESP CULT: NORMAL
BUN SERPL-MCNC: 25 MG/DL (ref 8–23)
BUN SERPL-MCNC: 25 MG/DL (ref 8–23)
BUN SERPL-MCNC: 27 MG/DL (ref 8–23)
CALCIUM SERPL-MCNC: 8.5 MG/DL (ref 8.6–10)
CALCIUM SERPL-MCNC: 8.7 MG/DL (ref 8.6–10)
CALCIUM SERPL-MCNC: 8.7 MG/DL (ref 8.6–10)
CHLORIDE SERPL-SCNC: 100 MEQ/L (ref 98–111)
CHLORIDE SERPL-SCNC: 100 MEQ/L (ref 98–111)
CHLORIDE SERPL-SCNC: 103 MEQ/L (ref 98–111)
CO2 SERPL-SCNC: 18 MEQ/L (ref 22–29)
CO2 SERPL-SCNC: 20 MEQ/L (ref 22–29)
CO2 SERPL-SCNC: 21 MEQ/L (ref 22–29)
CREAT SERPL-MCNC: 1.1 MG/DL (ref 0.5–0.9)
CREAT SERPL-MCNC: 1.2 MG/DL (ref 0.5–0.9)
CREAT SERPL-MCNC: 1.2 MG/DL (ref 0.5–0.9)
GFR SERPL CREATININE-BSD FRML MDRD: 57 ML/MIN/1.73M2
GFR SERPL CREATININE-BSD FRML MDRD: 57 ML/MIN/1.73M2
GFR SERPL CREATININE-BSD FRML MDRD: 63 ML/MIN/1.73M2
GLUCOSE BLD STRIP.AUTO-MCNC: 133 MG/DL (ref 70–108)
GLUCOSE BLD STRIP.AUTO-MCNC: 164 MG/DL (ref 70–108)
GLUCOSE BLD STRIP.AUTO-MCNC: 440 MG/DL (ref 70–108)
GLUCOSE BLD STRIP.AUTO-MCNC: 472 MG/DL (ref 70–108)
GLUCOSE BLD STRIP.AUTO-MCNC: 486 MG/DL (ref 70–108)
GLUCOSE BLD STRIP.AUTO-MCNC: 85 MG/DL (ref 70–108)
GLUCOSE BLD STRIP.AUTO-MCNC: 95 MG/DL (ref 70–108)
GLUCOSE SERPL-MCNC: 137 MG/DL (ref 74–109)
GLUCOSE SERPL-MCNC: 71 MG/DL (ref 74–109)
GLUCOSE SERPL-MCNC: 87 MG/DL (ref 74–109)
GRAM STN SPEC: NORMAL
POTASSIUM SERPL-SCNC: 3.5 MEQ/L (ref 3.5–5.2)
POTASSIUM SERPL-SCNC: 3.7 MEQ/L (ref 3.5–5.2)
POTASSIUM SERPL-SCNC: 3.9 MEQ/L (ref 3.5–5.2)
SODIUM SERPL-SCNC: 129 MEQ/L (ref 135–145)
SODIUM SERPL-SCNC: 129 MEQ/L (ref 135–145)
SODIUM SERPL-SCNC: 131 MEQ/L (ref 135–145)

## 2025-06-18 PROCEDURE — 80048 BASIC METABOLIC PNL TOTAL CA: CPT

## 2025-06-18 PROCEDURE — 99232 SBSQ HOSP IP/OBS MODERATE 35: CPT | Performed by: INTERNAL MEDICINE

## 2025-06-18 PROCEDURE — 49465 FLUORO EXAM OF G/COLON TUBE: CPT

## 2025-06-18 PROCEDURE — 6370000000 HC RX 637 (ALT 250 FOR IP): Performed by: INTERNAL MEDICINE

## 2025-06-18 PROCEDURE — 6370000000 HC RX 637 (ALT 250 FOR IP): Performed by: PHYSICIAN ASSISTANT

## 2025-06-18 PROCEDURE — 97535 SELF CARE MNGMENT TRAINING: CPT

## 2025-06-18 PROCEDURE — 6360000002 HC RX W HCPCS: Performed by: PHYSICIAN ASSISTANT

## 2025-06-18 PROCEDURE — 6370000000 HC RX 637 (ALT 250 FOR IP)

## 2025-06-18 PROCEDURE — 70553 MRI BRAIN STEM W/O & W/DYE: CPT

## 2025-06-18 PROCEDURE — 6360000004 HC RX CONTRAST MEDICATION: Performed by: PHYSICIAN ASSISTANT

## 2025-06-18 PROCEDURE — 2500000003 HC RX 250 WO HCPCS

## 2025-06-18 PROCEDURE — 2580000003 HC RX 258

## 2025-06-18 PROCEDURE — A9579 GAD-BASE MR CONTRAST NOS,1ML: HCPCS | Performed by: PHYSICIAN ASSISTANT

## 2025-06-18 PROCEDURE — 97530 THERAPEUTIC ACTIVITIES: CPT

## 2025-06-18 PROCEDURE — 2500000003 HC RX 250 WO HCPCS: Performed by: PHYSICIAN ASSISTANT

## 2025-06-18 PROCEDURE — 6360000002 HC RX W HCPCS

## 2025-06-18 PROCEDURE — 93005 ELECTROCARDIOGRAM TRACING: CPT | Performed by: PHYSICIAN ASSISTANT

## 2025-06-18 PROCEDURE — 2000000000 HC ICU R&B

## 2025-06-18 PROCEDURE — 36415 COLL VENOUS BLD VENIPUNCTURE: CPT

## 2025-06-18 PROCEDURE — 82948 REAGENT STRIP/BLOOD GLUCOSE: CPT

## 2025-06-18 RX ORDER — NOREPINEPHRINE BITARTRATE 0.06 MG/ML
1-100 INJECTION, SOLUTION INTRAVENOUS CONTINUOUS
Status: DISCONTINUED | OUTPATIENT
Start: 2025-06-18 | End: 2025-06-22

## 2025-06-18 RX ORDER — INSULIN LISPRO 100 [IU]/ML
0-8 INJECTION, SOLUTION INTRAVENOUS; SUBCUTANEOUS
Status: DISCONTINUED | OUTPATIENT
Start: 2025-06-18 | End: 2025-06-30 | Stop reason: HOSPADM

## 2025-06-18 RX ORDER — SODIUM CHLORIDE, SODIUM LACTATE, POTASSIUM CHLORIDE, AND CALCIUM CHLORIDE .6; .31; .03; .02 G/100ML; G/100ML; G/100ML; G/100ML
1000 INJECTION, SOLUTION INTRAVENOUS ONCE
Status: COMPLETED | OUTPATIENT
Start: 2025-06-18 | End: 2025-06-19

## 2025-06-18 RX ORDER — GADOTERIDOL 279.3 MG/ML
10 INJECTION INTRAVENOUS
Status: COMPLETED | OUTPATIENT
Start: 2025-06-18 | End: 2025-06-18

## 2025-06-18 RX ORDER — DIATRIZOATE MEGLUMINE AND DIATRIZOATE SODIUM 660; 100 MG/ML; MG/ML
30 SOLUTION ORAL; RECTAL
Status: DISCONTINUED | OUTPATIENT
Start: 2025-06-18 | End: 2025-06-23 | Stop reason: SDUPTHER

## 2025-06-18 RX ORDER — NOREPINEPHRINE BITARTRATE 0.06 MG/ML
INJECTION, SOLUTION INTRAVENOUS
Status: COMPLETED
Start: 2025-06-18 | End: 2025-06-18

## 2025-06-18 RX ADMIN — SODIUM CHLORIDE, PRESERVATIVE FREE 10 ML: 5 INJECTION INTRAVENOUS at 21:13

## 2025-06-18 RX ADMIN — HEPARIN SODIUM 5000 UNITS: 5000 INJECTION INTRAVENOUS; SUBCUTANEOUS at 02:08

## 2025-06-18 RX ADMIN — ACETAMINOPHEN 650 MG: 650 SUPPOSITORY RECTAL at 14:41

## 2025-06-18 RX ADMIN — LANSOPRAZOLE 15 MG: 15 TABLET, ORALLY DISINTEGRATING, DELAYED RELEASE ORAL at 06:36

## 2025-06-18 RX ADMIN — Medication 5 MCG/MIN: at 23:34

## 2025-06-18 RX ADMIN — FLUOXETINE HYDROCHLORIDE 40 MG: 20 CAPSULE ORAL at 08:59

## 2025-06-18 RX ADMIN — HYDROCORTISONE SODIUM SUCCINATE 25 MG: 100 INJECTION, POWDER, FOR SOLUTION INTRAMUSCULAR; INTRAVENOUS at 06:36

## 2025-06-18 RX ADMIN — MIDODRINE HYDROCHLORIDE 10 MG: 10 TABLET ORAL at 08:59

## 2025-06-18 RX ADMIN — INSULIN GLARGINE 15 UNITS: 100 INJECTION, SOLUTION SUBCUTANEOUS at 21:03

## 2025-06-18 RX ADMIN — MIRTAZAPINE 7.5 MG: 7.5 TABLET, FILM COATED ORAL at 20:54

## 2025-06-18 RX ADMIN — SODIUM CHLORIDE, SODIUM LACTATE, POTASSIUM CHLORIDE, AND CALCIUM CHLORIDE 1000 ML: .6; .31; .03; .02 INJECTION, SOLUTION INTRAVENOUS at 23:17

## 2025-06-18 RX ADMIN — HEPARIN SODIUM 5000 UNITS: 5000 INJECTION INTRAVENOUS; SUBCUTANEOUS at 16:34

## 2025-06-18 RX ADMIN — MIDODRINE HYDROCHLORIDE 10 MG: 10 TABLET ORAL at 16:34

## 2025-06-18 RX ADMIN — DIATRIZOATE MEGLUMINE AND DIATRIZOATE SODIUM 30 ML: 600; 100 SOLUTION ORAL; RECTAL at 13:16

## 2025-06-18 RX ADMIN — HYDROCORTISONE SODIUM SUCCINATE 25 MG: 100 INJECTION, POWDER, FOR SOLUTION INTRAMUSCULAR; INTRAVENOUS at 16:34

## 2025-06-18 RX ADMIN — GADOTERIDOL 10 ML: 279.3 INJECTION, SOLUTION INTRAVENOUS at 10:57

## 2025-06-18 RX ADMIN — SODIUM CHLORIDE, PRESERVATIVE FREE 10 ML: 5 INJECTION INTRAVENOUS at 09:00

## 2025-06-18 RX ADMIN — INSULIN LISPRO 8 UNITS: 100 INJECTION, SOLUTION INTRAVENOUS; SUBCUTANEOUS at 21:04

## 2025-06-18 RX ADMIN — DIATRIZOATE MEGLUMINE AND DIATRIZOATE SODIUM 30 ML: 600; 100 SOLUTION ORAL; RECTAL at 15:06

## 2025-06-18 RX ADMIN — HEPARIN SODIUM 5000 UNITS: 5000 INJECTION INTRAVENOUS; SUBCUTANEOUS at 08:59

## 2025-06-18 RX ADMIN — 0.12% CHLORHEXIDINE GLUCONATE 15 ML: 1.2 RINSE ORAL at 08:58

## 2025-06-18 ASSESSMENT — PAIN SCALES - GENERAL
PAINLEVEL_OUTOF10: 4
PAINLEVEL_OUTOF10: 3
PAINLEVEL_OUTOF10: 0

## 2025-06-18 ASSESSMENT — PAIN DESCRIPTION - LOCATION: LOCATION: ABDOMEN

## 2025-06-18 ASSESSMENT — PAIN DESCRIPTION - ORIENTATION: ORIENTATION: MID

## 2025-06-18 ASSESSMENT — PAIN DESCRIPTION - DESCRIPTORS: DESCRIPTORS: ACHING

## 2025-06-18 NOTE — TELEPHONE ENCOUNTER
Agustin Malone DO Dethlefsen, Kayla, LPN  Nope I got it, my office is going to get her setup and I'll put in her dc info and make sure family notified.    Thanks!          Previous Messages       ----- Message -----  From: Helena Chua LPN  Sent: 6/17/2025  10:25 AM EDT  To: Agustin Malone DO  Subject: FW: Follow up                                    Good Morning Dr. Malone,  I reached out to Dr. Barrett and he would like you to follow patient in your clinic if possible. Do you need me to fax anything to your office to get her a hospital follow up scheduled?  Thanks!

## 2025-06-18 NOTE — CARE COORDINATION
6/18/25, 3:21 PM EDT    DISCHARGE ON GOING EVALUATION    Radha KRISHNA [facility] day: 6  Location: 6A-15/015-A Reason for admit: Acute renal failure [N17.9]  Liver cancer (HCC) [C22.9]  Hypercalcemia [E83.52]  ARF (acute renal failure) [N17.9]     Procedures:    6/16/25  Flexible fiberoptic bronchoscopy, diagnostic BAL of left lower lobe, transbronchial biopsy of left lower lobe, EBUS with biopsy of 3 lymph node stations     Imaging since last note: none    Barriers to Discharge: Hospitalist, pulm, nephro, endo following. New neuro consult for concern for neurosarcoidosis. IV solucortef. MRI brain planned. Na+ 129, Creatinine 1.2.     PCP: Miriam Coppola MD  Readmission Risk Score: 14.6    Patient Goals/Plan/Treatment Preferences: Plans to return to [facility] at discharge.

## 2025-06-18 NOTE — CARE COORDINATION
6/18/25, 2:52 PM EDT    DISCHARGE PLANNING EVALUATION    Planning return to [facility] at discharge

## 2025-06-19 ENCOUNTER — APPOINTMENT (OUTPATIENT)
Dept: GENERAL RADIOLOGY | Age: 45
DRG: 987 | End: 2025-06-19
Attending: INTERNAL MEDICINE
Payer: COMMERCIAL

## 2025-06-19 ENCOUNTER — APPOINTMENT (OUTPATIENT)
Dept: CT IMAGING | Age: 45
DRG: 987 | End: 2025-06-19
Attending: INTERNAL MEDICINE
Payer: COMMERCIAL

## 2025-06-19 PROBLEM — I95.9 HYPOTENSION: Status: ACTIVE | Noted: 2025-06-19

## 2025-06-19 LAB
ABO GROUP BLD: NORMAL
ALBUMIN SERPL BCG-MCNC: 3 G/DL (ref 3.4–4.9)
ALP SERPL-CCNC: 127 U/L (ref 38–126)
ALT SERPL W/O P-5'-P-CCNC: 19 U/L (ref 10–35)
ANION GAP SERPL CALC-SCNC: 10 MEQ/L (ref 8–16)
ANION GAP SERPL CALC-SCNC: 10 MEQ/L (ref 8–16)
ANION GAP SERPL CALC-SCNC: 12 MEQ/L (ref 8–16)
ANION GAP SERPL CALC-SCNC: 15 MEQ/L (ref 8–16)
AST SERPL-CCNC: 28 U/L (ref 10–35)
B-OH-BUTYR SERPL-MSCNC: 1.35 MG/DL (ref 0.2–2.81)
B-OH-BUTYR SERPL-MSCNC: 3.32 MG/DL (ref 0.2–2.81)
B2 MICROGLOB SERPL-MCNC: 5.8 MG/L (ref 0.8–2.4)
BACTERIA: ABNORMAL
BASE EXCESS BLDA CALC-SCNC: -10.9 MMOL/L (ref -2–3)
BASOPHILS ABSOLUTE: 0.1 THOU/MM3 (ref 0–0.1)
BASOPHILS NFR BLD AUTO: 0.2 %
BILIRUB CONJ SERPL-MCNC: < 0.1 MG/DL (ref 0–0.2)
BILIRUB SERPL-MCNC: < 0.2 MG/DL (ref 0.3–1.2)
BILIRUB UR QL STRIP: NEGATIVE
BUN SERPL-MCNC: 27 MG/DL (ref 8–23)
BUN SERPL-MCNC: 31 MG/DL (ref 8–23)
BUN SERPL-MCNC: 32 MG/DL (ref 8–23)
BUN SERPL-MCNC: 35 MG/DL (ref 8–23)
BURR CELLS BLD QL SMEAR: ABNORMAL
CA-I BLD ISE-SCNC: 1.2 MMOL/L (ref 1.12–1.32)
CA-I BLD ISE-SCNC: 1.23 MMOL/L (ref 1.12–1.32)
CALCIUM SERPL-MCNC: 7.5 MG/DL (ref 8.6–10)
CALCIUM SERPL-MCNC: 8 MG/DL (ref 8.6–10)
CALCIUM SERPL-MCNC: 8.4 MG/DL (ref 8.6–10)
CALCIUM SERPL-MCNC: 8.6 MG/DL (ref 8.6–10)
CASTS #/AREA URNS LPF: ABNORMAL /LPF
CASTS #/AREA URNS LPF: ABNORMAL /LPF
CHARACTER UR: ABNORMAL
CHLORIDE SERPL-SCNC: 103 MEQ/L (ref 98–111)
CHLORIDE SERPL-SCNC: 104 MEQ/L (ref 98–111)
CHLORIDE SERPL-SCNC: 105 MEQ/L (ref 98–111)
CHLORIDE SERPL-SCNC: 97 MEQ/L (ref 98–111)
CO2 SERPL-SCNC: 15 MEQ/L (ref 22–29)
CO2 SERPL-SCNC: 17 MEQ/L (ref 22–29)
CO2 SERPL-SCNC: 18 MEQ/L (ref 22–29)
CO2 SERPL-SCNC: 18 MEQ/L (ref 22–29)
COLLECTED BY:: ABNORMAL
COLOR UR: YELLOW
CREAT SERPL-MCNC: 1.4 MG/DL (ref 0.5–0.9)
CREAT SERPL-MCNC: 1.5 MG/DL (ref 0.5–0.9)
CREAT SERPL-MCNC: 1.5 MG/DL (ref 0.5–0.9)
CREAT SERPL-MCNC: 1.7 MG/DL (ref 0.5–0.9)
CRYSTALS URNS QL MICRO: ABNORMAL
DEPRECATED RDW RBC AUTO: 47.3 FL (ref 35–45)
DEPRECATED RDW RBC AUTO: 50.8 FL (ref 35–45)
DEVICE: ABNORMAL
EKG ATRIAL RATE: 83 BPM
EKG ATRIAL RATE: 96 BPM
EKG P AXIS: 46 DEGREES
EKG P AXIS: 61 DEGREES
EKG P-R INTERVAL: 108 MS
EKG P-R INTERVAL: 126 MS
EKG Q-T INTERVAL: 402 MS
EKG Q-T INTERVAL: 416 MS
EKG QRS DURATION: 72 MS
EKG QRS DURATION: 76 MS
EKG QTC CALCULATION (BAZETT): 488 MS
EKG QTC CALCULATION (BAZETT): 507 MS
EKG R AXIS: 66 DEGREES
EKG R AXIS: 67 DEGREES
EKG T AXIS: 130 DEGREES
EKG T AXIS: 68 DEGREES
EKG VENTRICULAR RATE: 83 BPM
EKG VENTRICULAR RATE: 96 BPM
EOSINOPHIL NFR BLD AUTO: 0.6 %
EOSINOPHILS ABSOLUTE: 0.2 THOU/MM3 (ref 0–0.4)
EPITHELIAL CELLS, UA: ABNORMAL /HPF
ERYTHROCYTE [DISTWIDTH] IN BLOOD BY AUTOMATED COUNT: 14.8 % (ref 11.5–14.5)
ERYTHROCYTE [DISTWIDTH] IN BLOOD BY AUTOMATED COUNT: 15.8 % (ref 11.5–14.5)
GFR SERPL CREATININE-BSD FRML MDRD: 37 ML/MIN/1.73M2
GFR SERPL CREATININE-BSD FRML MDRD: 44 ML/MIN/1.73M2
GFR SERPL CREATININE-BSD FRML MDRD: 44 ML/MIN/1.73M2
GFR SERPL CREATININE-BSD FRML MDRD: 47 ML/MIN/1.73M2
GLUCOSE BLD STRIP.AUTO-MCNC: 122 MG/DL (ref 70–108)
GLUCOSE BLD STRIP.AUTO-MCNC: 125 MG/DL (ref 70–108)
GLUCOSE BLD STRIP.AUTO-MCNC: 129 MG/DL (ref 70–108)
GLUCOSE BLD STRIP.AUTO-MCNC: 138 MG/DL (ref 70–108)
GLUCOSE BLD STRIP.AUTO-MCNC: 144 MG/DL (ref 70–108)
GLUCOSE BLD STRIP.AUTO-MCNC: 155 MG/DL (ref 70–108)
GLUCOSE BLD STRIP.AUTO-MCNC: 160 MG/DL (ref 70–108)
GLUCOSE BLD STRIP.AUTO-MCNC: 161 MG/DL (ref 70–108)
GLUCOSE BLD STRIP.AUTO-MCNC: 163 MG/DL (ref 70–108)
GLUCOSE BLD STRIP.AUTO-MCNC: 167 MG/DL (ref 70–108)
GLUCOSE BLD STRIP.AUTO-MCNC: 168 MG/DL (ref 70–108)
GLUCOSE BLD STRIP.AUTO-MCNC: 169 MG/DL (ref 70–108)
GLUCOSE BLD STRIP.AUTO-MCNC: 171 MG/DL (ref 70–108)
GLUCOSE BLD STRIP.AUTO-MCNC: 176 MG/DL (ref 70–108)
GLUCOSE BLD STRIP.AUTO-MCNC: 186 MG/DL (ref 70–108)
GLUCOSE BLD STRIP.AUTO-MCNC: 199 MG/DL (ref 70–108)
GLUCOSE BLD STRIP.AUTO-MCNC: 274 MG/DL (ref 70–108)
GLUCOSE BLD STRIP.AUTO-MCNC: 366 MG/DL (ref 70–108)
GLUCOSE BLD STRIP.AUTO-MCNC: 401 MG/DL (ref 70–108)
GLUCOSE BLD STRIP.AUTO-MCNC: 417 MG/DL (ref 70–108)
GLUCOSE BLD STRIP.AUTO-MCNC: 434 MG/DL (ref 70–108)
GLUCOSE FLD-MCNC: 168 MG/DL
GLUCOSE SERPL-MCNC: 143 MG/DL (ref 74–109)
GLUCOSE SERPL-MCNC: 151 MG/DL (ref 74–109)
GLUCOSE SERPL-MCNC: 181 MG/DL (ref 74–109)
GLUCOSE SERPL-MCNC: 405 MG/DL (ref 74–109)
GLUCOSE UR QL STRIP.AUTO: 500 MG/DL
HCO3 BLDA-SCNC: 15 MMOL/L (ref 23–28)
HCT VFR BLD AUTO: 16 % (ref 37–47)
HCT VFR BLD AUTO: 21.2 % (ref 37–47)
HCT VFR BLD AUTO: 24.5 % (ref 37–47)
HCT VFR BLD AUTO: 29 % (ref 37–47)
HEMOCCULT STL QL: NEGATIVE
HGB BLD-MCNC: 5.1 GM/DL (ref 12–16)
HGB BLD-MCNC: 7.1 GM/DL (ref 12–16)
HGB BLD-MCNC: 8.3 GM/DL (ref 12–16)
HGB BLD-MCNC: 9.7 GM/DL (ref 12–16)
HGB UR QL STRIP.AUTO: NEGATIVE
IAT IGG-SP REAG SERPL QL: NORMAL
IGA SERPL-MCNC: 220 MG/DL (ref 70–400)
IGG SERPL-MCNC: 1098 MG/DL (ref 700–1600)
IGM SERPL-MCNC: 71 MG/DL (ref 40–230)
IMM GRANULOCYTES # BLD AUTO: 2.02 THOU/MM3 (ref 0–0.07)
IMM GRANULOCYTES NFR BLD AUTO: 7.8 %
INR PPP: 1.1 (ref 0.85–1.13)
KETONES UR QL STRIP.AUTO: NEGATIVE
LACTATE SERPL-SCNC: 3.1 MMOL/L (ref 0.5–2.2)
LACTATE SERPL-SCNC: 6.2 MMOL/L (ref 0.5–2.2)
LDH FLD L TO P-CCNC: 237 U/L
LDH SERPL L TO P-CCNC: 525 U/L (ref 135–214)
LEUKEMIA/LYMPHOMA PHENOTYPING MISC: NORMAL
LEUKOCYTE ESTERASE UR QL STRIP.AUTO: ABNORMAL
LYMPHOCYTES ABSOLUTE: 1.6 THOU/MM3 (ref 1–4.8)
LYMPHOCYTES NFR BLD AUTO: 6.3 %
MCH RBC QN AUTO: 29.9 PG (ref 26–33)
MCH RBC QN AUTO: 30 PG (ref 26–33)
MCHC RBC AUTO-ENTMCNC: 31.9 GM/DL (ref 32.2–35.5)
MCHC RBC AUTO-ENTMCNC: 33.4 GM/DL (ref 32.2–35.5)
MCV RBC AUTO: 89.5 FL (ref 81–99)
MCV RBC AUTO: 94.1 FL (ref 81–99)
MONOCYTES ABSOLUTE: 3.3 THOU/MM3 (ref 0.4–1.3)
MONOCYTES NFR BLD AUTO: 12.8 %
MUCOUS THREADS URNS QL MICRO: ABNORMAL
NEUTROPHILS ABSOLUTE: 18.7 THOU/MM3 (ref 1.8–7.7)
NEUTROPHILS NFR BLD AUTO: 72.3 %
NITRITE UR QL STRIP.AUTO: NEGATIVE
NRBC BLD AUTO-RTO: 2 /100 WBC
PATHOLOGIST REVIEW: ABNORMAL
PATHOLOGIST REVIEW: ABNORMAL
PCO2 TEMP ADJ BLDMV: 34 MMHG (ref 41–51)
PH BLDMV: 7.26 [PH] (ref 7.31–7.41)
PH UR STRIP.AUTO: 6 [PH] (ref 5–9)
PLATELET # BLD AUTO: 123 THOU/MM3 (ref 130–400)
PLATELET # BLD AUTO: 292 THOU/MM3 (ref 130–400)
PLATELET BLD QL SMEAR: ABNORMAL
PMV BLD AUTO: 11.5 FL (ref 9.4–12.4)
PMV BLD AUTO: 12 FL (ref 9.4–12.4)
PO2 BLDMV: 20 MMHG (ref 25–40)
POLYCHROMASIA BLD QL SMEAR: ABNORMAL
POTASSIUM SERPL-SCNC: 4 MEQ/L (ref 3.5–5.2)
POTASSIUM SERPL-SCNC: 4.4 MEQ/L (ref 3.5–5.2)
POTASSIUM SERPL-SCNC: 4.4 MEQ/L (ref 3.5–5.2)
POTASSIUM SERPL-SCNC: 4.6 MEQ/L (ref 3.5–5.2)
PROT FLD-MCNC: 4.8 GM/DL
PROT SERPL-MCNC: 5.3 G/DL (ref 6.4–8.3)
PROT UR STRIP.AUTO-MCNC: ABNORMAL MG/DL
PROTHROMBIN TIME: 12.6 SECONDS (ref 10–13.5)
RBC # BLD AUTO: 1.7 MILL/MM3 (ref 4.2–5.4)
RBC # BLD AUTO: 3.24 MILL/MM3 (ref 4.2–5.4)
RBC #/AREA URNS HPF: ABNORMAL /HPF
REASON FOR REJECTION: NORMAL
REASON FOR REJECTION: NORMAL
REJECTED TEST: NORMAL
REJECTED TEST: NORMAL
RENAL EPI CELLS #/AREA URNS HPF: ABNORMAL /[HPF]
RH BLD: NORMAL
SAO2 % BLDMV: 25 %
SCAN OF BLOOD SMEAR: NORMAL
SITE: ABNORMAL
SODIUM SERPL-SCNC: 127 MEQ/L (ref 135–145)
SODIUM SERPL-SCNC: 132 MEQ/L (ref 135–145)
SODIUM SERPL-SCNC: 132 MEQ/L (ref 135–145)
SODIUM SERPL-SCNC: 133 MEQ/L (ref 135–145)
SP GR UR REFRACT.AUTO: 1.02 (ref 1–1.03)
T4 FREE SERPL-MCNC: 0.4 NG/DL (ref 0.92–1.68)
TSH SERPL DL<=0.05 MIU/L-ACNC: 2.66 UIU/ML (ref 0.27–4.2)
UROBILINOGEN UR QL STRIP.AUTO: 0.2 EU/DL (ref 0–1)
VANCOMYCIN SERPL-MCNC: 13 UG/ML (ref 10–39.9)
VENTILATION MODE VENT: ABNORMAL
WBC # BLD AUTO: 25.8 THOU/MM3 (ref 4.8–10.8)
WBC # BLD AUTO: 33.4 THOU/MM3 (ref 4.8–10.8)
WBC #/AREA URNS HPF: ABNORMAL /HPF

## 2025-06-19 PROCEDURE — 87081 CULTURE SCREEN ONLY: CPT

## 2025-06-19 PROCEDURE — 6370000000 HC RX 637 (ALT 250 FOR IP)

## 2025-06-19 PROCEDURE — 0W9B30Z DRAINAGE OF LEFT PLEURAL CAVITY WITH DRAINAGE DEVICE, PERCUTANEOUS APPROACH: ICD-10-PCS | Performed by: INTERNAL MEDICINE

## 2025-06-19 PROCEDURE — P9040 RBC LEUKOREDUCED IRRADIATED: HCPCS

## 2025-06-19 PROCEDURE — 82232 ASSAY OF BETA-2 PROTEIN: CPT

## 2025-06-19 PROCEDURE — 93010 ELECTROCARDIOGRAM REPORT: CPT | Performed by: INTERNAL MEDICINE

## 2025-06-19 PROCEDURE — 81001 URINALYSIS AUTO W/SCOPE: CPT

## 2025-06-19 PROCEDURE — 32551 INSERTION OF CHEST TUBE: CPT | Performed by: INTERNAL MEDICINE

## 2025-06-19 PROCEDURE — 84439 ASSAY OF FREE THYROXINE: CPT

## 2025-06-19 PROCEDURE — 2500000003 HC RX 250 WO HCPCS

## 2025-06-19 PROCEDURE — 84157 ASSAY OF PROTEIN OTHER: CPT

## 2025-06-19 PROCEDURE — 82784 ASSAY IGA/IGD/IGG/IGM EACH: CPT

## 2025-06-19 PROCEDURE — 84155 ASSAY OF PROTEIN SERUM: CPT

## 2025-06-19 PROCEDURE — 83605 ASSAY OF LACTIC ACID: CPT

## 2025-06-19 PROCEDURE — 82948 REAGENT STRIP/BLOOD GLUCOSE: CPT

## 2025-06-19 PROCEDURE — 99232 SBSQ HOSP IP/OBS MODERATE 35: CPT | Performed by: INTERNAL MEDICINE

## 2025-06-19 PROCEDURE — 86923 COMPATIBILITY TEST ELECTRIC: CPT

## 2025-06-19 PROCEDURE — 85014 HEMATOCRIT: CPT

## 2025-06-19 PROCEDURE — 82248 BILIRUBIN DIRECT: CPT

## 2025-06-19 PROCEDURE — 88112 CYTOPATH CELL ENHANCE TECH: CPT

## 2025-06-19 PROCEDURE — 6360000002 HC RX W HCPCS: Performed by: PHYSICIAN ASSISTANT

## 2025-06-19 PROCEDURE — 86885 COOMBS TEST INDIRECT QUAL: CPT

## 2025-06-19 PROCEDURE — 2580000003 HC RX 258

## 2025-06-19 PROCEDURE — 84443 ASSAY THYROID STIM HORMONE: CPT

## 2025-06-19 PROCEDURE — 82330 ASSAY OF CALCIUM: CPT

## 2025-06-19 PROCEDURE — 99291 CRITICAL CARE FIRST HOUR: CPT | Performed by: INTERNAL MEDICINE

## 2025-06-19 PROCEDURE — 71045 X-RAY EXAM CHEST 1 VIEW: CPT

## 2025-06-19 PROCEDURE — 6360000002 HC RX W HCPCS

## 2025-06-19 PROCEDURE — 74176 CT ABD & PELVIS W/O CONTRAST: CPT

## 2025-06-19 PROCEDURE — 30233N1 TRANSFUSION OF NONAUTOLOGOUS RED BLOOD CELLS INTO PERIPHERAL VEIN, PERCUTANEOUS APPROACH: ICD-10-PCS | Performed by: INTERNAL MEDICINE

## 2025-06-19 PROCEDURE — 86900 BLOOD TYPING SEROLOGIC ABO: CPT

## 2025-06-19 PROCEDURE — 2500000003 HC RX 250 WO HCPCS: Performed by: PHYSICIAN ASSISTANT

## 2025-06-19 PROCEDURE — 89051 BODY FLUID CELL COUNT: CPT

## 2025-06-19 PROCEDURE — 86901 BLOOD TYPING SEROLOGIC RH(D): CPT

## 2025-06-19 PROCEDURE — 36415 COLL VENOUS BLD VENIPUNCTURE: CPT

## 2025-06-19 PROCEDURE — 85027 COMPLETE CBC AUTOMATED: CPT

## 2025-06-19 PROCEDURE — 84482 T3 REVERSE: CPT

## 2025-06-19 PROCEDURE — 95705 EEG W/O VID 2-12 HR UNMNTR: CPT

## 2025-06-19 PROCEDURE — 6370000000 HC RX 637 (ALT 250 FOR IP): Performed by: INTERNAL MEDICINE

## 2025-06-19 PROCEDURE — 82272 OCCULT BLD FECES 1-3 TESTS: CPT

## 2025-06-19 PROCEDURE — 88305 TISSUE EXAM BY PATHOLOGIST: CPT

## 2025-06-19 PROCEDURE — 84481 FREE ASSAY (FT-3): CPT

## 2025-06-19 PROCEDURE — 82010 KETONE BODYS QUAN: CPT

## 2025-06-19 PROCEDURE — 85018 HEMOGLOBIN: CPT

## 2025-06-19 PROCEDURE — 36430 TRANSFUSION BLD/BLD COMPNT: CPT

## 2025-06-19 PROCEDURE — 2720000010 HC SURG SUPPLY STERILE

## 2025-06-19 PROCEDURE — 87205 SMEAR GRAM STAIN: CPT

## 2025-06-19 PROCEDURE — 2000000000 HC ICU R&B

## 2025-06-19 PROCEDURE — 36556 INSERT NON-TUNNEL CV CATH: CPT

## 2025-06-19 PROCEDURE — 85610 PROTHROMBIN TIME: CPT

## 2025-06-19 PROCEDURE — 82945 GLUCOSE OTHER FLUID: CPT

## 2025-06-19 PROCEDURE — 83615 LACTATE (LD) (LDH) ENZYME: CPT

## 2025-06-19 PROCEDURE — 71250 CT THORAX DX C-: CPT

## 2025-06-19 PROCEDURE — P9016 RBC LEUKOCYTES REDUCED: HCPCS

## 2025-06-19 PROCEDURE — 87070 CULTURE OTHR SPECIMN AEROBIC: CPT

## 2025-06-19 PROCEDURE — 85025 COMPLETE CBC W/AUTO DIFF WBC: CPT

## 2025-06-19 PROCEDURE — 80202 ASSAY OF VANCOMYCIN: CPT

## 2025-06-19 PROCEDURE — 80053 COMPREHEN METABOLIC PANEL: CPT

## 2025-06-19 PROCEDURE — 88108 CYTOPATH CONCENTRATE TECH: CPT

## 2025-06-19 PROCEDURE — 87040 BLOOD CULTURE FOR BACTERIA: CPT

## 2025-06-19 PROCEDURE — 87075 CULTR BACTERIA EXCEPT BLOOD: CPT

## 2025-06-19 PROCEDURE — 99291 CRITICAL CARE FIRST HOUR: CPT

## 2025-06-19 PROCEDURE — 84165 PROTEIN E-PHORESIS SERUM: CPT

## 2025-06-19 PROCEDURE — 93005 ELECTROCARDIOGRAM TRACING: CPT

## 2025-06-19 PROCEDURE — 82803 BLOOD GASES ANY COMBINATION: CPT

## 2025-06-19 PROCEDURE — 6370000000 HC RX 637 (ALT 250 FOR IP): Performed by: PHYSICIAN ASSISTANT

## 2025-06-19 RX ORDER — SODIUM CHLORIDE 9 MG/ML
INJECTION, SOLUTION INTRAVENOUS PRN
Status: DISCONTINUED | OUTPATIENT
Start: 2025-06-19 | End: 2025-06-23 | Stop reason: SDUPTHER

## 2025-06-19 RX ORDER — HYDROCORTISONE SODIUM SUCCINATE 100 MG/2ML
100 INJECTION INTRAMUSCULAR; INTRAVENOUS EVERY 8 HOURS
Status: DISCONTINUED | OUTPATIENT
Start: 2025-06-19 | End: 2025-06-23

## 2025-06-19 RX ORDER — 0.9 % SODIUM CHLORIDE 0.9 %
15 INTRAVENOUS SOLUTION INTRAVENOUS ONCE
Status: COMPLETED | OUTPATIENT
Start: 2025-06-19 | End: 2025-06-19

## 2025-06-19 RX ORDER — PANTOPRAZOLE SODIUM 40 MG/10ML
40 INJECTION, POWDER, LYOPHILIZED, FOR SOLUTION INTRAVENOUS DAILY
Status: DISCONTINUED | OUTPATIENT
Start: 2025-06-19 | End: 2025-06-22

## 2025-06-19 RX ORDER — MORPHINE SULFATE 2 MG/ML
2 INJECTION, SOLUTION INTRAMUSCULAR; INTRAVENOUS EVERY 4 HOURS PRN
Refills: 0 | Status: DISCONTINUED | OUTPATIENT
Start: 2025-06-19 | End: 2025-06-30 | Stop reason: HOSPADM

## 2025-06-19 RX ORDER — POTASSIUM CHLORIDE 7.45 MG/ML
10 INJECTION INTRAVENOUS PRN
Status: ACTIVE | OUTPATIENT
Start: 2025-06-19 | End: 2025-06-22

## 2025-06-19 RX ORDER — SODIUM CHLORIDE 9 MG/ML
INJECTION, SOLUTION INTRAVENOUS PRN
Status: DISCONTINUED | OUTPATIENT
Start: 2025-06-19 | End: 2025-06-19

## 2025-06-19 RX ORDER — SODIUM CHLORIDE 0.9 % (FLUSH) 0.9 %
5-40 SYRINGE (ML) INJECTION EVERY 12 HOURS SCHEDULED
Status: DISCONTINUED | OUTPATIENT
Start: 2025-06-19 | End: 2025-06-30 | Stop reason: HOSPADM

## 2025-06-19 RX ORDER — FENTANYL CITRATE 50 UG/ML
50 INJECTION, SOLUTION INTRAMUSCULAR; INTRAVENOUS ONCE
Status: COMPLETED | OUTPATIENT
Start: 2025-06-19 | End: 2025-06-19

## 2025-06-19 RX ORDER — SODIUM CHLORIDE 9 MG/ML
INJECTION, SOLUTION INTRAVENOUS PRN
Status: DISCONTINUED | OUTPATIENT
Start: 2025-06-19 | End: 2025-06-22

## 2025-06-19 RX ORDER — DEXTROSE MONOHYDRATE, SODIUM CHLORIDE, AND POTASSIUM CHLORIDE 50; 1.49; 4.5 G/1000ML; G/1000ML; G/1000ML
INJECTION, SOLUTION INTRAVENOUS CONTINUOUS PRN
Status: DISCONTINUED | OUTPATIENT
Start: 2025-06-19 | End: 2025-06-20

## 2025-06-19 RX ORDER — SODIUM CHLORIDE 0.9 % (FLUSH) 0.9 %
5-40 SYRINGE (ML) INJECTION PRN
Status: DISCONTINUED | OUTPATIENT
Start: 2025-06-19 | End: 2025-06-30 | Stop reason: HOSPADM

## 2025-06-19 RX ORDER — MAGNESIUM SULFATE IN WATER 40 MG/ML
2000 INJECTION, SOLUTION INTRAVENOUS PRN
Status: DISCONTINUED | OUTPATIENT
Start: 2025-06-19 | End: 2025-06-30 | Stop reason: HOSPADM

## 2025-06-19 RX ORDER — SODIUM CHLORIDE 9 MG/ML
INJECTION, SOLUTION INTRAVENOUS CONTINUOUS
Status: DISCONTINUED | OUTPATIENT
Start: 2025-06-19 | End: 2025-06-22

## 2025-06-19 RX ORDER — FENTANYL CITRATE 50 UG/ML
INJECTION, SOLUTION INTRAMUSCULAR; INTRAVENOUS
Status: COMPLETED
Start: 2025-06-19 | End: 2025-06-19

## 2025-06-19 RX ORDER — PHENTOLAMINE MESYLATE 5 MG/1
5 INJECTION INTRAMUSCULAR; INTRAVENOUS ONCE
Status: COMPLETED | OUTPATIENT
Start: 2025-06-19 | End: 2025-06-19

## 2025-06-19 RX ADMIN — SODIUM CHLORIDE 4.5 UNITS/HR: 9 INJECTION, SOLUTION INTRAVENOUS at 03:03

## 2025-06-19 RX ADMIN — METOPROLOL TARTRATE 12.5 MG: 25 TABLET, FILM COATED ORAL at 11:09

## 2025-06-19 RX ADMIN — SODIUM CHLORIDE, PRESERVATIVE FREE 10 ML: 5 INJECTION INTRAVENOUS at 20:25

## 2025-06-19 RX ADMIN — POTASSIUM CHLORIDE, DEXTROSE MONOHYDRATE AND SODIUM CHLORIDE: 150; 5; 450 INJECTION, SOLUTION INTRAVENOUS at 08:10

## 2025-06-19 RX ADMIN — MIDODRINE HYDROCHLORIDE 10 MG: 10 TABLET ORAL at 18:13

## 2025-06-19 RX ADMIN — ACETAMINOPHEN 650 MG: 325 TABLET ORAL at 16:17

## 2025-06-19 RX ADMIN — HEPARIN SODIUM 5000 UNITS: 5000 INJECTION INTRAVENOUS; SUBCUTANEOUS at 00:36

## 2025-06-19 RX ADMIN — HYDROCORTISONE SODIUM SUCCINATE 100 MG: 100 INJECTION, POWDER, FOR SOLUTION INTRAMUSCULAR; INTRAVENOUS at 20:24

## 2025-06-19 RX ADMIN — MORPHINE SULFATE 2 MG: 2 INJECTION, SOLUTION INTRAMUSCULAR; INTRAVENOUS at 09:10

## 2025-06-19 RX ADMIN — MORPHINE SULFATE 2 MG: 2 INJECTION, SOLUTION INTRAMUSCULAR; INTRAVENOUS at 12:27

## 2025-06-19 RX ADMIN — HEPARIN SODIUM 5000 UNITS: 5000 INJECTION INTRAVENOUS; SUBCUTANEOUS at 11:10

## 2025-06-19 RX ADMIN — PIPERACILLIN AND TAZOBACTAM 3375 MG: 3; .375 INJECTION, POWDER, FOR SOLUTION INTRAVENOUS at 18:25

## 2025-06-19 RX ADMIN — PIPERACILLIN AND TAZOBACTAM 3375 MG: 3; .375 INJECTION, POWDER, FOR SOLUTION INTRAVENOUS at 10:13

## 2025-06-19 RX ADMIN — FENTANYL CITRATE 50 MCG: 50 INJECTION INTRAMUSCULAR; INTRAVENOUS at 01:51

## 2025-06-19 RX ADMIN — PIPERACILLIN AND TAZOBACTAM 4500 MG: 4; .5 INJECTION, POWDER, LYOPHILIZED, FOR SOLUTION INTRAVENOUS at 04:00

## 2025-06-19 RX ADMIN — SODIUM CHLORIDE 816 ML: 0.9 INJECTION, SOLUTION INTRAVENOUS at 02:52

## 2025-06-19 RX ADMIN — VANCOMYCIN HYDROCHLORIDE 750 MG: 1 INJECTION, POWDER, LYOPHILIZED, FOR SOLUTION INTRAVENOUS at 20:46

## 2025-06-19 RX ADMIN — MIDODRINE HYDROCHLORIDE 10 MG: 10 TABLET ORAL at 15:28

## 2025-06-19 RX ADMIN — HYDROCORTISONE SODIUM SUCCINATE 100 MG: 100 INJECTION, POWDER, FOR SOLUTION INTRAMUSCULAR; INTRAVENOUS at 11:10

## 2025-06-19 RX ADMIN — VANCOMYCIN HYDROCHLORIDE 1250 MG: 5 INJECTION, POWDER, LYOPHILIZED, FOR SOLUTION INTRAVENOUS at 04:58

## 2025-06-19 RX ADMIN — PANTOPRAZOLE SODIUM 40 MG: 40 INJECTION, POWDER, FOR SOLUTION INTRAVENOUS at 11:09

## 2025-06-19 RX ADMIN — 0.12% CHLORHEXIDINE GLUCONATE 15 ML: 1.2 RINSE ORAL at 20:24

## 2025-06-19 RX ADMIN — SODIUM CHLORIDE, PRESERVATIVE FREE 10 ML: 5 INJECTION INTRAVENOUS at 09:12

## 2025-06-19 RX ADMIN — Medication 6 MG: at 20:24

## 2025-06-19 RX ADMIN — MORPHINE SULFATE 2 MG: 2 INJECTION, SOLUTION INTRAMUSCULAR; INTRAVENOUS at 16:17

## 2025-06-19 RX ADMIN — POTASSIUM CHLORIDE, DEXTROSE MONOHYDRATE AND SODIUM CHLORIDE: 150; 5; 450 INJECTION, SOLUTION INTRAVENOUS at 15:08

## 2025-06-19 RX ADMIN — PHENTOLAMINE MESYLATE 5 MG: 5 INJECTION, POWDER, FOR SOLUTION INTRAMUSCULAR; INTRAVENOUS at 02:52

## 2025-06-19 RX ADMIN — FLUOXETINE HYDROCHLORIDE 40 MG: 20 CAPSULE ORAL at 11:09

## 2025-06-19 RX ADMIN — Medication 5 MCG/MIN: at 18:20

## 2025-06-19 RX ADMIN — MIRTAZAPINE 7.5 MG: 7.5 TABLET, FILM COATED ORAL at 20:24

## 2025-06-19 RX ADMIN — HYDROCORTISONE SODIUM SUCCINATE 100 MG: 100 INJECTION, POWDER, FOR SOLUTION INTRAMUSCULAR; INTRAVENOUS at 03:07

## 2025-06-19 RX ADMIN — SENNOSIDES, DOCUSATE SODIUM 1 TABLET: 50; 8.6 TABLET, FILM COATED ORAL at 20:24

## 2025-06-19 RX ADMIN — SODIUM CHLORIDE: 0.9 INJECTION, SOLUTION INTRAVENOUS at 03:55

## 2025-06-19 RX ADMIN — MIDODRINE HYDROCHLORIDE 10 MG: 10 TABLET ORAL at 11:09

## 2025-06-19 RX ADMIN — 0.12% CHLORHEXIDINE GLUCONATE 15 ML: 1.2 RINSE ORAL at 11:09

## 2025-06-19 RX ADMIN — MORPHINE SULFATE 2 MG: 2 INJECTION, SOLUTION INTRAMUSCULAR; INTRAVENOUS at 20:23

## 2025-06-19 RX ADMIN — FENTANYL CITRATE 50 MCG: 50 INJECTION, SOLUTION INTRAMUSCULAR; INTRAVENOUS at 01:51

## 2025-06-19 ASSESSMENT — PAIN DESCRIPTION - PAIN TYPE
TYPE: ACUTE PAIN
TYPE: ACUTE PAIN

## 2025-06-19 ASSESSMENT — PAIN SCALES - WONG BAKER: WONGBAKER_NUMERICALRESPONSE: HURTS WHOLE LOT

## 2025-06-19 ASSESSMENT — PAIN DESCRIPTION - ORIENTATION
ORIENTATION: LEFT;UPPER
ORIENTATION: LEFT
ORIENTATION: LEFT;UPPER

## 2025-06-19 ASSESSMENT — PAIN DESCRIPTION - FREQUENCY: FREQUENCY: CONTINUOUS

## 2025-06-19 ASSESSMENT — PAIN DESCRIPTION - DESCRIPTORS
DESCRIPTORS: ACHING;DISCOMFORT
DESCRIPTORS: ACHING;DISCOMFORT
DESCRIPTORS: ACHING;CRUSHING
DESCRIPTORS: ACHING
DESCRIPTORS: ACHING;DISCOMFORT

## 2025-06-19 ASSESSMENT — PAIN SCALES - GENERAL
PAINLEVEL_OUTOF10: 10
PAINLEVEL_OUTOF10: 8
PAINLEVEL_OUTOF10: 8
PAINLEVEL_OUTOF10: 7

## 2025-06-19 ASSESSMENT — PAIN DESCRIPTION - LOCATION
LOCATION: CHEST
LOCATION: ARM
LOCATION: ARM
LOCATION: CHEST

## 2025-06-19 ASSESSMENT — PAIN DESCRIPTION - ONSET: ONSET: ON-GOING

## 2025-06-19 ASSESSMENT — PAIN - FUNCTIONAL ASSESSMENT: PAIN_FUNCTIONAL_ASSESSMENT: PREVENTS OR INTERFERES SOME ACTIVE ACTIVITIES AND ADLS

## 2025-06-19 NOTE — FLOWSHEET NOTE
Patient arrived per cart to 3B. Heart monitor applied and vitals taken.  Patient was noted to be hypotensive, pale, cold and clammy. Angie Sanchez at beside with orders placed. Patient was still hypotensive despite fluid bolus. Informed MANUEL Washington with orders to start Levophed drip and transfer to ICU.     2350: Patient was transferred up to ICU with lifepack placed on her. Report given to CHANDNI Farmer.

## 2025-06-19 NOTE — CONSENT
Informed Consent for Blood Component Transfusion Note    I have discussed with the patient the rationale for blood component transfusion; its benefits in treating or preventing fatigue, organ damage, or death; and its risk which includes mild transfusion reactions, rare risk of blood borne infection, or more serious but rare reactions. I have discussed the alternatives to transfusion, including the risk and consequences of not receiving transfusion. The patient had an opportunity to ask questions and had agreed to proceed with transfusion of blood components.    Electronically signed by Niko Whyte MD on 6/19/25 at 1:35 AM EDT

## 2025-06-19 NOTE — SIGNIFICANT EVENT
Radha Yousif is a 43 yo female with history of DM2, depression, CVA, and is non-verbal.      She was admitted on 6/12/25 with fatigue and AMS.  She was found to have ADAM on CKD and severe, non-PTH mediated hypercalcemia (up to 17.9) that did require emergent dialysis. Nephrology and endocrinology have been following.  Further workup showed 1, 25 Vitamin D, elevated ACE level and multiple pulmonary nodules-possible sarcoidosis-underwent lung bx on 6/16.     She developed DI during admission with hypernatremia and polyuria.  Was given DDAVP and urine osmol increased to 500 from 99 confirming central DI. MRI was obtained and MRI with no obvious structural lesion, leaving open the possibility for an infiltrative disorder like sarcoid.      She also was found to have adrenal insufficiency.  ACTH stimulation test abnormal. 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  Currently being treated with Solu-cortef.     She underwent CT scan for AMS and was found to have a meningioma which neurosurgery was consulted and felt was stable and could follow up OP.     Responded to bedside for rapid response activation.  Patient with acute episode of hypotension and unresponsiveness.  She is baseline non-verbal but typically alert.  On arrival, the patient is quite pale.  Blood pressure 73/43.  Her skin is cool to touch.  Pale mucous membranes. Glucose high-480's.  Fluids initiated wide open.  Improved initially to the 80's.  CBC, BMP, Lactic acid, BHB, venous pH all ordered STAT.  With initial improvement, the patient was transferred to SD, however, on arrival to stepdown, the patient' blood pressure dropped to 63/43.  She was started on Levophed and transferred to the intensive care unit.      Review of labs yielded severe anemia with hemoglobin of 5.1 and an elevated WBC 33.  No obvious signs of bleeding or infection at this time.  Sodium 127-corrected 132.  Glucose was noted to be 133 (1600 6/18), now consistently in the high 400's

## 2025-06-19 NOTE — H&P
CRITICAL CARE- History & Physical      Patient: Radha Yousif    Unit/Bed:4D-05/005-A  YOB: 1980  MRN: [id number]   Acct: [id number]   PCP: Miriam Coppola MD    Date of Service: Pt seen/examined on 06/19/25  and Admitted to Inpatient with expected LOS greater than two midnights due to medical therapy.     Chief Complaint:  Fatigue    Assessment and Plan:-  Hypotension.  Most likely hypovolemic.  Nephrology following and believe patient to be severely volume depleted.  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  Endocrinology also following for adrenal insufficiency, already on hydrocortisone 25 mg twice daily.  No evidence for infection, well-perfused and unlikely cardiogenic in nature.  Lactic acid pending.  Continue fluid resuscitation  Pressors as needed.  Increased steroids to solu-cortef 100mg Q8H.  Blood cultures, urine culture, pneumonia panel.  Broad antibiotics.  Hyperglycemia.  Endocrinology following.  A1c 7% on arrival.  Widely variable blood sugars from below 100 to greater than 400.  Beta hydroxybutyrate pending.  Patient is on steroids.  Receiving tube feeds.  Was started on 15 insulin glargine daily, with sliding scale correction.  Insulin gtt  Hypercalcemia. Ongoing fluids, s/p calcitonin. Workup includes possible sarcoidosis.  Hyponatremia. Related to ongoing fluid infusions.  History of prior CVAs. Left sided hemiparesis, dysphagia, aphasia.  ADAM on CKD, improved. s/p dialysis.   History of meningioma. Evaluated by neurosurg. No acute interventions planned.    History Of Present Illness:    Radha Yousif is a 44 y.o. female with a history of multiple CVAs with resultant left side paraplegia, aphasia, and dysphagia status post PEG tube, hypertension, hyperlipidemia, type 2 diabetes originally admitted 6/12/2025 for ADAM on CKD with hypercalcemia.  Since admission, patient has had dialysis catheter placed by IR and undergone dialysis with nephrology and since had

## 2025-06-20 ENCOUNTER — APPOINTMENT (OUTPATIENT)
Dept: CT IMAGING | Age: 45
DRG: 987 | End: 2025-06-20
Attending: INTERNAL MEDICINE
Payer: COMMERCIAL

## 2025-06-20 ENCOUNTER — APPOINTMENT (OUTPATIENT)
Dept: GENERAL RADIOLOGY | Age: 45
DRG: 987 | End: 2025-06-20
Attending: INTERNAL MEDICINE
Payer: COMMERCIAL

## 2025-06-20 LAB
ANION GAP SERPL CALC-SCNC: 10 MEQ/L (ref 8–16)
ANION GAP SERPL CALC-SCNC: 10 MEQ/L (ref 8–16)
BUN SERPL-MCNC: 25 MG/DL (ref 8–23)
BUN SERPL-MCNC: 28 MG/DL (ref 8–23)
CALCIUM SERPL-MCNC: 7.1 MG/DL (ref 8.6–10)
CALCIUM SERPL-MCNC: 7.9 MG/DL (ref 8.6–10)
CANCER AG19-9 SERPL-ACNC: 28 U/ML (ref 0–35)
CEA SERPL-MCNC: 2.3 NG/ML (ref 0–3.8)
CHARACTER, BODY FLUID: ABNORMAL
CHLORIDE SERPL-SCNC: 107 MEQ/L (ref 98–111)
CHLORIDE SERPL-SCNC: 110 MEQ/L (ref 98–111)
CO2 SERPL-SCNC: 16 MEQ/L (ref 22–29)
CO2 SERPL-SCNC: 17 MEQ/L (ref 22–29)
COLOR FLD: ABNORMAL
CREAT SERPL-MCNC: 1.3 MG/DL (ref 0.5–0.9)
CREAT SERPL-MCNC: 1.4 MG/DL (ref 0.5–0.9)
DEPRECATED RDW RBC AUTO: 51 FL (ref 35–45)
DEPRECATED RDW RBC AUTO: 51.3 FL (ref 35–45)
ERYTHROCYTE [DISTWIDTH] IN BLOOD BY AUTOMATED COUNT: 15.9 % (ref 11.5–14.5)
ERYTHROCYTE [DISTWIDTH] IN BLOOD BY AUTOMATED COUNT: 16 % (ref 11.5–14.5)
FIBRINOGEN PPP-MCNC: 310 MG/100ML (ref 155–475)
GFR SERPL CREATININE-BSD FRML MDRD: 47 ML/MIN/1.73M2
GFR SERPL CREATININE-BSD FRML MDRD: 52 ML/MIN/1.73M2
GLUCOSE BLD STRIP.AUTO-MCNC: 131 MG/DL (ref 70–108)
GLUCOSE BLD STRIP.AUTO-MCNC: 135 MG/DL (ref 70–108)
GLUCOSE BLD STRIP.AUTO-MCNC: 144 MG/DL (ref 70–108)
GLUCOSE BLD STRIP.AUTO-MCNC: 152 MG/DL (ref 70–108)
GLUCOSE BLD STRIP.AUTO-MCNC: 155 MG/DL (ref 70–108)
GLUCOSE BLD STRIP.AUTO-MCNC: 162 MG/DL (ref 70–108)
GLUCOSE BLD STRIP.AUTO-MCNC: 181 MG/DL (ref 70–108)
GLUCOSE BLD STRIP.AUTO-MCNC: 184 MG/DL (ref 70–108)
GLUCOSE BLD STRIP.AUTO-MCNC: 186 MG/DL (ref 70–108)
GLUCOSE BLD STRIP.AUTO-MCNC: 187 MG/DL (ref 70–108)
GLUCOSE BLD STRIP.AUTO-MCNC: 210 MG/DL (ref 70–108)
GLUCOSE SERPL-MCNC: 160 MG/DL (ref 74–109)
GLUCOSE SERPL-MCNC: 202 MG/DL (ref 74–109)
GRANULOCYTES NFR FLD AUTO: 74 %
HCT VFR BLD AUTO: 24.8 % (ref 37–47)
HCT VFR BLD AUTO: 25.2 % (ref 37–47)
HCT VFR BLD AUTO: 25.3 % (ref 37–47)
HCT VFR BLD AUTO: 30.1 % (ref 37–47)
HCT VFR BLD AUTO: 32 % (ref 37–47)
HGB BLD-MCNC: 10.4 GM/DL (ref 12–16)
HGB BLD-MCNC: 8.5 GM/DL (ref 12–16)
HGB BLD-MCNC: 8.5 GM/DL (ref 12–16)
HGB BLD-MCNC: 8.7 GM/DL (ref 12–16)
HGB BLD-MCNC: 9.9 GM/DL (ref 12–16)
MCH RBC QN AUTO: 30.7 PG (ref 26–33)
MCH RBC QN AUTO: 30.7 PG (ref 26–33)
MCHC RBC AUTO-ENTMCNC: 33.7 GM/DL (ref 32.2–35.5)
MCHC RBC AUTO-ENTMCNC: 34.3 GM/DL (ref 32.2–35.5)
MCV RBC AUTO: 89.5 FL (ref 81–99)
MCV RBC AUTO: 91 FL (ref 81–99)
MESOTHELIAL CELLS BODY FLUID: ABNORMAL
MONONUC CELLS NFR FLD AUTO: 26 %
MRSA SPEC QL CULT: NORMAL
NUC CELL # FLD AUTO: 7480 /CUMM (ref 0–500)
PATHOLOGIST REVIEW: ABNORMAL
PLATELET # BLD AUTO: 184 THOU/MM3 (ref 130–400)
PLATELET # BLD AUTO: 197 THOU/MM3 (ref 130–400)
PMV BLD AUTO: 11.6 FL (ref 9.4–12.4)
PMV BLD AUTO: 11.7 FL (ref 9.4–12.4)
POTASSIUM SERPL-SCNC: 4.5 MEQ/L (ref 3.5–5.2)
POTASSIUM SERPL-SCNC: 5 MEQ/L (ref 3.5–5.2)
RBC # BLD AUTO: 2.77 MILL/MM3 (ref 4.2–5.4)
RBC # BLD AUTO: 2.77 MILL/MM3 (ref 4.2–5.4)
RBC # FLD AUTO: ABNORMAL /CUMM
SCAN OF BLOOD SMEAR: NORMAL
SODIUM SERPL-SCNC: 133 MEQ/L (ref 135–145)
SODIUM SERPL-SCNC: 137 MEQ/L (ref 135–145)
SPECIMEN: ABNORMAL
T3FREE SERPL-MCNC: 1.15 PG/ML (ref 2–4.4)
TOTAL VOLUME RECEIVED BODY FLUID: 20 ML
VANCOMYCIN SERPL-MCNC: 14.3 UG/ML (ref 10–39.9)
VIT A SERPL-MCNC: NORMAL UG/ML
WBC # BLD AUTO: 33.2 THOU/MM3 (ref 4.8–10.8)
WBC # BLD AUTO: 34.2 THOU/MM3 (ref 4.8–10.8)

## 2025-06-20 PROCEDURE — 2000000000 HC ICU R&B

## 2025-06-20 PROCEDURE — 36430 TRANSFUSION BLD/BLD COMPNT: CPT

## 2025-06-20 PROCEDURE — 80202 ASSAY OF VANCOMYCIN: CPT

## 2025-06-20 PROCEDURE — 2580000003 HC RX 258

## 2025-06-20 PROCEDURE — 2580000003 HC RX 258: Performed by: PHARMACIST

## 2025-06-20 PROCEDURE — 85384 FIBRINOGEN ACTIVITY: CPT

## 2025-06-20 PROCEDURE — 97530 THERAPEUTIC ACTIVITIES: CPT

## 2025-06-20 PROCEDURE — 6370000000 HC RX 637 (ALT 250 FOR IP): Performed by: INTERNAL MEDICINE

## 2025-06-20 PROCEDURE — 71045 X-RAY EXAM CHEST 1 VIEW: CPT

## 2025-06-20 PROCEDURE — 6370000000 HC RX 637 (ALT 250 FOR IP)

## 2025-06-20 PROCEDURE — 36415 COLL VENOUS BLD VENIPUNCTURE: CPT

## 2025-06-20 PROCEDURE — 85027 COMPLETE CBC AUTOMATED: CPT

## 2025-06-20 PROCEDURE — 99232 SBSQ HOSP IP/OBS MODERATE 35: CPT | Performed by: INTERNAL MEDICINE

## 2025-06-20 PROCEDURE — 6360000002 HC RX W HCPCS: Performed by: PHARMACIST

## 2025-06-20 PROCEDURE — 99291 CRITICAL CARE FIRST HOUR: CPT | Performed by: INTERNAL MEDICINE

## 2025-06-20 PROCEDURE — 82948 REAGENT STRIP/BLOOD GLUCOSE: CPT

## 2025-06-20 PROCEDURE — 85018 HEMOGLOBIN: CPT

## 2025-06-20 PROCEDURE — 86301 IMMUNOASSAY TUMOR CA 19-9: CPT

## 2025-06-20 PROCEDURE — 2500000003 HC RX 250 WO HCPCS: Performed by: PHYSICIAN ASSISTANT

## 2025-06-20 PROCEDURE — 85014 HEMATOCRIT: CPT

## 2025-06-20 PROCEDURE — 80048 BASIC METABOLIC PNL TOTAL CA: CPT

## 2025-06-20 PROCEDURE — 2500000003 HC RX 250 WO HCPCS

## 2025-06-20 PROCEDURE — 6360000002 HC RX W HCPCS

## 2025-06-20 PROCEDURE — 71250 CT THORAX DX C-: CPT

## 2025-06-20 PROCEDURE — 82378 CARCINOEMBRYONIC ANTIGEN: CPT

## 2025-06-20 PROCEDURE — 6370000000 HC RX 637 (ALT 250 FOR IP): Performed by: PHYSICIAN ASSISTANT

## 2025-06-20 RX ORDER — SODIUM CHLORIDE 9 MG/ML
INJECTION, SOLUTION INTRAVENOUS PRN
Status: DISCONTINUED | OUTPATIENT
Start: 2025-06-20 | End: 2025-06-22

## 2025-06-20 RX ORDER — INSULIN GLARGINE 100 [IU]/ML
5 INJECTION, SOLUTION SUBCUTANEOUS DAILY
Status: DISCONTINUED | OUTPATIENT
Start: 2025-06-20 | End: 2025-06-30 | Stop reason: HOSPADM

## 2025-06-20 RX ADMIN — MORPHINE SULFATE 2 MG: 2 INJECTION, SOLUTION INTRAMUSCULAR; INTRAVENOUS at 08:25

## 2025-06-20 RX ADMIN — MORPHINE SULFATE 2 MG: 2 INJECTION, SOLUTION INTRAMUSCULAR; INTRAVENOUS at 00:40

## 2025-06-20 RX ADMIN — SODIUM CHLORIDE 4 UNITS/HR: 9 INJECTION, SOLUTION INTRAVENOUS at 00:16

## 2025-06-20 RX ADMIN — 0.12% CHLORHEXIDINE GLUCONATE 15 ML: 1.2 RINSE ORAL at 20:43

## 2025-06-20 RX ADMIN — PIPERACILLIN AND TAZOBACTAM 3375 MG: 3; .375 INJECTION, POWDER, FOR SOLUTION INTRAVENOUS at 10:29

## 2025-06-20 RX ADMIN — SODIUM CHLORIDE, PRESERVATIVE FREE 30 ML: 5 INJECTION INTRAVENOUS at 20:44

## 2025-06-20 RX ADMIN — HYDROCORTISONE SODIUM SUCCINATE 100 MG: 100 INJECTION, POWDER, FOR SOLUTION INTRAMUSCULAR; INTRAVENOUS at 02:20

## 2025-06-20 RX ADMIN — SENNOSIDES, DOCUSATE SODIUM 1 TABLET: 50; 8.6 TABLET, FILM COATED ORAL at 20:43

## 2025-06-20 RX ADMIN — SENNOSIDES, DOCUSATE SODIUM 1 TABLET: 50; 8.6 TABLET, FILM COATED ORAL at 08:29

## 2025-06-20 RX ADMIN — MIRTAZAPINE 7.5 MG: 7.5 TABLET, FILM COATED ORAL at 20:43

## 2025-06-20 RX ADMIN — INSULIN GLARGINE 5 UNITS: 100 INJECTION, SOLUTION SUBCUTANEOUS at 02:20

## 2025-06-20 RX ADMIN — MIDODRINE HYDROCHLORIDE 10 MG: 10 TABLET ORAL at 16:38

## 2025-06-20 RX ADMIN — INSULIN LISPRO 2 UNITS: 100 INJECTION, SOLUTION INTRAVENOUS; SUBCUTANEOUS at 07:05

## 2025-06-20 RX ADMIN — SODIUM CHLORIDE, PRESERVATIVE FREE 10 ML: 5 INJECTION INTRAVENOUS at 20:44

## 2025-06-20 RX ADMIN — MIDODRINE HYDROCHLORIDE 10 MG: 10 TABLET ORAL at 08:29

## 2025-06-20 RX ADMIN — FLUOXETINE HYDROCHLORIDE 40 MG: 20 CAPSULE ORAL at 08:29

## 2025-06-20 RX ADMIN — PIPERACILLIN AND TAZOBACTAM 3375 MG: 3; .375 INJECTION, POWDER, FOR SOLUTION INTRAVENOUS at 17:55

## 2025-06-20 RX ADMIN — 0.12% CHLORHEXIDINE GLUCONATE 15 ML: 1.2 RINSE ORAL at 08:37

## 2025-06-20 RX ADMIN — SODIUM CHLORIDE, PRESERVATIVE FREE 10 ML: 5 INJECTION INTRAVENOUS at 08:29

## 2025-06-20 RX ADMIN — HYDROCORTISONE SODIUM SUCCINATE 100 MG: 100 INJECTION, POWDER, FOR SOLUTION INTRAMUSCULAR; INTRAVENOUS at 11:59

## 2025-06-20 RX ADMIN — INSULIN LISPRO 2 UNITS: 100 INJECTION, SOLUTION INTRAVENOUS; SUBCUTANEOUS at 12:12

## 2025-06-20 RX ADMIN — PANTOPRAZOLE SODIUM 40 MG: 40 INJECTION, POWDER, FOR SOLUTION INTRAVENOUS at 08:29

## 2025-06-20 RX ADMIN — METOPROLOL TARTRATE 12.5 MG: 25 TABLET, FILM COATED ORAL at 08:29

## 2025-06-20 RX ADMIN — VANCOMYCIN HYDROCHLORIDE 750 MG: 1 INJECTION, POWDER, LYOPHILIZED, FOR SOLUTION INTRAVENOUS at 16:37

## 2025-06-20 RX ADMIN — MIDODRINE HYDROCHLORIDE 10 MG: 10 TABLET ORAL at 11:59

## 2025-06-20 RX ADMIN — HYDROCORTISONE SODIUM SUCCINATE 100 MG: 100 INJECTION, POWDER, FOR SOLUTION INTRAMUSCULAR; INTRAVENOUS at 17:46

## 2025-06-20 RX ADMIN — PIPERACILLIN AND TAZOBACTAM 3375 MG: 3; .375 INJECTION, POWDER, FOR SOLUTION INTRAVENOUS at 01:27

## 2025-06-20 RX ADMIN — INSULIN GLARGINE 5 UNITS: 100 INJECTION, SOLUTION SUBCUTANEOUS at 20:43

## 2025-06-20 ASSESSMENT — PAIN DESCRIPTION - ORIENTATION: ORIENTATION: LEFT

## 2025-06-20 ASSESSMENT — PAIN SCALES - GENERAL
PAINLEVEL_OUTOF10: 0
PAINLEVEL_OUTOF10: 4
PAINLEVEL_OUTOF10: 0

## 2025-06-20 ASSESSMENT — PAIN DESCRIPTION - DESCRIPTORS: DESCRIPTORS: ACHING

## 2025-06-20 ASSESSMENT — PAIN DESCRIPTION - LOCATION: LOCATION: NECK

## 2025-06-20 NOTE — CARE COORDINATION
6/20/25, 2:24 PM EDT    DISCHARGE ON GOING EVALUATION    Radha KRISHNA [facility] day: 8  Location: -05/005-A Reason for admit: Acute renal failure [N17.9]  Liver cancer (HCC) [C22.9]  Hypercalcemia [E83.52]  ARF (acute renal failure) [N17.9]     Procedures:   6/16/25 Flexible fiberoptic bronchoscopy, diagnostic BAL of left lower lobe, transbronchial biopsy of left lower lobe, EBUS with biopsy of 3 lymph node stations   6/19 Left Chest tube placed  6/19 CVC [facility]    Imaging since last note:   6/19 CT Chest: 1. Large left pleural effusion with compressive atelectasis and   mediastinal shift to the right. A small pocket of air within the pleural   cavity on the left may be present and the pleural fluid on the left is   greater than simple fluid attenuation therefore consider hemopneumothorax   on the left. This exam was compromised by motion. Left IJ CVC tip likely   in the SVC which can not be confirmed with a fluoroscopy study.  2. Patchy interstitial and airspace disease right lung. Minimal   pleural-parenchymal disease right lower lobe.  3. Peg tube positioning can not be confirmed with a fluoroscopy study.  6/19 CT Abd/pelvis: 1. Drainage catheter in the left subphrenic space. Gastrostomy tube in place  2. Left lower lobe infiltrate and left pleural effusion, significantly worse  than on previous study dated 6/11/2025.  3. Splenomegaly. Multiple hypodensities in the spleen which could represent  inflammatory process versus metastatic disease.  4. Punctate calcifications in the right and left kidneys. No associated  hydronephrosis.  6/20 CXR: Stable extensive pleural-parenchymal disease left lung.     Barriers to Discharge: Rapid response on 6/18 with hypotension. Transferred to ICU and started on levophed drip. Received 2 PRBC yesterday. Left thoracentesis w/Left chest tube placement yesterday for pleural effusion. Overnight chest tube clotted and was flushed. Received 1 PRBC overnight.     On room air.

## 2025-06-21 ENCOUNTER — APPOINTMENT (OUTPATIENT)
Dept: GENERAL RADIOLOGY | Age: 45
DRG: 987 | End: 2025-06-21
Attending: INTERNAL MEDICINE
Payer: COMMERCIAL

## 2025-06-21 LAB
ANION GAP SERPL CALC-SCNC: 14 MEQ/L (ref 8–16)
BUN SERPL-MCNC: 28 MG/DL (ref 8–23)
CALCIUM SERPL-MCNC: 8.5 MG/DL (ref 8.6–10)
CHLORIDE SERPL-SCNC: 114 MEQ/L (ref 98–111)
CO2 SERPL-SCNC: 17 MEQ/L (ref 22–29)
CREAT SERPL-MCNC: 1.4 MG/DL (ref 0.5–0.9)
DEPRECATED RDW RBC AUTO: 48.2 FL (ref 35–45)
ERYTHROCYTE [DISTWIDTH] IN BLOOD BY AUTOMATED COUNT: 17.4 % (ref 11.5–14.5)
GFR SERPL CREATININE-BSD FRML MDRD: 47 ML/MIN/1.73M2
GLUCOSE BLD STRIP.AUTO-MCNC: 148 MG/DL (ref 70–108)
GLUCOSE BLD STRIP.AUTO-MCNC: 172 MG/DL (ref 70–108)
GLUCOSE BLD STRIP.AUTO-MCNC: 231 MG/DL (ref 70–108)
GLUCOSE BLD STRIP.AUTO-MCNC: 324 MG/DL (ref 70–108)
GLUCOSE SERPL-MCNC: 253 MG/DL (ref 74–109)
HCT VFR BLD AUTO: 26.5 % (ref 37–47)
HCT VFR BLD AUTO: 26.8 % (ref 37–47)
HCT VFR BLD AUTO: 27.6 % (ref 37–47)
HCT VFR BLD AUTO: 29.1 % (ref 37–47)
HCT VFR BLD AUTO: 30.2 % (ref 37–47)
HGB BLD-MCNC: 10 GM/DL (ref 12–16)
HGB BLD-MCNC: 8.6 GM/DL (ref 12–16)
HGB BLD-MCNC: 8.6 GM/DL (ref 12–16)
HGB BLD-MCNC: 9.2 GM/DL (ref 12–16)
HGB BLD-MCNC: 9.8 GM/DL (ref 12–16)
MCH RBC QN AUTO: 30.3 PG (ref 26–33)
MCHC RBC AUTO-ENTMCNC: 33.7 GM/DL (ref 32.2–35.5)
MCV RBC AUTO: 90.1 FL (ref 81–99)
PLATELET # BLD AUTO: 177 THOU/MM3 (ref 130–400)
PMV BLD AUTO: 11 FL (ref 9.4–12.4)
POTASSIUM SERPL-SCNC: 3.7 MEQ/L (ref 3.5–5.2)
PROCALCITONIN SERPL IA-MCNC: 0.79 NG/ML (ref 0.01–0.09)
RBC # BLD AUTO: 3.23 MILL/MM3 (ref 4.2–5.4)
SODIUM SERPL-SCNC: 142 MEQ/L (ref 135–145)
SODIUM SERPL-SCNC: 145 MEQ/L (ref 135–145)
SODIUM SERPL-SCNC: 145 MEQ/L (ref 135–145)
VANCOMYCIN SERPL-MCNC: 18.7 UG/ML (ref 10–39.9)
WBC # BLD AUTO: 24.1 THOU/MM3 (ref 4.8–10.8)

## 2025-06-21 PROCEDURE — 3E033XZ INTRODUCTION OF VASOPRESSOR INTO PERIPHERAL VEIN, PERCUTANEOUS APPROACH: ICD-10-PCS | Performed by: INTERNAL MEDICINE

## 2025-06-21 PROCEDURE — 2580000003 HC RX 258

## 2025-06-21 PROCEDURE — 71045 X-RAY EXAM CHEST 1 VIEW: CPT

## 2025-06-21 PROCEDURE — 84145 PROCALCITONIN (PCT): CPT

## 2025-06-21 PROCEDURE — 84295 ASSAY OF SERUM SODIUM: CPT

## 2025-06-21 PROCEDURE — 6370000000 HC RX 637 (ALT 250 FOR IP)

## 2025-06-21 PROCEDURE — 2500000003 HC RX 250 WO HCPCS

## 2025-06-21 PROCEDURE — 0DH63UZ INSERTION OF FEEDING DEVICE INTO STOMACH, PERCUTANEOUS APPROACH: ICD-10-PCS | Performed by: INTERNAL MEDICINE

## 2025-06-21 PROCEDURE — 6360000002 HC RX W HCPCS

## 2025-06-21 PROCEDURE — 92526 ORAL FUNCTION THERAPY: CPT

## 2025-06-21 PROCEDURE — 85014 HEMATOCRIT: CPT

## 2025-06-21 PROCEDURE — 99232 SBSQ HOSP IP/OBS MODERATE 35: CPT | Performed by: INTERNAL MEDICINE

## 2025-06-21 PROCEDURE — 49465 FLUORO EXAM OF G/COLON TUBE: CPT

## 2025-06-21 PROCEDURE — 85018 HEMOGLOBIN: CPT

## 2025-06-21 PROCEDURE — 80202 ASSAY OF VANCOMYCIN: CPT

## 2025-06-21 PROCEDURE — 6370000000 HC RX 637 (ALT 250 FOR IP): Performed by: PHYSICIAN ASSISTANT

## 2025-06-21 PROCEDURE — 2000000000 HC ICU R&B

## 2025-06-21 PROCEDURE — 80048 BASIC METABOLIC PNL TOTAL CA: CPT

## 2025-06-21 PROCEDURE — 2500000003 HC RX 250 WO HCPCS: Performed by: PHYSICIAN ASSISTANT

## 2025-06-21 PROCEDURE — 36415 COLL VENOUS BLD VENIPUNCTURE: CPT

## 2025-06-21 PROCEDURE — 2580000003 HC RX 258: Performed by: INTERNAL MEDICINE

## 2025-06-21 PROCEDURE — 85027 COMPLETE CBC AUTOMATED: CPT

## 2025-06-21 PROCEDURE — 99291 CRITICAL CARE FIRST HOUR: CPT | Performed by: SURGERY

## 2025-06-21 PROCEDURE — 82948 REAGENT STRIP/BLOOD GLUCOSE: CPT

## 2025-06-21 PROCEDURE — 6360000004 HC RX CONTRAST MEDICATION

## 2025-06-21 RX ORDER — DESMOPRESSIN ACETATE 4 UG/ML
0.25 INJECTION, SOLUTION INTRAVENOUS; SUBCUTANEOUS 2 TIMES DAILY
Status: COMPLETED | OUTPATIENT
Start: 2025-06-21 | End: 2025-06-21

## 2025-06-21 RX ORDER — 0.9 % SODIUM CHLORIDE 0.9 %
500 INTRAVENOUS SOLUTION INTRAVENOUS ONCE
Status: COMPLETED | OUTPATIENT
Start: 2025-06-21 | End: 2025-06-21

## 2025-06-21 RX ORDER — DEXTROSE MONOHYDRATE 50 MG/ML
INJECTION, SOLUTION INTRAVENOUS CONTINUOUS
Status: ACTIVE | OUTPATIENT
Start: 2025-06-21 | End: 2025-06-21

## 2025-06-21 RX ORDER — DIATRIZOATE MEGLUMINE AND DIATRIZOATE SODIUM 660; 100 MG/ML; MG/ML
30 SOLUTION ORAL; RECTAL
Status: DISCONTINUED | OUTPATIENT
Start: 2025-06-21 | End: 2025-06-23 | Stop reason: SDUPTHER

## 2025-06-21 RX ADMIN — MORPHINE SULFATE 2 MG: 2 INJECTION, SOLUTION INTRAMUSCULAR; INTRAVENOUS at 10:24

## 2025-06-21 RX ADMIN — INSULIN GLARGINE 5 UNITS: 100 INJECTION, SOLUTION SUBCUTANEOUS at 19:56

## 2025-06-21 RX ADMIN — HYDROCORTISONE SODIUM SUCCINATE 100 MG: 100 INJECTION, POWDER, FOR SOLUTION INTRAMUSCULAR; INTRAVENOUS at 04:27

## 2025-06-21 RX ADMIN — DIATRIZOATE MEGLUMINE AND DIATRIZOATE SODIUM 30 ML: 660; 100 LIQUID ORAL; RECTAL at 10:46

## 2025-06-21 RX ADMIN — PIPERACILLIN AND TAZOBACTAM 3375 MG: 3; .375 INJECTION, POWDER, FOR SOLUTION INTRAVENOUS at 09:42

## 2025-06-21 RX ADMIN — FLUOXETINE HYDROCHLORIDE 40 MG: 20 CAPSULE ORAL at 09:28

## 2025-06-21 RX ADMIN — PIPERACILLIN AND TAZOBACTAM 3375 MG: 3; .375 INJECTION, POWDER, FOR SOLUTION INTRAVENOUS at 01:39

## 2025-06-21 RX ADMIN — PIPERACILLIN AND TAZOBACTAM 3375 MG: 3; .375 INJECTION, POWDER, FOR SOLUTION INTRAVENOUS at 17:35

## 2025-06-21 RX ADMIN — PANTOPRAZOLE SODIUM 40 MG: 40 INJECTION, POWDER, FOR SOLUTION INTRAVENOUS at 09:29

## 2025-06-21 RX ADMIN — MIDODRINE HYDROCHLORIDE 10 MG: 10 TABLET ORAL at 12:05

## 2025-06-21 RX ADMIN — 0.12% CHLORHEXIDINE GLUCONATE 15 ML: 1.2 RINSE ORAL at 09:28

## 2025-06-21 RX ADMIN — SODIUM CHLORIDE, PRESERVATIVE FREE 10 ML: 5 INJECTION INTRAVENOUS at 20:03

## 2025-06-21 RX ADMIN — MIDODRINE HYDROCHLORIDE 10 MG: 10 TABLET ORAL at 09:28

## 2025-06-21 RX ADMIN — DESMOPRESSIN ACETATE 0.24 MCG: 4 INJECTION, SOLUTION INTRAVENOUS; SUBCUTANEOUS at 21:39

## 2025-06-21 RX ADMIN — DESMOPRESSIN ACETATE 0.24 MCG: 4 INJECTION, SOLUTION INTRAVENOUS; SUBCUTANEOUS at 12:06

## 2025-06-21 RX ADMIN — HYDROCORTISONE SODIUM SUCCINATE 100 MG: 100 INJECTION, POWDER, FOR SOLUTION INTRAMUSCULAR; INTRAVENOUS at 11:19

## 2025-06-21 RX ADMIN — MIRTAZAPINE 7.5 MG: 7.5 TABLET, FILM COATED ORAL at 19:56

## 2025-06-21 RX ADMIN — MORPHINE SULFATE 2 MG: 2 INJECTION, SOLUTION INTRAMUSCULAR; INTRAVENOUS at 06:58

## 2025-06-21 RX ADMIN — INSULIN LISPRO 2 UNITS: 100 INJECTION, SOLUTION INTRAVENOUS; SUBCUTANEOUS at 12:10

## 2025-06-21 RX ADMIN — MIDODRINE HYDROCHLORIDE 10 MG: 10 TABLET ORAL at 17:38

## 2025-06-21 RX ADMIN — VANCOMYCIN HYDROCHLORIDE 750 MG: 1 INJECTION, POWDER, LYOPHILIZED, FOR SOLUTION INTRAVENOUS at 16:03

## 2025-06-21 RX ADMIN — SENNOSIDES, DOCUSATE SODIUM 1 TABLET: 50; 8.6 TABLET, FILM COATED ORAL at 09:29

## 2025-06-21 RX ADMIN — SODIUM CHLORIDE 500 ML: 0.9 INJECTION, SOLUTION INTRAVENOUS at 11:17

## 2025-06-21 RX ADMIN — 0.12% CHLORHEXIDINE GLUCONATE 15 ML: 1.2 RINSE ORAL at 19:56

## 2025-06-21 RX ADMIN — INSULIN LISPRO 6 UNITS: 100 INJECTION, SOLUTION INTRAVENOUS; SUBCUTANEOUS at 16:18

## 2025-06-21 RX ADMIN — HEPARIN SODIUM 5000 UNITS: 5000 INJECTION INTRAVENOUS; SUBCUTANEOUS at 17:30

## 2025-06-21 RX ADMIN — SODIUM CHLORIDE, PRESERVATIVE FREE 10 ML: 5 INJECTION INTRAVENOUS at 09:29

## 2025-06-21 RX ADMIN — DEXTROSE: 5 SOLUTION INTRAVENOUS at 13:22

## 2025-06-21 RX ADMIN — HYDROCORTISONE SODIUM SUCCINATE 100 MG: 100 INJECTION, POWDER, FOR SOLUTION INTRAMUSCULAR; INTRAVENOUS at 18:01

## 2025-06-21 ASSESSMENT — PAIN SCALES - GENERAL
PAINLEVEL_OUTOF10: 5
PAINLEVEL_OUTOF10: 4

## 2025-06-21 ASSESSMENT — PAIN DESCRIPTION - LOCATION
LOCATION: ABDOMEN
LOCATION: NECK

## 2025-06-21 ASSESSMENT — PAIN DESCRIPTION - DESCRIPTORS: DESCRIPTORS: ACHING

## 2025-06-21 ASSESSMENT — PAIN DESCRIPTION - ORIENTATION: ORIENTATION: LEFT

## 2025-06-22 ENCOUNTER — APPOINTMENT (OUTPATIENT)
Dept: GENERAL RADIOLOGY | Age: 45
DRG: 987 | End: 2025-06-22
Attending: INTERNAL MEDICINE
Payer: COMMERCIAL

## 2025-06-22 PROBLEM — D64.9 ANEMIA: Status: ACTIVE | Noted: 2025-06-22

## 2025-06-22 PROBLEM — E23.2: Status: ACTIVE | Noted: 2025-06-22

## 2025-06-22 PROBLEM — D72.825 BANDEMIA: Status: ACTIVE | Noted: 2025-06-22

## 2025-06-22 LAB
ANION GAP SERPL CALC-SCNC: 11 MEQ/L (ref 8–16)
APTT PPP: 26.5 SECONDS (ref 22–38)
BUN SERPL-MCNC: 26 MG/DL (ref 8–23)
CALCIUM SERPL-MCNC: 7.7 MG/DL (ref 8.6–10)
CHLORIDE SERPL-SCNC: 114 MEQ/L (ref 98–111)
CO2 SERPL-SCNC: 18 MEQ/L (ref 22–29)
CREAT SERPL-MCNC: 1.2 MG/DL (ref 0.5–0.9)
DEPRECATED RDW RBC AUTO: 52.8 FL (ref 35–45)
ERYTHROCYTE [DISTWIDTH] IN BLOOD BY AUTOMATED COUNT: 18.6 % (ref 11.5–14.5)
GFR SERPL CREATININE-BSD FRML MDRD: 57 ML/MIN/1.73M2
GLUCOSE BLD STRIP.AUTO-MCNC: 146 MG/DL (ref 70–108)
GLUCOSE BLD STRIP.AUTO-MCNC: 175 MG/DL (ref 70–108)
GLUCOSE BLD STRIP.AUTO-MCNC: 219 MG/DL (ref 70–108)
GLUCOSE SERPL-MCNC: 163 MG/DL (ref 74–109)
HCT VFR BLD AUTO: 23.3 % (ref 37–47)
HCT VFR BLD AUTO: 24.4 % (ref 37–47)
HCT VFR BLD AUTO: 25.8 % (ref 37–47)
HGB BLD-MCNC: 7.8 GM/DL (ref 12–16)
HGB BLD-MCNC: 8 GM/DL (ref 12–16)
HGB BLD-MCNC: 8.4 GM/DL (ref 12–16)
INR PPP: 1.06 (ref 0.85–1.13)
MCH RBC QN AUTO: 31 PG (ref 26–33)
MCHC RBC AUTO-ENTMCNC: 33.5 GM/DL (ref 32.2–35.5)
MCV RBC AUTO: 92.5 FL (ref 81–99)
PLATELET # BLD AUTO: 103 THOU/MM3 (ref 130–400)
PMV BLD AUTO: 10.6 FL (ref 9.4–12.4)
POTASSIUM SERPL-SCNC: 3.6 MEQ/L (ref 3.5–5.2)
PROTHROMBIN TIME: 12.1 SECONDS (ref 10–13.5)
RBC # BLD AUTO: 2.52 MILL/MM3 (ref 4.2–5.4)
SODIUM SERPL-SCNC: 143 MEQ/L (ref 135–145)
WBC # BLD AUTO: 9.8 THOU/MM3 (ref 4.8–10.8)

## 2025-06-22 PROCEDURE — 2580000003 HC RX 258

## 2025-06-22 PROCEDURE — 6360000002 HC RX W HCPCS

## 2025-06-22 PROCEDURE — 2500000003 HC RX 250 WO HCPCS

## 2025-06-22 PROCEDURE — 99223 1ST HOSP IP/OBS HIGH 75: CPT | Performed by: INTERNAL MEDICINE

## 2025-06-22 PROCEDURE — 85018 HEMOGLOBIN: CPT

## 2025-06-22 PROCEDURE — 2500000003 HC RX 250 WO HCPCS: Performed by: PHYSICIAN ASSISTANT

## 2025-06-22 PROCEDURE — 6370000000 HC RX 637 (ALT 250 FOR IP)

## 2025-06-22 PROCEDURE — 85027 COMPLETE CBC AUTOMATED: CPT

## 2025-06-22 PROCEDURE — 6370000000 HC RX 637 (ALT 250 FOR IP): Performed by: INTERNAL MEDICINE

## 2025-06-22 PROCEDURE — 2140000000 HC CCU INTERMEDIATE R&B

## 2025-06-22 PROCEDURE — 36415 COLL VENOUS BLD VENIPUNCTURE: CPT

## 2025-06-22 PROCEDURE — 85014 HEMATOCRIT: CPT

## 2025-06-22 PROCEDURE — 80048 BASIC METABOLIC PNL TOTAL CA: CPT

## 2025-06-22 PROCEDURE — 99232 SBSQ HOSP IP/OBS MODERATE 35: CPT | Performed by: INTERNAL MEDICINE

## 2025-06-22 PROCEDURE — 85610 PROTHROMBIN TIME: CPT

## 2025-06-22 PROCEDURE — 71045 X-RAY EXAM CHEST 1 VIEW: CPT

## 2025-06-22 PROCEDURE — 6370000000 HC RX 637 (ALT 250 FOR IP): Performed by: PHYSICIAN ASSISTANT

## 2025-06-22 PROCEDURE — 82948 REAGENT STRIP/BLOOD GLUCOSE: CPT

## 2025-06-22 PROCEDURE — 99291 CRITICAL CARE FIRST HOUR: CPT | Performed by: SURGERY

## 2025-06-22 PROCEDURE — 85730 THROMBOPLASTIN TIME PARTIAL: CPT

## 2025-06-22 RX ORDER — LACTULOSE 10 G/15ML
20 SOLUTION ORAL 2 TIMES DAILY
Status: DISPENSED | OUTPATIENT
Start: 2025-06-22 | End: 2025-06-24

## 2025-06-22 RX ADMIN — HYDROCORTISONE SODIUM SUCCINATE 100 MG: 100 INJECTION, POWDER, FOR SOLUTION INTRAMUSCULAR; INTRAVENOUS at 18:24

## 2025-06-22 RX ADMIN — PIPERACILLIN AND TAZOBACTAM 3375 MG: 3; .375 INJECTION, POWDER, FOR SOLUTION INTRAVENOUS at 18:29

## 2025-06-22 RX ADMIN — LACTULOSE 20 G: 20 SOLUTION ORAL at 09:34

## 2025-06-22 RX ADMIN — MIDODRINE HYDROCHLORIDE 10 MG: 10 TABLET ORAL at 18:33

## 2025-06-22 RX ADMIN — PIPERACILLIN AND TAZOBACTAM 3375 MG: 3; .375 INJECTION, POWDER, FOR SOLUTION INTRAVENOUS at 10:14

## 2025-06-22 RX ADMIN — HYDROCORTISONE SODIUM SUCCINATE 100 MG: 100 INJECTION, POWDER, FOR SOLUTION INTRAMUSCULAR; INTRAVENOUS at 02:08

## 2025-06-22 RX ADMIN — MIRTAZAPINE 7.5 MG: 7.5 TABLET, FILM COATED ORAL at 21:14

## 2025-06-22 RX ADMIN — FLUOXETINE HYDROCHLORIDE 40 MG: 20 CAPSULE ORAL at 09:34

## 2025-06-22 RX ADMIN — SODIUM CHLORIDE, PRESERVATIVE FREE 10 ML: 5 INJECTION INTRAVENOUS at 09:35

## 2025-06-22 RX ADMIN — SODIUM CHLORIDE, PRESERVATIVE FREE 10 ML: 5 INJECTION INTRAVENOUS at 10:31

## 2025-06-22 RX ADMIN — SODIUM CHLORIDE, PRESERVATIVE FREE 10 ML: 5 INJECTION INTRAVENOUS at 20:05

## 2025-06-22 RX ADMIN — SENNOSIDES, DOCUSATE SODIUM 1 TABLET: 50; 8.6 TABLET, FILM COATED ORAL at 09:34

## 2025-06-22 RX ADMIN — 0.12% CHLORHEXIDINE GLUCONATE 15 ML: 1.2 RINSE ORAL at 09:34

## 2025-06-22 RX ADMIN — INSULIN GLARGINE 5 UNITS: 100 INJECTION, SOLUTION SUBCUTANEOUS at 20:05

## 2025-06-22 RX ADMIN — MIDODRINE HYDROCHLORIDE 10 MG: 10 TABLET ORAL at 12:39

## 2025-06-22 RX ADMIN — HYDROCORTISONE SODIUM SUCCINATE 100 MG: 100 INJECTION, POWDER, FOR SOLUTION INTRAMUSCULAR; INTRAVENOUS at 12:47

## 2025-06-22 RX ADMIN — SODIUM CHLORIDE, PRESERVATIVE FREE 10 ML: 5 INJECTION INTRAVENOUS at 09:36

## 2025-06-22 RX ADMIN — 0.12% CHLORHEXIDINE GLUCONATE 15 ML: 1.2 RINSE ORAL at 20:05

## 2025-06-22 RX ADMIN — PIPERACILLIN AND TAZOBACTAM 3375 MG: 3; .375 INJECTION, POWDER, FOR SOLUTION INTRAVENOUS at 01:54

## 2025-06-22 RX ADMIN — MIDODRINE HYDROCHLORIDE 10 MG: 10 TABLET ORAL at 09:34

## 2025-06-22 RX ADMIN — METOPROLOL TARTRATE 12.5 MG: 25 TABLET, FILM COATED ORAL at 09:48

## 2025-06-23 ENCOUNTER — APPOINTMENT (OUTPATIENT)
Dept: GENERAL RADIOLOGY | Age: 45
DRG: 987 | End: 2025-06-23
Attending: INTERNAL MEDICINE
Payer: COMMERCIAL

## 2025-06-23 ENCOUNTER — APPOINTMENT (OUTPATIENT)
Age: 45
DRG: 987 | End: 2025-06-23
Attending: INTERNAL MEDICINE
Payer: COMMERCIAL

## 2025-06-23 LAB
ANION GAP SERPL CALC-SCNC: 13 MEQ/L (ref 8–16)
BASOPHILS ABSOLUTE: 0 THOU/MM3 (ref 0–0.1)
BASOPHILS NFR BLD AUTO: 0.1 %
BUN SERPL-MCNC: 29 MG/DL (ref 8–23)
CALCIUM SERPL-MCNC: 8.3 MG/DL (ref 8.6–10)
CHLORIDE SERPL-SCNC: 114 MEQ/L (ref 98–111)
CO2 SERPL-SCNC: 20 MEQ/L (ref 22–29)
CREAT SERPL-MCNC: 1.2 MG/DL (ref 0.5–0.9)
DEPRECATED RDW RBC AUTO: 58.1 FL (ref 35–45)
ECHO AO ASC DIAM: 2.3 CM
ECHO AO ASCENDING AORTA INDEX: 1.45 CM/M2
ECHO AV CUSP MM: 1.8 CM
ECHO AV PEAK GRADIENT: 6 MMHG
ECHO AV PEAK VELOCITY: 1.3 M/S
ECHO AV VELOCITY RATIO: 0.62
ECHO BSA: 1.49 M2
ECHO EST RA PRESSURE: 3 MMHG
ECHO LA AREA 2C: 11.5 CM2
ECHO LA AREA 4C: 9.9 CM2
ECHO LA DIAMETER INDEX: 1.64 CM/M2
ECHO LA DIAMETER: 2.6 CM
ECHO LA MAJOR AXIS: 3.8 CM
ECHO LA MINOR AXIS: 4.4 CM
ECHO LA VOL BP: 24 ML (ref 22–52)
ECHO LA VOL MOD A2C: 24 ML (ref 22–52)
ECHO LA VOL MOD A4C: 22 ML (ref 22–52)
ECHO LA VOL/BSA BIPLANE: 15 ML/M2 (ref 16–34)
ECHO LA VOLUME INDEX MOD A2C: 15 ML/M2 (ref 16–34)
ECHO LA VOLUME INDEX MOD A4C: 14 ML/M2 (ref 16–34)
ECHO LV E' LATERAL VELOCITY: 12.2 CM/S
ECHO LV E' SEPTAL VELOCITY: 9.8 CM/S
ECHO LV EF PHYSICIAN: 60 %
ECHO LV FRACTIONAL SHORTENING: 29 % (ref 28–44)
ECHO LV INTERNAL DIMENSION DIASTOLE INDEX: 2.2 CM/M2
ECHO LV INTERNAL DIMENSION DIASTOLIC: 3.5 CM (ref 3.9–5.3)
ECHO LV INTERNAL DIMENSION SYSTOLIC INDEX: 1.57 CM/M2
ECHO LV INTERNAL DIMENSION SYSTOLIC: 2.5 CM
ECHO LV ISOVOLUMETRIC RELAXATION TIME (IVRT): 56 MS
ECHO LV IVSD: 0.7 CM (ref 0.6–0.9)
ECHO LV MASS 2D: 56.9 G (ref 67–162)
ECHO LV MASS INDEX 2D: 35.8 G/M2 (ref 43–95)
ECHO LV POSTERIOR WALL DIASTOLIC: 0.6 CM (ref 0.6–0.9)
ECHO LV RELATIVE WALL THICKNESS RATIO: 0.34
ECHO LVOT PEAK GRADIENT: 2 MMHG
ECHO LVOT PEAK VELOCITY: 0.8 M/S
ECHO MV A VELOCITY: 0.63 M/S
ECHO MV E DECELERATION TIME (DT): 134 MS
ECHO MV E VELOCITY: 1.45 M/S
ECHO MV E/A RATIO: 2.3
ECHO MV E/E' LATERAL: 11.89
ECHO MV E/E' RATIO (AVERAGED): 13.34
ECHO MV E/E' SEPTAL: 14.8
ECHO MV REGURGITANT PEAK GRADIENT: 100 MMHG
ECHO MV REGURGITANT PEAK VELOCITY: 5 M/S
ECHO PULMONARY ARTERY END DIASTOLIC PRESSURE: 6 MMHG
ECHO PV MAX VELOCITY: 0.6 M/S
ECHO PV PEAK GRADIENT: 1 MMHG
ECHO PV REGURGITANT MAX VELOCITY: 1.3 M/S
ECHO RIGHT VENTRICULAR SYSTOLIC PRESSURE (RVSP): 34 MMHG
ECHO RV INTERNAL DIMENSION: 2.9 CM
ECHO RV TAPSE: 2.4 CM (ref 1.7–?)
ECHO TV E WAVE: 0.8 M/S
ECHO TV REGURGITANT MAX VELOCITY: 2.77 M/S
ECHO TV REGURGITANT PEAK GRADIENT: 31 MMHG
EOSINOPHIL NFR BLD AUTO: 0 %
EOSINOPHILS ABSOLUTE: 0 THOU/MM3 (ref 0–0.4)
ERYTHROCYTE [DISTWIDTH] IN BLOOD BY AUTOMATED COUNT: 19.8 % (ref 11.5–14.5)
FERRITIN SERPL IA-MCNC: 568 NG/ML (ref 13–150)
FUNGUS SPEC CULT: NORMAL
FUNGUS SPEC FUNGUS STN: NORMAL
GFR SERPL CREATININE-BSD FRML MDRD: 57 ML/MIN/1.73M2
GLUCOSE BLD STRIP.AUTO-MCNC: 170 MG/DL (ref 70–108)
GLUCOSE BLD STRIP.AUTO-MCNC: 237 MG/DL (ref 70–108)
GLUCOSE BLD STRIP.AUTO-MCNC: 239 MG/DL (ref 70–108)
GLUCOSE BLD STRIP.AUTO-MCNC: 295 MG/DL (ref 70–108)
GLUCOSE SERPL-MCNC: 232 MG/DL (ref 74–109)
HCT VFR BLD AUTO: 28.5 % (ref 37–47)
HGB BLD-MCNC: 9 GM/DL (ref 12–16)
IMM GRANULOCYTES # BLD AUTO: 0.45 THOU/MM3 (ref 0–0.07)
IMM GRANULOCYTES NFR BLD AUTO: 4.6 %
LYMPHOCYTES ABSOLUTE: 0.7 THOU/MM3 (ref 1–4.8)
LYMPHOCYTES NFR BLD AUTO: 7.1 %
MCH RBC QN AUTO: 30.2 PG (ref 26–33)
MCHC RBC AUTO-ENTMCNC: 31.6 GM/DL (ref 32.2–35.5)
MCV RBC AUTO: 95.6 FL (ref 81–99)
MONOCYTES ABSOLUTE: 0.6 THOU/MM3 (ref 0.4–1.3)
MONOCYTES NFR BLD AUTO: 5.7 %
NEUTROPHILS ABSOLUTE: 8.1 THOU/MM3 (ref 1.8–7.7)
NEUTROPHILS NFR BLD AUTO: 82.5 %
NRBC BLD AUTO-RTO: 0 /100 WBC
PLATELET # BLD AUTO: 134 THOU/MM3 (ref 130–400)
PMV BLD AUTO: 10.8 FL (ref 9.4–12.4)
POTASSIUM SERPL-SCNC: 3.6 MEQ/L (ref 3.5–5.2)
PROLACTIN SERPL-MCNC: 76.6 NG/ML
PROTEIN ELECTROPHORESIS, SERUM: NORMAL
RBC # BLD AUTO: 2.98 MILL/MM3 (ref 4.2–5.4)
REASON FOR REJECTION: NORMAL
REJECTED TEST: NORMAL
SODIUM SERPL-SCNC: 147 MEQ/L (ref 135–145)
T3REVERSE SERPL-MCNC: 11.1 NG/DL (ref 9–27)
WBC # BLD AUTO: 9.8 THOU/MM3 (ref 4.8–10.8)

## 2025-06-23 PROCEDURE — 6360000002 HC RX W HCPCS

## 2025-06-23 PROCEDURE — 84146 ASSAY OF PROLACTIN: CPT

## 2025-06-23 PROCEDURE — 92526 ORAL FUNCTION THERAPY: CPT

## 2025-06-23 PROCEDURE — 2500000003 HC RX 250 WO HCPCS

## 2025-06-23 PROCEDURE — 2580000003 HC RX 258

## 2025-06-23 PROCEDURE — 6370000000 HC RX 637 (ALT 250 FOR IP)

## 2025-06-23 PROCEDURE — 6370000000 HC RX 637 (ALT 250 FOR IP): Performed by: PHYSICIAN ASSISTANT

## 2025-06-23 PROCEDURE — 83001 ASSAY OF GONADOTROPIN (FSH): CPT

## 2025-06-23 PROCEDURE — 85025 COMPLETE CBC W/AUTO DIFF WBC: CPT

## 2025-06-23 PROCEDURE — 74230 X-RAY XM SWLNG FUNCJ C+: CPT

## 2025-06-23 PROCEDURE — 82948 REAGENT STRIP/BLOOD GLUCOSE: CPT

## 2025-06-23 PROCEDURE — 6370000000 HC RX 637 (ALT 250 FOR IP): Performed by: FAMILY MEDICINE

## 2025-06-23 PROCEDURE — 36415 COLL VENOUS BLD VENIPUNCTURE: CPT

## 2025-06-23 PROCEDURE — 2500000003 HC RX 250 WO HCPCS: Performed by: INTERNAL MEDICINE

## 2025-06-23 PROCEDURE — 99232 SBSQ HOSP IP/OBS MODERATE 35: CPT | Performed by: INTERNAL MEDICINE

## 2025-06-23 PROCEDURE — 80048 BASIC METABOLIC PNL TOTAL CA: CPT

## 2025-06-23 PROCEDURE — 93306 TTE W/DOPPLER COMPLETE: CPT

## 2025-06-23 PROCEDURE — 2140000000 HC CCU INTERMEDIATE R&B

## 2025-06-23 PROCEDURE — 83002 ASSAY OF GONADOTROPIN (LH): CPT

## 2025-06-23 PROCEDURE — 6370000000 HC RX 637 (ALT 250 FOR IP): Performed by: INTERNAL MEDICINE

## 2025-06-23 PROCEDURE — 93306 TTE W/DOPPLER COMPLETE: CPT | Performed by: INTERNAL MEDICINE

## 2025-06-23 PROCEDURE — 71045 X-RAY EXAM CHEST 1 VIEW: CPT

## 2025-06-23 PROCEDURE — 84305 ASSAY OF SOMATOMEDIN: CPT

## 2025-06-23 PROCEDURE — 99233 SBSQ HOSP IP/OBS HIGH 50: CPT | Performed by: FAMILY MEDICINE

## 2025-06-23 PROCEDURE — 92611 MOTION FLUOROSCOPY/SWALLOW: CPT

## 2025-06-23 PROCEDURE — 82728 ASSAY OF FERRITIN: CPT

## 2025-06-23 PROCEDURE — 2500000003 HC RX 250 WO HCPCS: Performed by: PHYSICIAN ASSISTANT

## 2025-06-23 PROCEDURE — 83003 ASSAY GROWTH HORMONE (HGH): CPT

## 2025-06-23 RX ORDER — LEVOTHYROXINE SODIUM 50 UG/1
50 TABLET ORAL DAILY
Status: DISCONTINUED | OUTPATIENT
Start: 2025-06-23 | End: 2025-06-30 | Stop reason: HOSPADM

## 2025-06-23 RX ORDER — LIDOCAINE 4 G/G
1 PATCH TOPICAL DAILY
Status: DISCONTINUED | OUTPATIENT
Start: 2025-06-23 | End: 2025-06-30 | Stop reason: HOSPADM

## 2025-06-23 RX ORDER — HYDROCORTISONE SODIUM SUCCINATE 100 MG/2ML
50 INJECTION INTRAMUSCULAR; INTRAVENOUS EVERY 12 HOURS
Status: DISCONTINUED | OUTPATIENT
Start: 2025-06-23 | End: 2025-06-28

## 2025-06-23 RX ORDER — DESMOPRESSIN ACETATE 0.1 MG/1
50 TABLET ORAL 2 TIMES DAILY
Status: DISCONTINUED | OUTPATIENT
Start: 2025-06-23 | End: 2025-06-24

## 2025-06-23 RX ADMIN — PIPERACILLIN AND TAZOBACTAM 3375 MG: 3; .375 INJECTION, POWDER, FOR SOLUTION INTRAVENOUS at 02:11

## 2025-06-23 RX ADMIN — LEVOTHYROXINE SODIUM 50 MCG: 0.05 TABLET ORAL at 18:18

## 2025-06-23 RX ADMIN — 0.12% CHLORHEXIDINE GLUCONATE 15 ML: 1.2 RINSE ORAL at 09:10

## 2025-06-23 RX ADMIN — BARIUM SULFATE 25 ML: 400 SUSPENSION ORAL at 09:56

## 2025-06-23 RX ADMIN — MIRTAZAPINE 7.5 MG: 7.5 TABLET, FILM COATED ORAL at 20:25

## 2025-06-23 RX ADMIN — BARIUM SULFATE 50 ML: 0.81 POWDER, FOR SUSPENSION ORAL at 09:56

## 2025-06-23 RX ADMIN — DESMOPRESSIN ACETATE 50 MCG: 0.1 TABLET ORAL at 12:53

## 2025-06-23 RX ADMIN — INSULIN LISPRO 4 UNITS: 100 INJECTION, SOLUTION INTRAVENOUS; SUBCUTANEOUS at 20:26

## 2025-06-23 RX ADMIN — PIPERACILLIN AND TAZOBACTAM 3375 MG: 3; .375 INJECTION, POWDER, FOR SOLUTION INTRAVENOUS at 18:20

## 2025-06-23 RX ADMIN — INSULIN LISPRO 2 UNITS: 100 INJECTION, SOLUTION INTRAVENOUS; SUBCUTANEOUS at 09:10

## 2025-06-23 RX ADMIN — HYDROCORTISONE SODIUM SUCCINATE 100 MG: 100 INJECTION, POWDER, FOR SOLUTION INTRAMUSCULAR; INTRAVENOUS at 12:53

## 2025-06-23 RX ADMIN — INSULIN LISPRO 2 UNITS: 100 INJECTION, SOLUTION INTRAVENOUS; SUBCUTANEOUS at 18:17

## 2025-06-23 RX ADMIN — SODIUM CHLORIDE, PRESERVATIVE FREE 10 ML: 5 INJECTION INTRAVENOUS at 09:13

## 2025-06-23 RX ADMIN — HYDROCORTISONE SODIUM SUCCINATE 100 MG: 100 INJECTION, POWDER, FOR SOLUTION INTRAMUSCULAR; INTRAVENOUS at 02:05

## 2025-06-23 RX ADMIN — INSULIN GLARGINE 5 UNITS: 100 INJECTION, SOLUTION SUBCUTANEOUS at 20:26

## 2025-06-23 RX ADMIN — BARIUM SULFATE 15 ML: 400 PASTE ORAL at 09:56

## 2025-06-23 RX ADMIN — SODIUM CHLORIDE, PRESERVATIVE FREE 10 ML: 5 INJECTION INTRAVENOUS at 20:26

## 2025-06-23 RX ADMIN — DESMOPRESSIN ACETATE 50 MCG: 0.1 TABLET ORAL at 20:25

## 2025-06-23 RX ADMIN — MIDODRINE HYDROCHLORIDE 10 MG: 10 TABLET ORAL at 09:10

## 2025-06-23 RX ADMIN — ACETAMINOPHEN 650 MG: 325 TABLET ORAL at 18:18

## 2025-06-23 RX ADMIN — PIPERACILLIN AND TAZOBACTAM 3375 MG: 3; .375 INJECTION, POWDER, FOR SOLUTION INTRAVENOUS at 09:15

## 2025-06-23 RX ADMIN — ACETAMINOPHEN 650 MG: 325 TABLET ORAL at 09:10

## 2025-06-23 RX ADMIN — HYDROCORTISONE SODIUM SUCCINATE 50 MG: 100 INJECTION, POWDER, FOR SOLUTION INTRAMUSCULAR; INTRAVENOUS at 22:50

## 2025-06-23 RX ADMIN — 0.12% CHLORHEXIDINE GLUCONATE 15 ML: 1.2 RINSE ORAL at 20:25

## 2025-06-23 RX ADMIN — FLUOXETINE HYDROCHLORIDE 40 MG: 20 CAPSULE ORAL at 09:11

## 2025-06-23 ASSESSMENT — PAIN DESCRIPTION - ORIENTATION
ORIENTATION: LEFT
ORIENTATION: LEFT

## 2025-06-23 ASSESSMENT — PAIN SCALES - GENERAL
PAINLEVEL_OUTOF10: 3
PAINLEVEL_OUTOF10: 3
PAINLEVEL_OUTOF10: 0
PAINLEVEL_OUTOF10: 1

## 2025-06-23 ASSESSMENT — PAIN DESCRIPTION - FREQUENCY
FREQUENCY: INTERMITTENT
FREQUENCY: INTERMITTENT

## 2025-06-23 ASSESSMENT — PAIN DESCRIPTION - LOCATION
LOCATION: ABDOMEN;FLANK
LOCATION: FLANK;ABDOMEN

## 2025-06-23 ASSESSMENT — PAIN DESCRIPTION - PAIN TYPE
TYPE: ACUTE PAIN
TYPE: ACUTE PAIN

## 2025-06-23 ASSESSMENT — PAIN - FUNCTIONAL ASSESSMENT
PAIN_FUNCTIONAL_ASSESSMENT: ACTIVITIES ARE NOT PREVENTED
PAIN_FUNCTIONAL_ASSESSMENT: ACTIVITIES ARE NOT PREVENTED

## 2025-06-23 ASSESSMENT — PAIN DESCRIPTION - DESCRIPTORS
DESCRIPTORS: SORE
DESCRIPTORS: SORE

## 2025-06-23 ASSESSMENT — PAIN DESCRIPTION - ONSET
ONSET: GRADUAL
ONSET: ON-GOING

## 2025-06-24 ENCOUNTER — APPOINTMENT (OUTPATIENT)
Dept: GENERAL RADIOLOGY | Age: 45
DRG: 987 | End: 2025-06-24
Attending: INTERNAL MEDICINE
Payer: COMMERCIAL

## 2025-06-24 PROBLEM — R94.6 ABNORMAL THYROID FUNCTION TEST: Status: ACTIVE | Noted: 2025-06-24

## 2025-06-24 PROBLEM — R13.10 DYSPHAGIA: Status: ACTIVE | Noted: 2025-06-24

## 2025-06-24 PROBLEM — D72.829 LEUKOCYTOSIS: Status: ACTIVE | Noted: 2025-06-24

## 2025-06-24 PROBLEM — R59.0 MEDIASTINAL ADENOPATHY: Status: ACTIVE | Noted: 2025-06-24

## 2025-06-24 PROBLEM — R16.1 SPLENOMEGALY: Status: ACTIVE | Noted: 2025-06-24

## 2025-06-24 PROBLEM — I69.30 HISTORY OF CVA WITH RESIDUAL DEFICIT: Status: ACTIVE | Noted: 2025-06-24

## 2025-06-24 PROBLEM — G93.41 METABOLIC ENCEPHALOPATHY: Status: ACTIVE | Noted: 2025-06-24

## 2025-06-24 PROBLEM — D69.6 THROMBOCYTOPENIA: Status: ACTIVE | Noted: 2025-06-24

## 2025-06-24 PROBLEM — D32.9 MENINGIOMA (HCC): Status: ACTIVE | Noted: 2025-06-24

## 2025-06-24 PROBLEM — R00.1 SINUS BRADYCARDIA: Status: ACTIVE | Noted: 2025-06-24

## 2025-06-24 PROBLEM — J98.11 ATELECTASIS OF LEFT LUNG: Status: ACTIVE | Noted: 2025-06-24

## 2025-06-24 PROBLEM — R91.8 PULMONARY NODULES: Status: ACTIVE | Noted: 2025-06-24

## 2025-06-24 PROBLEM — R79.89 ELEVATED LFTS: Status: ACTIVE | Noted: 2025-06-24

## 2025-06-24 PROBLEM — E87.1 HYPONATREMIA: Status: ACTIVE | Noted: 2025-06-24

## 2025-06-24 PROBLEM — E83.39 HYPOPHOSPHATEMIA: Status: ACTIVE | Noted: 2025-06-24

## 2025-06-24 PROBLEM — J94.2 HEMOTHORAX ON LEFT: Status: ACTIVE | Noted: 2025-06-24

## 2025-06-24 PROBLEM — R53.81 PHYSICAL DEBILITY: Status: ACTIVE | Noted: 2025-06-24

## 2025-06-24 PROBLEM — D62 ACUTE BLOOD LOSS ANEMIA: Status: ACTIVE | Noted: 2025-06-24

## 2025-06-24 LAB
ANION GAP SERPL CALC-SCNC: 13 MEQ/L (ref 8–16)
BACTERIA BLD AEROBE CULT: NORMAL
BACTERIA SPEC ANAEROBE CULT: NORMAL
BACTERIA SPEC BFLD CULT: NORMAL
BASOPHILS ABSOLUTE: 0 THOU/MM3 (ref 0–0.1)
BASOPHILS NFR BLD AUTO: 0.3 %
BUN SERPL-MCNC: 26 MG/DL (ref 8–23)
CALCIUM SERPL-MCNC: 7.8 MG/DL (ref 8.6–10)
CHLORIDE SERPL-SCNC: 104 MEQ/L (ref 98–111)
CO2 SERPL-SCNC: 20 MEQ/L (ref 22–29)
CREAT SERPL-MCNC: 1 MG/DL (ref 0.5–0.9)
DEPRECATED RDW RBC AUTO: 60.2 FL (ref 35–45)
EOSINOPHIL NFR BLD AUTO: 0.2 %
EOSINOPHILS ABSOLUTE: 0 THOU/MM3 (ref 0–0.4)
ERYTHROCYTE [DISTWIDTH] IN BLOOD BY AUTOMATED COUNT: 18.8 % (ref 11.5–14.5)
FOLLICLE STIMULATING HORMONE: 0.7 MIU/ML
GFR SERPL CREATININE-BSD FRML MDRD: 71 ML/MIN/1.73M2
GLUCOSE BLD STRIP.AUTO-MCNC: 252 MG/DL (ref 70–108)
GLUCOSE BLD STRIP.AUTO-MCNC: 258 MG/DL (ref 70–108)
GLUCOSE BLD STRIP.AUTO-MCNC: 271 MG/DL (ref 70–108)
GLUCOSE BLD STRIP.AUTO-MCNC: 275 MG/DL (ref 70–108)
GLUCOSE SERPL-MCNC: 275 MG/DL (ref 74–109)
GRAM STN SPEC: NORMAL
HCT VFR BLD AUTO: 25.9 % (ref 37–47)
HCT VFR BLD AUTO: 27.6 % (ref 37–47)
HCT VFR BLD AUTO: 29.5 % (ref 37–47)
HGB BLD-MCNC: 8.6 GM/DL (ref 12–16)
HGB BLD-MCNC: 8.8 GM/DL (ref 12–16)
HGB BLD-MCNC: 9.5 GM/DL (ref 12–16)
IMM GRANULOCYTES # BLD AUTO: 0.45 THOU/MM3 (ref 0–0.07)
IMM GRANULOCYTES NFR BLD AUTO: 4.7 %
IRON SATN MFR SERPL: 30 % (ref 20–50)
IRON SERPL-MCNC: 51 UG/DL (ref 37–145)
LUTEINIZING HORMONE: < 0.1 MIU/ML (ref 1.7–8.6)
LYMPHOCYTES ABSOLUTE: 0.7 THOU/MM3 (ref 1–4.8)
LYMPHOCYTES NFR BLD AUTO: 7.3 %
MCH RBC QN AUTO: 30.4 PG (ref 26–33)
MCHC RBC AUTO-ENTMCNC: 31.9 GM/DL (ref 32.2–35.5)
MCV RBC AUTO: 95.5 FL (ref 81–99)
MONOCYTES ABSOLUTE: 0.6 THOU/MM3 (ref 0.4–1.3)
MONOCYTES NFR BLD AUTO: 5.9 %
NEUTROPHILS ABSOLUTE: 7.8 THOU/MM3 (ref 1.8–7.7)
NEUTROPHILS NFR BLD AUTO: 81.6 %
NRBC BLD AUTO-RTO: 1 /100 WBC
PLATELET # BLD AUTO: 116 THOU/MM3 (ref 130–400)
PMV BLD AUTO: 10.3 FL (ref 9.4–12.4)
POTASSIUM SERPL-SCNC: 3.5 MEQ/L (ref 3.5–5.2)
RBC # BLD AUTO: 2.89 MILL/MM3 (ref 4.2–5.4)
SODIUM SERPL-SCNC: 137 MEQ/L (ref 135–145)
TIBC SERPL-MCNC: 170 UG/DL (ref 171–450)
WBC # BLD AUTO: 9.6 THOU/MM3 (ref 4.8–10.8)

## 2025-06-24 PROCEDURE — 6370000000 HC RX 637 (ALT 250 FOR IP)

## 2025-06-24 PROCEDURE — 2500000003 HC RX 250 WO HCPCS

## 2025-06-24 PROCEDURE — 6360000002 HC RX W HCPCS

## 2025-06-24 PROCEDURE — 36415 COLL VENOUS BLD VENIPUNCTURE: CPT

## 2025-06-24 PROCEDURE — 83550 IRON BINDING TEST: CPT

## 2025-06-24 PROCEDURE — 99233 SBSQ HOSP IP/OBS HIGH 50: CPT | Performed by: FAMILY MEDICINE

## 2025-06-24 PROCEDURE — 80048 BASIC METABOLIC PNL TOTAL CA: CPT

## 2025-06-24 PROCEDURE — 85018 HEMOGLOBIN: CPT

## 2025-06-24 PROCEDURE — 95717 EEG PHYS/QHP 2-12 HR W/O VID: CPT | Performed by: PSYCHIATRY & NEUROLOGY

## 2025-06-24 PROCEDURE — 99232 SBSQ HOSP IP/OBS MODERATE 35: CPT | Performed by: INTERNAL MEDICINE

## 2025-06-24 PROCEDURE — 85025 COMPLETE CBC W/AUTO DIFF WBC: CPT

## 2025-06-24 PROCEDURE — 2580000003 HC RX 258

## 2025-06-24 PROCEDURE — 6370000000 HC RX 637 (ALT 250 FOR IP): Performed by: INTERNAL MEDICINE

## 2025-06-24 PROCEDURE — 82948 REAGENT STRIP/BLOOD GLUCOSE: CPT

## 2025-06-24 PROCEDURE — 83540 ASSAY OF IRON: CPT

## 2025-06-24 PROCEDURE — 85014 HEMATOCRIT: CPT

## 2025-06-24 PROCEDURE — 71045 X-RAY EXAM CHEST 1 VIEW: CPT

## 2025-06-24 PROCEDURE — 6370000000 HC RX 637 (ALT 250 FOR IP): Performed by: PHYSICIAN ASSISTANT

## 2025-06-24 PROCEDURE — 2140000000 HC CCU INTERMEDIATE R&B

## 2025-06-24 RX ORDER — DESMOPRESSIN ACETATE 0.1 MG/1
50 TABLET ORAL DAILY
Status: DISCONTINUED | OUTPATIENT
Start: 2025-06-25 | End: 2025-06-25

## 2025-06-24 RX ADMIN — MIDODRINE HYDROCHLORIDE 10 MG: 10 TABLET ORAL at 12:13

## 2025-06-24 RX ADMIN — ACETAMINOPHEN 650 MG: 325 TABLET ORAL at 12:12

## 2025-06-24 RX ADMIN — MIRTAZAPINE 7.5 MG: 7.5 TABLET, FILM COATED ORAL at 21:07

## 2025-06-24 RX ADMIN — HYDROCORTISONE SODIUM SUCCINATE 50 MG: 100 INJECTION, POWDER, FOR SOLUTION INTRAMUSCULAR; INTRAVENOUS at 09:17

## 2025-06-24 RX ADMIN — PIPERACILLIN AND TAZOBACTAM 3375 MG: 3; .375 INJECTION, POWDER, FOR SOLUTION INTRAVENOUS at 09:24

## 2025-06-24 RX ADMIN — FLUOXETINE HYDROCHLORIDE 40 MG: 20 CAPSULE ORAL at 09:12

## 2025-06-24 RX ADMIN — POTASSIUM BICARBONATE 20 MEQ: 782 TABLET, EFFERVESCENT ORAL at 09:17

## 2025-06-24 RX ADMIN — INSULIN LISPRO 4 UNITS: 100 INJECTION, SOLUTION INTRAVENOUS; SUBCUTANEOUS at 09:11

## 2025-06-24 RX ADMIN — LEVOTHYROXINE SODIUM 50 MCG: 0.05 TABLET ORAL at 06:00

## 2025-06-24 RX ADMIN — INSULIN LISPRO 4 UNITS: 100 INJECTION, SOLUTION INTRAVENOUS; SUBCUTANEOUS at 12:19

## 2025-06-24 RX ADMIN — INSULIN LISPRO 4 UNITS: 100 INJECTION, SOLUTION INTRAVENOUS; SUBCUTANEOUS at 21:05

## 2025-06-24 RX ADMIN — HYDROCORTISONE SODIUM SUCCINATE 50 MG: 100 INJECTION, POWDER, FOR SOLUTION INTRAMUSCULAR; INTRAVENOUS at 23:23

## 2025-06-24 RX ADMIN — INSULIN LISPRO 4 UNITS: 100 INJECTION, SOLUTION INTRAVENOUS; SUBCUTANEOUS at 17:48

## 2025-06-24 RX ADMIN — PIPERACILLIN AND TAZOBACTAM 3375 MG: 3; .375 INJECTION, POWDER, FOR SOLUTION INTRAVENOUS at 01:58

## 2025-06-24 RX ADMIN — PIPERACILLIN AND TAZOBACTAM 3375 MG: 3; .375 INJECTION, POWDER, FOR SOLUTION INTRAVENOUS at 17:50

## 2025-06-24 RX ADMIN — ACETAMINOPHEN 650 MG: 325 TABLET ORAL at 06:00

## 2025-06-24 RX ADMIN — SODIUM CHLORIDE, PRESERVATIVE FREE 10 ML: 5 INJECTION INTRAVENOUS at 09:10

## 2025-06-24 RX ADMIN — SODIUM CHLORIDE, PRESERVATIVE FREE 10 ML: 5 INJECTION INTRAVENOUS at 21:05

## 2025-06-24 RX ADMIN — INSULIN GLARGINE 5 UNITS: 100 INJECTION, SOLUTION SUBCUTANEOUS at 21:05

## 2025-06-24 ASSESSMENT — PAIN SCALES - GENERAL
PAINLEVEL_OUTOF10: 3
PAINLEVEL_OUTOF10: 1
PAINLEVEL_OUTOF10: 0
PAINLEVEL_OUTOF10: 3

## 2025-06-24 ASSESSMENT — PAIN DESCRIPTION - DESCRIPTORS
DESCRIPTORS: SORE
DESCRIPTORS: SORE

## 2025-06-24 ASSESSMENT — PAIN DESCRIPTION - ORIENTATION
ORIENTATION: LEFT
ORIENTATION: LEFT

## 2025-06-24 ASSESSMENT — PAIN DESCRIPTION - LOCATION
LOCATION: FLANK;ABDOMEN
LOCATION: ABDOMEN;FLANK

## 2025-06-24 ASSESSMENT — PAIN DESCRIPTION - ONSET
ONSET: ON-GOING
ONSET: GRADUAL

## 2025-06-24 ASSESSMENT — PAIN DESCRIPTION - FREQUENCY
FREQUENCY: INTERMITTENT
FREQUENCY: CONTINUOUS

## 2025-06-24 ASSESSMENT — PAIN DESCRIPTION - PAIN TYPE
TYPE: ACUTE PAIN
TYPE: ACUTE PAIN

## 2025-06-24 NOTE — CARE COORDINATION
6/24/25, 3:11 PM EDT    DISCHARGE ON GOING EVALUATION    Radha KRISHNA [facility] day: 12  Location: [facility]/038-A Reason for admit: Acute renal failure [N17.9]  Liver cancer (HCC) [C22.9]  Hypercalcemia [E83.52]  ARF (acute renal failure) [N17.9]     Procedures:   6/16/25 Flexible fiberoptic bronchoscopy, diagnostic BAL of left lower lobe, transbronchial biopsy of left lower lobe, EBUS with biopsy of 3 lymph node stations   6/19 Left Chest tube placed  6/19 CVC [facility]  6/23 MBS: Laryngeal penetration and tracheal aspiration of thin barium.     Imaging since last note:   6/21 PCXR: Left effusion significantly decreased vs prior.   6/22 PCXR: Near complete resolution left pleural effusion.   6/23 PCXR: Stable left basilar atelectasis. Stable small left pleural effusion. No pneumothorax.  6/24 PCXR:   1. Poor inflation of the lungs. Normal heart size. Pigtail catheter projects  over the left lung base. Moderate size pleural effusion left side.  2. Moderate atelectasis/pneumonia left mid and lower lung fields.  3. Overall appearance of chest slightly improved from prior.    Barriers to Discharge: Hospitalist, Nephrology and Cardiology following. MBS done, pureed diet. Clamping chest tube. Solu-cortef iv q12hr. Zosyn iv q8hr. PEG tube with TF. DDAVP po daily.     PCP: Miriam Coppola MD  Readmission Risk Score: 16.6    Patient Goals/Plan/Treatment Preferences: Return to [facility] following.

## 2025-06-24 NOTE — PROCEDURES
EEG REPORT       Patient: Radha Yousif Age: 44 y.o.  MRN: [id number]      Referring Provider: Ana Paula Thomas    History: This is a 4 hour 30 minute scalp EEG was recorded without video- monitoring for a 44 y.o.. female  who presented with encephalopathy. This EEG was performed to evaluate for focal and epileptiform abnormalities.     Radha Yousif   Current Facility-Administered Medications   Medication Dose Route Frequency Provider Last Rate Last Admin    potassium bicarb-citric acid (EFFER-K) effervescent tablet 20 mEq  20 mEq Oral Once Hemmelgarn Betty E, DO        DESMOpressin (DDAVP) tablet 50 mcg  50 mcg Oral BID Hemmelgarn, Betty E, DO   50 mcg at 06/23/25 2025    lidocaine 4 % external patch 1 patch  1 patch TransDERmal Daily Kymberly Mckeon MD   1 patch at 06/23/25 1257    levothyroxine (SYNTHROID) tablet 50 mcg  50 mcg PEG Tube Daily Anthony Díaz MD   50 mcg at 06/24/25 0600    hydrocortisone sodium succinate PF (SOLU-CORTEF) injection 50 mg  50 mg IntraVENous Q12H Anthony Díaz MD   50 mg at 06/23/25 2250    insulin glargine (LANTUS) injection vial 5 Units  5 Units SubCUTAneous Daily Niko Whyte MD   5 Units at 06/23/25 2026    dextrose bolus 10% 125 mL  125 mL IntraVENous PRN Niko Whyte MD        Or    dextrose bolus 10% 250 mL  250 mL IntraVENous PRN Niko Whyte MD        magnesium sulfate 2000 mg in 50 mL IVPB premix  2,000 mg IntraVENous PRN Niko Whyte MD        sodium phosphate 15 mmol in sodium chloride 0.9 % 250 mL IVPB  15 mmol IntraVENous PRN Niko Whyte MD        piperacillin-tazobactam (ZOSYN) 3,375 mg in sodium chloride 0.9 % 50 mL IVPB (addEASE)  3,375 mg IntraVENous Q8H Niko Whyte MD   Stopped at 06/24/25 0607    sodium chloride flush 0.9 % injection 5-40 mL  5-40 mL IntraVENous 2 times per day Elvia Moura MD   10 mL at 06/24/25 0910    sodium chloride flush 0.9 % injection 5-40 mL  5-40 mL 
PROCEDURE NOTE  Date: 6/13/2025   Name: Radha Yousif  YOB: 1980    Procedures  EKG completed         
PROCEDURE NOTE  Date: 6/17/2025   Name: Radha Yousif  YOB: 1980    Procedures  12 lead EKG completed. Results handed to Abdoulaye TARIQ.        
PROCEDURE NOTE  Date: 6/17/2025   Name: Radha Yousif  YOB: 1980    Procedures  EKG completed         
PROCEDURE NOTE  Date: 6/19/2025   Name: Radha Yousif  YOB: 1980    CENTRAL LINE    Date/Time: 6/19/2025 2:37 AM    Performed by: Niko Whyte MD  Authorized by: Niko Whyte MD  Consent: Written consent obtained.  Consent given by: patient    Anesthesia:  Local Anesthetic: lidocaine 1% with epinephrine    Sedation:  Patient sedated: no    Preparation: skin prepped with 2% chlorhexidine  Skin prep agent dried: skin prep agent completely dried prior to procedure  Sterile barriers: all five maximum sterile barriers used - cap, mask, sterile gown, sterile gloves, and large sterile sheet  Hand hygiene: hand hygiene performed prior to central venous catheter insertion  Location details: left internal jugular  Patient position: flat  Catheter type: triple lumen  Catheter size: 7 Fr  Ultrasound guidance: yes  Sterile ultrasound techniques: sterile gel and sterile probe covers were used  Number of attempts: 2  Successful placement: yes  Post-procedure: line sutured and dressing applied  Assessment: blood return through all ports, free fluid flow, no pneumothorax on x-ray and placement verified by x-ray  Patient tolerance: patient tolerated the procedure well with no immediate complications                
PROCEDURE NOTE  Date: 6/19/2025   Name: Radha Yousif  YOB: 1980    Procedures        EKG was completed and handed to RN  
PROCEDURE NOTE  Date: 6/19/2025   Name: Radha Yousif  YOB: 1980    Procedures      PLEURAL CATHETER PLACEMENT:        Risks and benefits to the procedure were discussed.  Alternatives and their risks were discussed as well.    Indication:  Hemothorax    US Interpretation:   Large left pleural effusion    Complications:   None    Fluid:    Dark blood.  Side:    left-sided  Pleural catheter: 14 [place].    Procedure Note:  Informed consent was obtained prior to procedure. The patient was in the supine position and fluid level was identified with percussion and subsequent US.  Utilizing maximum barrier precautions, patient was prepped with chlorhexidine prep, and patient was draped with sterile glove, sterile gown, sterile OR towels, mask and hair net.  Patient received 10 ml of 1% lidocaine at entry point to the chest wall pleura. Utilizing a scalpel, a small incision was made in the skin at access point.  Utilizing Rollinger syringe, an introducer needle was placed until fluid was returned.  Utilizing a guide wire, it was placed into the thoracic cavity, and introducer needle was withdrawn.  Progressive dilatation was subsequently performed. Pleural catheter was attached to hollow stiffening device and placed into the thoracic cavity. Guide wire and dilator were removed simultaneously, with good fluid returned from the pleural catheter. The catheter was subsequently placed to the pleurevac with good fluid return.  Secondary cleansing with chlorhexidine prep.  Pleural catheter was secured to the skin with suture and subsequent chlorhexidine impregnated Tegaderm dressing placed. Blood loss was less than 5 cc.      Electronically signed by Anival Talamantes M.D.                                    
Following diagnostic maneuvers of amendable lymph node stations the scope was removed.  The patient tolerated the procedure well.     The Patient was taken to the Endoscopy Recovery area in satisfactory condition.    Attestation: I performed the procedure.    CHANTEL JAUREGUI, DO

## 2025-06-25 ENCOUNTER — APPOINTMENT (OUTPATIENT)
Dept: GENERAL RADIOLOGY | Age: 45
DRG: 987 | End: 2025-06-25
Attending: INTERNAL MEDICINE
Payer: COMMERCIAL

## 2025-06-25 LAB
ALBUMIN SERPL BCG-MCNC: 3.1 G/DL (ref 3.4–4.9)
ALP SERPL-CCNC: 150 U/L (ref 38–126)
ALT SERPL W/O P-5'-P-CCNC: 24 U/L (ref 10–35)
ANION GAP SERPL CALC-SCNC: 14 MEQ/L (ref 8–16)
AST SERPL-CCNC: 39 U/L (ref 10–35)
BASOPHILS ABSOLUTE: 0 THOU/MM3 (ref 0–0.1)
BASOPHILS NFR BLD AUTO: 0.3 %
BILIRUB CONJ SERPL-MCNC: < 0.1 MG/DL (ref 0–0.2)
BILIRUB SERPL-MCNC: 0.4 MG/DL (ref 0.3–1.2)
BUN SERPL-MCNC: 21 MG/DL (ref 8–23)
CA-I BLD ISE-SCNC: 1.13 MMOL/L (ref 1.12–1.32)
CALCIUM SERPL-MCNC: 8.2 MG/DL (ref 8.6–10)
CHLORIDE SERPL-SCNC: 107 MEQ/L (ref 98–111)
CO2 SERPL-SCNC: 21 MEQ/L (ref 22–29)
CREAT SERPL-MCNC: 0.9 MG/DL (ref 0.5–0.9)
DEPRECATED RDW RBC AUTO: 59.1 FL (ref 35–45)
EOSINOPHIL NFR BLD AUTO: 0.7 %
EOSINOPHILS ABSOLUTE: 0.1 THOU/MM3 (ref 0–0.4)
ERYTHROCYTE [DISTWIDTH] IN BLOOD BY AUTOMATED COUNT: 18.6 % (ref 11.5–14.5)
GFR SERPL CREATININE-BSD FRML MDRD: 80 ML/MIN/1.73M2
GLUCOSE BLD STRIP.AUTO-MCNC: 161 MG/DL (ref 70–108)
GLUCOSE BLD STRIP.AUTO-MCNC: 205 MG/DL (ref 70–108)
GLUCOSE BLD STRIP.AUTO-MCNC: 207 MG/DL (ref 70–108)
GLUCOSE BLD STRIP.AUTO-MCNC: 273 MG/DL (ref 70–108)
GLUCOSE SERPL-MCNC: 235 MG/DL (ref 74–109)
HCT VFR BLD AUTO: 30.3 % (ref 37–47)
HGB BLD-MCNC: 10 GM/DL (ref 12–16)
IMM GRANULOCYTES # BLD AUTO: 0.47 THOU/MM3 (ref 0–0.07)
IMM GRANULOCYTES NFR BLD AUTO: 3.2 %
LYMPHOCYTES ABSOLUTE: 0.8 THOU/MM3 (ref 1–4.8)
LYMPHOCYTES NFR BLD AUTO: 5.2 %
MAGNESIUM SERPL-MCNC: 2.1 MG/DL (ref 1.6–2.6)
MCH RBC QN AUTO: 30.6 PG (ref 26–33)
MCHC RBC AUTO-ENTMCNC: 33 GM/DL (ref 32.2–35.5)
MCV RBC AUTO: 92.7 FL (ref 81–99)
MONOCYTES ABSOLUTE: 1 THOU/MM3 (ref 0.4–1.3)
MONOCYTES NFR BLD AUTO: 6.8 %
NEUTROPHILS ABSOLUTE: 12.2 THOU/MM3 (ref 1.8–7.7)
NEUTROPHILS NFR BLD AUTO: 83.8 %
NRBC BLD AUTO-RTO: 1 /100 WBC
PHOSPHATE SERPL-MCNC: 1.4 MG/DL (ref 2.5–4.5)
PLATELET # BLD AUTO: 108 THOU/MM3 (ref 130–400)
PMV BLD AUTO: 11 FL (ref 9.4–12.4)
POTASSIUM SERPL-SCNC: 4 MEQ/L (ref 3.5–5.2)
PROT SERPL-MCNC: 5.5 G/DL (ref 6.4–8.3)
RBC # BLD AUTO: 3.27 MILL/MM3 (ref 4.2–5.4)
SODIUM SERPL-SCNC: 142 MEQ/L (ref 135–145)
T4 FREE SERPL-MCNC: 0.4 NG/DL (ref 0.92–1.68)
TSH SERPL DL<=0.05 MIU/L-ACNC: 1.4 UIU/ML (ref 0.27–4.2)
WBC # BLD AUTO: 14.6 THOU/MM3 (ref 4.8–10.8)

## 2025-06-25 PROCEDURE — 71045 X-RAY EXAM CHEST 1 VIEW: CPT

## 2025-06-25 PROCEDURE — 99233 SBSQ HOSP IP/OBS HIGH 50: CPT | Performed by: STUDENT IN AN ORGANIZED HEALTH CARE EDUCATION/TRAINING PROGRAM

## 2025-06-25 PROCEDURE — 82948 REAGENT STRIP/BLOOD GLUCOSE: CPT

## 2025-06-25 PROCEDURE — 83735 ASSAY OF MAGNESIUM: CPT

## 2025-06-25 PROCEDURE — 6360000002 HC RX W HCPCS

## 2025-06-25 PROCEDURE — 85025 COMPLETE CBC W/AUTO DIFF WBC: CPT

## 2025-06-25 PROCEDURE — 84439 ASSAY OF FREE THYROXINE: CPT

## 2025-06-25 PROCEDURE — 6370000000 HC RX 637 (ALT 250 FOR IP)

## 2025-06-25 PROCEDURE — 6370000000 HC RX 637 (ALT 250 FOR IP): Performed by: INTERNAL MEDICINE

## 2025-06-25 PROCEDURE — 80053 COMPREHEN METABOLIC PANEL: CPT

## 2025-06-25 PROCEDURE — 2140000000 HC CCU INTERMEDIATE R&B

## 2025-06-25 PROCEDURE — 2580000003 HC RX 258

## 2025-06-25 PROCEDURE — 84443 ASSAY THYROID STIM HORMONE: CPT

## 2025-06-25 PROCEDURE — 36415 COLL VENOUS BLD VENIPUNCTURE: CPT

## 2025-06-25 PROCEDURE — 84100 ASSAY OF PHOSPHORUS: CPT

## 2025-06-25 PROCEDURE — 82330 ASSAY OF CALCIUM: CPT

## 2025-06-25 PROCEDURE — 6370000000 HC RX 637 (ALT 250 FOR IP): Performed by: PHYSICIAN ASSISTANT

## 2025-06-25 PROCEDURE — 99232 SBSQ HOSP IP/OBS MODERATE 35: CPT | Performed by: INTERNAL MEDICINE

## 2025-06-25 PROCEDURE — 2500000003 HC RX 250 WO HCPCS

## 2025-06-25 PROCEDURE — 82248 BILIRUBIN DIRECT: CPT

## 2025-06-25 RX ORDER — DESMOPRESSIN ACETATE 0.1 MG/1
50 TABLET ORAL 2 TIMES DAILY
Status: DISCONTINUED | OUTPATIENT
Start: 2025-06-25 | End: 2025-06-30 | Stop reason: HOSPADM

## 2025-06-25 RX ADMIN — INSULIN GLARGINE 5 UNITS: 100 INJECTION, SOLUTION SUBCUTANEOUS at 21:27

## 2025-06-25 RX ADMIN — SODIUM CHLORIDE, PRESERVATIVE FREE 10 ML: 5 INJECTION INTRAVENOUS at 21:27

## 2025-06-25 RX ADMIN — PIPERACILLIN AND TAZOBACTAM 3375 MG: 3; .375 INJECTION, POWDER, FOR SOLUTION INTRAVENOUS at 09:21

## 2025-06-25 RX ADMIN — SODIUM CHLORIDE, PRESERVATIVE FREE 10 ML: 5 INJECTION INTRAVENOUS at 09:11

## 2025-06-25 RX ADMIN — HYDROCORTISONE SODIUM SUCCINATE 50 MG: 100 INJECTION, POWDER, FOR SOLUTION INTRAMUSCULAR; INTRAVENOUS at 23:07

## 2025-06-25 RX ADMIN — FLUOXETINE HYDROCHLORIDE 40 MG: 20 CAPSULE ORAL at 09:11

## 2025-06-25 RX ADMIN — SENNOSIDES, DOCUSATE SODIUM 1 TABLET: 50; 8.6 TABLET, FILM COATED ORAL at 09:11

## 2025-06-25 RX ADMIN — MIRTAZAPINE 7.5 MG: 7.5 TABLET, FILM COATED ORAL at 21:26

## 2025-06-25 RX ADMIN — INSULIN LISPRO 2 UNITS: 100 INJECTION, SOLUTION INTRAVENOUS; SUBCUTANEOUS at 11:40

## 2025-06-25 RX ADMIN — LEVOTHYROXINE SODIUM 50 MCG: 0.05 TABLET ORAL at 05:51

## 2025-06-25 RX ADMIN — DESMOPRESSIN ACETATE 50 MCG: 0.1 TABLET ORAL at 09:11

## 2025-06-25 RX ADMIN — PIPERACILLIN AND TAZOBACTAM 3375 MG: 3; .375 INJECTION, POWDER, FOR SOLUTION INTRAVENOUS at 17:36

## 2025-06-25 RX ADMIN — INSULIN LISPRO 2 UNITS: 100 INJECTION, SOLUTION INTRAVENOUS; SUBCUTANEOUS at 21:26

## 2025-06-25 RX ADMIN — HYDROCORTISONE SODIUM SUCCINATE 50 MG: 100 INJECTION, POWDER, FOR SOLUTION INTRAMUSCULAR; INTRAVENOUS at 11:58

## 2025-06-25 RX ADMIN — MIDODRINE HYDROCHLORIDE 10 MG: 10 TABLET ORAL at 11:40

## 2025-06-25 RX ADMIN — PIPERACILLIN AND TAZOBACTAM 3375 MG: 3; .375 INJECTION, POWDER, FOR SOLUTION INTRAVENOUS at 02:49

## 2025-06-25 RX ADMIN — INSULIN LISPRO 4 UNITS: 100 INJECTION, SOLUTION INTRAVENOUS; SUBCUTANEOUS at 16:44

## 2025-06-25 RX ADMIN — MIDODRINE HYDROCHLORIDE 10 MG: 10 TABLET ORAL at 09:11

## 2025-06-25 RX ADMIN — DESMOPRESSIN ACETATE 50 MCG: 0.1 TABLET ORAL at 21:26

## 2025-06-25 RX ADMIN — MIDODRINE HYDROCHLORIDE 10 MG: 10 TABLET ORAL at 16:45

## 2025-06-26 LAB
ANION GAP SERPL CALC-SCNC: 11 MEQ/L (ref 8–16)
BUN SERPL-MCNC: 20 MG/DL (ref 8–23)
CALCIUM SERPL-MCNC: 8.6 MG/DL (ref 8.6–10)
CHLORIDE SERPL-SCNC: 105 MEQ/L (ref 98–111)
CO2 SERPL-SCNC: 23 MEQ/L (ref 22–29)
CREAT SERPL-MCNC: 0.7 MG/DL (ref 0.5–0.9)
GFR SERPL CREATININE-BSD FRML MDRD: > 90 ML/MIN/1.73M2
GH SERPL-MCNC: 0.06 NG/ML (ref 0.05–8)
GLUCOSE BLD STRIP.AUTO-MCNC: 154 MG/DL (ref 70–108)
GLUCOSE BLD STRIP.AUTO-MCNC: 167 MG/DL (ref 70–108)
GLUCOSE BLD STRIP.AUTO-MCNC: 206 MG/DL (ref 70–108)
GLUCOSE BLD STRIP.AUTO-MCNC: 217 MG/DL (ref 70–108)
GLUCOSE BLD STRIP.AUTO-MCNC: 252 MG/DL (ref 70–108)
GLUCOSE SERPL-MCNC: 203 MG/DL (ref 74–109)
IGF-I SERPL-MCNC: 24 NG/ML (ref 69–253)
IGF-I Z-SCORE SERPL: -5.4
PHOSPHATE SERPL-MCNC: 2.1 MG/DL (ref 2.5–4.5)
POTASSIUM SERPL-SCNC: 3.6 MEQ/L (ref 3.5–5.2)
SODIUM SERPL-SCNC: 139 MEQ/L (ref 135–145)

## 2025-06-26 PROCEDURE — 2580000003 HC RX 258

## 2025-06-26 PROCEDURE — 6370000000 HC RX 637 (ALT 250 FOR IP): Performed by: INTERNAL MEDICINE

## 2025-06-26 PROCEDURE — 99223 1ST HOSP IP/OBS HIGH 75: CPT

## 2025-06-26 PROCEDURE — 6370000000 HC RX 637 (ALT 250 FOR IP): Performed by: PHYSICIAN ASSISTANT

## 2025-06-26 PROCEDURE — 36415 COLL VENOUS BLD VENIPUNCTURE: CPT

## 2025-06-26 PROCEDURE — 99232 SBSQ HOSP IP/OBS MODERATE 35: CPT | Performed by: STUDENT IN AN ORGANIZED HEALTH CARE EDUCATION/TRAINING PROGRAM

## 2025-06-26 PROCEDURE — 6370000000 HC RX 637 (ALT 250 FOR IP)

## 2025-06-26 PROCEDURE — 84100 ASSAY OF PHOSPHORUS: CPT

## 2025-06-26 PROCEDURE — 99232 SBSQ HOSP IP/OBS MODERATE 35: CPT | Performed by: INTERNAL MEDICINE

## 2025-06-26 PROCEDURE — 2500000003 HC RX 250 WO HCPCS

## 2025-06-26 PROCEDURE — 6360000002 HC RX W HCPCS

## 2025-06-26 PROCEDURE — 97530 THERAPEUTIC ACTIVITIES: CPT

## 2025-06-26 PROCEDURE — 97535 SELF CARE MNGMENT TRAINING: CPT

## 2025-06-26 PROCEDURE — 80048 BASIC METABOLIC PNL TOTAL CA: CPT

## 2025-06-26 PROCEDURE — 82948 REAGENT STRIP/BLOOD GLUCOSE: CPT

## 2025-06-26 PROCEDURE — 2140000000 HC CCU INTERMEDIATE R&B

## 2025-06-26 RX ADMIN — MIRTAZAPINE 7.5 MG: 7.5 TABLET, FILM COATED ORAL at 21:16

## 2025-06-26 RX ADMIN — INSULIN LISPRO 2 UNITS: 100 INJECTION, SOLUTION INTRAVENOUS; SUBCUTANEOUS at 08:51

## 2025-06-26 RX ADMIN — INSULIN GLARGINE 5 UNITS: 100 INJECTION, SOLUTION SUBCUTANEOUS at 21:17

## 2025-06-26 RX ADMIN — HYDROCORTISONE SODIUM SUCCINATE 50 MG: 100 INJECTION, POWDER, FOR SOLUTION INTRAMUSCULAR; INTRAVENOUS at 22:09

## 2025-06-26 RX ADMIN — INSULIN LISPRO 2 UNITS: 100 INJECTION, SOLUTION INTRAVENOUS; SUBCUTANEOUS at 21:16

## 2025-06-26 RX ADMIN — INSULIN LISPRO 4 UNITS: 100 INJECTION, SOLUTION INTRAVENOUS; SUBCUTANEOUS at 17:49

## 2025-06-26 RX ADMIN — FLUOXETINE HYDROCHLORIDE 40 MG: 20 CAPSULE ORAL at 08:52

## 2025-06-26 RX ADMIN — DESMOPRESSIN ACETATE 50 MCG: 0.1 TABLET ORAL at 08:51

## 2025-06-26 RX ADMIN — MIDODRINE HYDROCHLORIDE 10 MG: 10 TABLET ORAL at 08:52

## 2025-06-26 RX ADMIN — SENNOSIDES, DOCUSATE SODIUM 1 TABLET: 50; 8.6 TABLET, FILM COATED ORAL at 08:52

## 2025-06-26 RX ADMIN — LEVOTHYROXINE SODIUM 50 MCG: 0.05 TABLET ORAL at 06:22

## 2025-06-26 RX ADMIN — SODIUM CHLORIDE, PRESERVATIVE FREE 10 ML: 5 INJECTION INTRAVENOUS at 08:52

## 2025-06-26 RX ADMIN — SODIUM PHOSPHATE, MONOBASIC, MONOHYDRATE AND SODIUM PHOSPHATE, DIBASIC, ANHYDROUS 15 MMOL: 276; 142 INJECTION, SOLUTION INTRAVENOUS at 12:51

## 2025-06-26 RX ADMIN — HYDROCORTISONE SODIUM SUCCINATE 50 MG: 100 INJECTION, POWDER, FOR SOLUTION INTRAMUSCULAR; INTRAVENOUS at 12:46

## 2025-06-26 RX ADMIN — DESMOPRESSIN ACETATE 50 MCG: 0.1 TABLET ORAL at 21:16

## 2025-06-26 RX ADMIN — SODIUM CHLORIDE, PRESERVATIVE FREE 10 ML: 5 INJECTION INTRAVENOUS at 21:17

## 2025-06-26 ASSESSMENT — PAIN SCALES - GENERAL: PAINLEVEL_OUTOF10: 0

## 2025-06-26 NOTE — CONSULTS
[facility]  Cardiology Consult      Patient:  Radha Yousif  YOB: 1980    MRN: [id number]   Acct: [id number]     Primary Care Physician: Miriam Coppola MD    REASON FOR CONSULT:   transient bradycardia       CHIEF COMPLAINT:    Fatigue     HISTORY OF PRESENT ILLNESS:    Radha Yousif is a pleasant 44 year old female patient with past medical history that includes:   Past Medical History:   Diagnosis Date    CKD (chronic kidney disease)     CVA (cerebral vascular accident) ([facility]) 2022    lt side deficits & aphasia    Depression     DM (diabetes mellitus) ([facility])     Meningioma ([facility])     OCD (obsessive compulsive disorder)     Type II or unspecified type diabetes mellitus without mention of complication, not stated as uncontrolled    Echocardiogram in 2021 revealed a preserved EF. She has h/o multiple prior strokes with residual hemiplegia and aphasia. The patient was admitted to the hospital with ADAM on CKD and hypercalcemia. Nephrology was consulted and patient required Dialysis. Kidney function has improved, Cr today is 1.2. She has undergone EBUS with LN biopsy per pulmonary medicine. On 6/18/2025, patient was transferred to ICU after she was noted to be hypotensive (required IVF and pressor support with Levophed gtt). Has h/o pleural effusion. Her hospital course was complicated by iatrogenic hemothorax. Patient underwent chest tube placement. Also, patient has required PRBC transfusion for acute blood loss anemia. Hgb 8.4. EKG on 6/19/2025 revealed NSR, nonspecific TW changes in anterolateral leads. Cardiology was consulted after patient was noted to have intermittent sinus bradycardia with heart dropping down to 30s and 40s being witnessed per ICU team. Patient denies chest pain, paroxysmal nocturnal dyspnea, palpitations, dizziness, syncope.     All labs, EKG's, diagnostic testing and images as well as cardiac cath, stress testing   were reviewed during this 
Attending Note:     Patient was seen and examined by me in floor in conjunction with neurosurgery PA/ANP. All data and imaging reviewed by myself. I agree with examination assessment and plan as documented below.   More consistent with metabolic encephalopathy.  No justification for any acute neurosurgical intervention at this time.  Seizure precaution.  Patient can be discharged when she is cleared by other medical services and she need to follow-up with neurosurgery outpatient clinic after 3 to 4 weeks with a new brain MRI.  From this time on neurosurgery team will see this patient only as needed and as long as she is in the hospital.  Please call me if you have any further question or concern regarding this patient.  Claudine Bowman MD                              [place], [place]                                          NEUROSURGICAL CONSULTATION NOTE       Radha Yousif   YOB: 1980  Account Number: [id number]   Date of Examination: 6/13/2025    ASSESSMENT:  Altered mental status with known stable meningioma    PLAN:  This is a patient known to our service having most recently followed as an outpatient in our clinic setting in December 2024 with imaging produced at that time revealing stable meningioma unchanged from prior imaging.    Appropriate imaging studies have been reviewed with the latest CT scan of the head image without contrast on 6/12/2025 revealing grossly stable meningioma with no additional acute findings.    Neurosurgery recommends MRI brain to be imaged with and without contrast rule out evolution of prior findings.    HISTORY OF PRESENT ILLNESS:  Radha Yousif is a 44 y.o. female, admitted on :6/12/2025  2:41 AM  A non-smoker tobacco a nonuser of smokeless tobacco or vaping products admits to alcohol consumption and has a medical history that includes depression, excessive compulsive disorder, type 2 diabetes mellitus, meningioma and a relatively 
Comprehensive Nutrition Assessment    Type and Reason for Visit: Initial, Positive nutrition screen, Nutrition support, Consult (Home PEG Tube)    Nutrition Recommendations/Plan:   Continue diet per provider. Per Anuj Huang of [facility] RN, patient was on a pureed diet at [facility] and tolerated it well. Noted mom was bringing in food from home that was not approved on pureed diet.   Initiate bolus tube feeding regimen of Jevity 1.5 at 300 ml x 3 bolus feeds per day (recommend 8am, 12pm, 4pm). Plan to adjust tube feeds pending diet advancement.   Initiate water flushes of 120 ml before and after each bolus feed- nephrology to adjust as needed d/t hypernatremia (tube feeds + water flushes will provide 1404 ml free water)    Home PEG tube feeding regimen: Isosource 1.5 at 250 ml x 3 bolus feeds with 50 ml water flushes before and after each bolus feed (tube feeds + water flushes were providing 873 ml free water).  Recommend obtaining HgbA1c d/t hx of T2DM and patient is not on a diabetic tube feeding formula- noted glucose levels have been WNL since admission, but tube feeds weren't running.       Malnutrition Assessment:  Malnutrition Status: No malnutrition  Context: Chronic Illness       Findings of the 6 clinical characteristics of malnutrition:  Energy Intake:  No decrease in energy intake (meets nutrition needs via PEG tube)  Weight Loss:  No weight loss     Body Fat Loss:  No body fat loss     Muscle Mass Loss:  No muscle mass loss    Fluid Accumulation:  No fluid accumulation     Strength:  Not Performed    Nutrition Assessment:    Pt. nutritionally compromised AEB inadequate oral intakes/ need for EN. At risk for further nutrition compromise r/t admitted d/t lethargy and AMS. Noted underlying medical condition (PMHx depression, OCD, T2DM, CVA with left-sided deficits and aphasia, PEG tube placed [id number]?, neurogenic bladder, meningioma).    Nutrition Related Findings:    Pt. Report/Treatments/Miscellaneous: 
Department of Internal Medicine  Interventional Pulmonary  Attending Consult Note      Reason for Consult:  ADAM, hypercalcemia and abnormal CT of the chest  Requesting Physician:  Hospitalist    CHIEF COMPLAINT:  increasing fatigue    History Obtained From:  patient, mother, chart    HISTORY OF PRESENT ILLNESS:                The patient is a 44 y.o. female presented originally on 6/12 to the [facility] increased fatigue and altered mentation. The patient was found to have significantly elevated calcium level and an ADAM with poor urinary output. The patient has a history of CVA causing aphasia and requires feeding tube due to dysphagia as well. The patient has made some progress and her mentation has returned to baseline, and her calcium level has decreased with aggressive medical treatment and thus CT scan of the chest obtained. The CT chest showed evidence of possible sarcoidosis vs. Other cause for innumerable nodules and mediastinal lymphadenopathy. The patient has denied chest pain, n,v,d or abdominal pain, no shortness of breath or cough. No other noted contributing factors/complaints. Rest of ROS systems are negative.     Past Medical History:        Diagnosis Date    Depression     OCD (obsessive compulsive disorder)     Type II or unspecified type diabetes mellitus without mention of complication, not stated as uncontrolled      Past Surgical History:        Procedure Laterality Date    WRIST SURGERY       Current Medications:    Current Facility-Administered Medications: dextrose 5 % solution, , IntraVENous, Continuous  pamidronate (AREDIA) 90 mg in sodium chloride 0.9 % 1,000 mL infusion, 90 mg, IntraVENous, Once  diatrizoate meglumine-sodium (GASTROGRAFIN) 66-10 % solution 30 mL, 30 mL, PEG Tube, ONCE PRN  [Held by provider] dexAMETHasone (DECADRON) injection 2 mg, 2 mg, IntraVENous, Q12H  lansoprazole (PREVACID SOLUTAB) disintegrating tablet 15 mg, 15 mg, Per NG tube, QAM AC  insulin lispro 
Endocrine Consult    Patient:  Radha Yousif  YOB: 1980    MRN: [id number]       Date of Service: Pt seen/examined in consultation on 6/14/2025    History Of Present Illness:      44 y.o. female who we are asked to see/evaluate by Rhys Patiño MD for medical management of hypercalcemia.  44 y.o. female who we are asked to see/evaluate by Rhys Patiño MD for medical management of adrenal insufficiency.  44-year-old female with history of hypercalcemia, type 2 diabetes mellitus, chronic kidney disease presented yesterday from outside hospital with severe hypercalcemia associated with acute on chronic kidney disease.  Patient presented at OSH with calcium level of 17 mg/dL and was in an altered mental status.  Original labs at [facility] showed creatinine of 4.1, calcium 15.3 with an elevated alkaline phosphatase.  Sodium was elevated at 158 mEq/L.  She had a low normal PTH of 17 with a normal vitamin D level of 38 ng/mL.  1, 25-dihydroxy vitamin D level was elevated at 143 pg/mL.  Cortisol level was low at 1.0 mcg/dL at 9:23 AM.  This patient has been seen in the endocrine clinic and was undergoing workup for hypercalcemia.   I saw the patient in the presence of her mother.  She is awake, alert and interactive but is responding using sign language.  Patient complains of pain in the legs.  She denies abdominal pain, nausea and vomiting.  Patient is receiving tube feeding.    Past Medical History:   Diagnosis Date    Depression     OCD (obsessive compulsive disorder)     Type II or unspecified type diabetes mellitus without mention of complication, not stated as uncontrolled        Past Surgical History:   Procedure Laterality Date    WRIST SURGERY         Social History     Tobacco Use    Smoking status: Never     Passive exposure: Yes    Smokeless tobacco: Never   Substance Use Topics    Alcohol use: Yes     Comment: rarely glass of wine maybe weekly        Family History   Problem Relation Age of 
error  
results for input(s): \"TROPONINI\" in the last 72 hours.  BNP: No results for input(s): \"BNP\" in the last 72 hours.    Old labs reviewed.       Impression and Plan:  Nonoliguric ADAM: likely due to severe volume depletion in setting of hypernatremia and hypercalcemia causing intravascular volume depletion  -aggressive IV fluid replacement -started normal saline @ 200 ml/hr and free water flushes through PEG tube at 100 ml/hr this morning.    -send UA and urine lytes  -recent renal US looked ok.      2. Severe hypercalcemia, symptomatic.  Had recent hypercalcemia in March - saw endocrine who initiated a work up. Calcium back back down to 10.2 in in April but now presented in the 17 range.  Non-PTH mediated.  Had elevated 1,25 Vitamin D previously.  Will resend work up including PTH-rp, SPEP, 1, 25 vitamin D.   -started aggressive IV fluids as above with calcitonin. Repeat labs at 11 did not show much improvement in calcium levels and because she is so symptomatic from this will dialyze her today mainly for the hypercalcemia  -of note will dialyze on 2.0 Calcium bath and will have to use 2.0K bath for that which will drop her potassium.  Will have dialysis staff run 20 meq KCL IVPB at beginning of dialysis treatment.   -she did have a head CT at outside hospital suggesting possible metastatic lesion??    Does she need brain MRI?    3. Hypernatremia from volume depletion:  on isotonic fluids for the hypercalcemia.  Free water flushes initiated through PEG at 100 ml/hr.  Will dialyze on 148 sodium bath.  Can adjust water flushes accordingly.     4. Altered mental status due to above  5. Hx CVA with aphasia and PEG tube  6. DM    Thank you for allowing me to participate in the care of this patient. Please feel free to call me if you have any questions.     Electronically signed by Betyt Figueroa DO on 6/12/25 at 6:38 AM EDT    [facility]  
multiple systems, including the CNS. Patient is not having clinical CNS manifestations at this time, and prior confusions at time of admission was due to significant electrolyte abnormalities. She has an elevated angio converting enzyme (133) which is consistent with suspected diagnosis of sarcoidosis. Has multiple pulmonary nodules.  S/p EBUS with lymph node biopsy and station 7 showing granulomas. Recommend continued workup/treatment per primary team / sarcoidosis specialist. No need for LP at this time as it will not . Continue to follow up with her primary neurologist, Dr. Cruz and neurosurgeon, Dr. Bowman  after discharge.   No further inpatient neurologic work-up at this time.  Neurology will sign off.  Please do not hesitate to call our service if there are any questions or concerns.    This patient was discussed  with Dr. Cr  and he is in agreement with the assessment and plan.    Electronically signed by Neema Logan PA-C on 6/26/2025 at 4:49 PM        
ENHANCEMENT/reading of outside images: CLINICAL INFORMATION: trauma transfer COMPARISON: No prior study. TECHNIQUE: Multiple axial 2.5 mm images of the abdomen, pelvis, and lung bases were obtained without the administration of oral or intravenous contrast material. Images were obtained at [facility] and are being interpreted at request of Dr. Joseph Davis. Computer generated sagittal and coronal images of the abdomen and pelvis were also reconstructed. ALL CT SCANS AT THIS FACILITY use dose modulation, iterative reconstruction, and/or weight-based dosing when appropriate to reduce radiation dose to as low as reasonably achievable. FINDINGS: LUNG BASES: There are multiple noncalcified nodules scattered in both lung bases. Cannot see metastatic disease. In addition, there is a multinodular infiltrative process in the left lung base which could represent atypical pneumonia or additional metastatic lesions. No effusion is seen. LIVER/GALLBLADDER: Liver unremarkable. Gallbladder unremarkable PANCREAS, SPLEEN AND ADRENAL GLANDS: Pancreas and adrenal glands unremarkable. Moderate splenomegaly. KIDNEYS: There are a few small nonobstructing calculi both kidneys. Kidneys otherwise unremarkable. BOWEL/PERITONEUM: Moderate amount of fecal material in the colon, consistent with constipation. A gastrostomy tube is present and appears to be in good position. No abnormally dilated bowel loops are seen. No free air. No free fluid in the abdomen. No abnormally enlarged para-aortic lymph nodes are seen. There appear to be a few small mesenteric lymph nodes scattered in the abdomen, consistent with mesenteric adenitis. The appendix appears normal. BONES: No evidence for acute fracture or bone destruction. PELVIS: Unremarkable. No mass lesion. No free fluid. The urinary bladder is unremarkable.     1. Moderate splenomegaly. Nonobstructing bilateral renal calculi. 2. Multiple small noncalcified nodules in both lower lung

## 2025-06-27 ENCOUNTER — APPOINTMENT (OUTPATIENT)
Dept: INTERVENTIONAL RADIOLOGY/VASCULAR | Age: 45
DRG: 987 | End: 2025-06-27
Attending: INTERNAL MEDICINE
Payer: COMMERCIAL

## 2025-06-27 ENCOUNTER — APPOINTMENT (OUTPATIENT)
Dept: GENERAL RADIOLOGY | Age: 45
DRG: 987 | End: 2025-06-27
Attending: INTERNAL MEDICINE
Payer: COMMERCIAL

## 2025-06-27 LAB
ANION GAP SERPL CALC-SCNC: 12 MEQ/L (ref 8–16)
BUN SERPL-MCNC: 20 MG/DL (ref 8–23)
CALCIUM SERPL-MCNC: 8.8 MG/DL (ref 8.6–10)
CHLORIDE SERPL-SCNC: 105 MEQ/L (ref 98–111)
CO2 SERPL-SCNC: 23 MEQ/L (ref 22–29)
CREAT SERPL-MCNC: 0.7 MG/DL (ref 0.5–0.9)
GFR SERPL CREATININE-BSD FRML MDRD: > 90 ML/MIN/1.73M2
GLUCOSE BLD STRIP.AUTO-MCNC: 130 MG/DL (ref 70–108)
GLUCOSE BLD STRIP.AUTO-MCNC: 167 MG/DL (ref 70–108)
GLUCOSE BLD STRIP.AUTO-MCNC: 263 MG/DL (ref 70–108)
GLUCOSE SERPL-MCNC: 180 MG/DL (ref 74–109)
PHOSPHATE SERPL-MCNC: 2.6 MG/DL (ref 2.5–4.5)
POTASSIUM SERPL-SCNC: 3.7 MEQ/L (ref 3.5–5.2)
SODIUM SERPL-SCNC: 140 MEQ/L (ref 135–145)
T4 FREE SERPL DIALY-MCNC: 0.7 NG/DL (ref 1.1–2.4)

## 2025-06-27 PROCEDURE — 6370000000 HC RX 637 (ALT 250 FOR IP): Performed by: INTERNAL MEDICINE

## 2025-06-27 PROCEDURE — 6360000004 HC RX CONTRAST MEDICATION

## 2025-06-27 PROCEDURE — 6370000000 HC RX 637 (ALT 250 FOR IP)

## 2025-06-27 PROCEDURE — 0D20XUZ CHANGE FEEDING DEVICE IN UPPER INTESTINAL TRACT, EXTERNAL APPROACH: ICD-10-PCS | Performed by: INTERNAL MEDICINE

## 2025-06-27 PROCEDURE — 82948 REAGENT STRIP/BLOOD GLUCOSE: CPT

## 2025-06-27 PROCEDURE — 49450 REPLACE G/C TUBE PERC: CPT

## 2025-06-27 PROCEDURE — 80048 BASIC METABOLIC PNL TOTAL CA: CPT

## 2025-06-27 PROCEDURE — 6360000002 HC RX W HCPCS: Performed by: RADIOLOGY

## 2025-06-27 PROCEDURE — 6360000002 HC RX W HCPCS

## 2025-06-27 PROCEDURE — C1769 GUIDE WIRE: HCPCS

## 2025-06-27 PROCEDURE — 2500000003 HC RX 250 WO HCPCS

## 2025-06-27 PROCEDURE — 6370000000 HC RX 637 (ALT 250 FOR IP): Performed by: PHYSICIAN ASSISTANT

## 2025-06-27 PROCEDURE — 6360000004 HC RX CONTRAST MEDICATION: Performed by: RADIOLOGY

## 2025-06-27 PROCEDURE — 84100 ASSAY OF PHOSPHORUS: CPT

## 2025-06-27 PROCEDURE — 99232 SBSQ HOSP IP/OBS MODERATE 35: CPT | Performed by: INTERNAL MEDICINE

## 2025-06-27 PROCEDURE — 36415 COLL VENOUS BLD VENIPUNCTURE: CPT

## 2025-06-27 PROCEDURE — 2140000000 HC CCU INTERMEDIATE R&B

## 2025-06-27 PROCEDURE — 49465 FLUORO EXAM OF G/COLON TUBE: CPT

## 2025-06-27 RX ORDER — FENTANYL CITRATE 50 UG/ML
INJECTION, SOLUTION INTRAMUSCULAR; INTRAVENOUS PRN
Status: COMPLETED | OUTPATIENT
Start: 2025-06-27 | End: 2025-06-27

## 2025-06-27 RX ORDER — DIATRIZOATE MEGLUMINE AND DIATRIZOATE SODIUM 660; 100 MG/ML; MG/ML
30 SOLUTION ORAL; RECTAL
Status: DISCONTINUED | OUTPATIENT
Start: 2025-06-27 | End: 2025-06-30 | Stop reason: HOSPADM

## 2025-06-27 RX ORDER — MIDAZOLAM HYDROCHLORIDE 1 MG/ML
INJECTION, SOLUTION INTRAMUSCULAR; INTRAVENOUS PRN
Status: COMPLETED | OUTPATIENT
Start: 2025-06-27 | End: 2025-06-27

## 2025-06-27 RX ORDER — LIDOCAINE HYDROCHLORIDE 20 MG/ML
INJECTION, SOLUTION EPIDURAL; INFILTRATION; INTRACAUDAL; PERINEURAL PRN
Status: COMPLETED | OUTPATIENT
Start: 2025-06-27 | End: 2025-06-27

## 2025-06-27 RX ORDER — DIATRIZOATE MEGLUMINE AND DIATRIZOATE SODIUM 660; 100 MG/ML; MG/ML
SOLUTION ORAL; RECTAL PRN
Status: COMPLETED | OUTPATIENT
Start: 2025-06-27 | End: 2025-06-27

## 2025-06-27 RX ADMIN — INSULIN LISPRO 4 UNITS: 100 INJECTION, SOLUTION INTRAVENOUS; SUBCUTANEOUS at 21:17

## 2025-06-27 RX ADMIN — LIDOCAINE HYDROCHLORIDE 5 ML: 20 INJECTION, SOLUTION EPIDURAL; INFILTRATION; INTRACAUDAL; PERINEURAL at 16:56

## 2025-06-27 RX ADMIN — LEVOTHYROXINE SODIUM 50 MCG: 0.05 TABLET ORAL at 09:14

## 2025-06-27 RX ADMIN — DESMOPRESSIN ACETATE 50 MCG: 0.1 TABLET ORAL at 09:14

## 2025-06-27 RX ADMIN — SODIUM CHLORIDE, PRESERVATIVE FREE 10 ML: 5 INJECTION INTRAVENOUS at 09:21

## 2025-06-27 RX ADMIN — MIRTAZAPINE 7.5 MG: 7.5 TABLET, FILM COATED ORAL at 21:18

## 2025-06-27 RX ADMIN — HYDROCORTISONE SODIUM SUCCINATE 50 MG: 100 INJECTION, POWDER, FOR SOLUTION INTRAMUSCULAR; INTRAVENOUS at 11:44

## 2025-06-27 RX ADMIN — FENTANYL CITRATE 50 MCG: 50 INJECTION, SOLUTION INTRAMUSCULAR; INTRAVENOUS at 16:42

## 2025-06-27 RX ADMIN — DIATRIZOATE MEGLUMINE AND DIATRIZOATE SODIUM 15 ML: 600; 100 SOLUTION ORAL; RECTAL at 16:56

## 2025-06-27 RX ADMIN — DIATRIZOATE MEGLUMINE AND DIATRIZOATE SODIUM 30 ML: 600; 100 SOLUTION ORAL; RECTAL at 04:45

## 2025-06-27 RX ADMIN — SENNOSIDES, DOCUSATE SODIUM 1 TABLET: 50; 8.6 TABLET, FILM COATED ORAL at 09:14

## 2025-06-27 RX ADMIN — DESMOPRESSIN ACETATE 50 MCG: 0.1 TABLET ORAL at 21:18

## 2025-06-27 RX ADMIN — FLUOXETINE HYDROCHLORIDE 40 MG: 20 CAPSULE ORAL at 09:14

## 2025-06-27 RX ADMIN — HYDROCORTISONE SODIUM SUCCINATE 50 MG: 100 INJECTION, POWDER, FOR SOLUTION INTRAMUSCULAR; INTRAVENOUS at 23:19

## 2025-06-27 RX ADMIN — MIDAZOLAM 1 MG: 1 INJECTION INTRAMUSCULAR; INTRAVENOUS at 16:42

## 2025-06-27 RX ADMIN — INSULIN GLARGINE 5 UNITS: 100 INJECTION, SOLUTION SUBCUTANEOUS at 21:17

## 2025-06-27 NOTE — H&P
Formulation and discussion of sedation / procedure plans, risks, benefits, side effects and alternatives with patient and/or responsible adult completed.    History and Physical reviewed and unchanged.    Electronically signed by Dylan Choudhary MD on 6/27/25 at 4:52 PM EDT

## 2025-06-27 NOTE — H&P
[facility]  Sedation/Analgesia History & Physical    Pt Name: Radha Yousif  MRN: [id number]  YOB: 1980  Provider Performing Procedure: Dylan Choudhary MD, MD  Primary Care Physician: Miriam Coppola MD    Formulation and discussion of sedation / procedure plans, risks, benefits, side effects and alternatives with patient and/or responsible adult completed.    PRE-PROCEDURE   DNR-CCA/DNR-CC []Yes [x]No  Brief History/Pre-Procedure Diagnosis: \strokes, dysphagia           MEDICAL HISTORY  []CAD/Valve  []Liver Disease  []Lung Disease []Diabetes  []Hypertension []Renal Disease  []Additional information:       has a past medical history of CKD (chronic kidney disease), CVA (cerebral vascular accident) (HCC), Depression, DM (diabetes mellitus) (HCC), Meningioma (HCC), OCD (obsessive compulsive disorder), and Type II or unspecified type diabetes mellitus without mention of complication, not stated as uncontrolled.    SURGICAL HISTORY   has a past surgical history that includes Wrist surgery; bronchoscopy (6/16/2025); and bronchoscopy (6/16/2025).  Additional information:       ALLERGIES   Allergies as of 06/11/2025 - Fully Reviewed 05/20/2025   Allergen Reaction Noted    Dust mite extract  07/30/2013    Molds & smuts  07/30/2013    Ranitidine hcl  07/30/2013     Additional information:       MEDICATIONS   Coumadin Use Last 5 Days [x]No []Yes  Antiplatelet drug therapy use last 5 days  [x]No []Yes  Other anticoagulant use last 5 days  [x]No []Yes    Current Facility-Administered Medications:     diatrizoate meglumine-sodium (GASTROGRAFIN) 66-10 % solution 30 mL, 30 mL, PEG Tube, ONCE PRN, Salena Caceres, APRN - CNP, 30 mL at 06/27/25 0445    fentaNYL (SUBLIMAZE) injection, , , PRN, Dylan Choudhary MD, 50 mcg at 06/27/25 1642    midazolam (VERSED) injection, , IntraVENous, PRN, Dylan Choudhary MD, 1 mg at 06/27/25 1642    DESMOpressin (DDAVP) tablet 50 mcg, 50 mcg, Oral, BID, Henry

## 2025-06-27 NOTE — OP NOTE
Department of Radiology  Post Procedure Progress Note      Pre-Procedure Diagnosis:  strokes , dysphagia     Procedure Performed:  G tube exchange     Anesthesia: local / versed and fentanyl    Findings: successful    Immediate Complications:  None    Estimated Blood Loss: minimal    SEE DICTATED PROCEDURE NOTE FOR COMPLETE DETAILS.    Dylan Choudhary MD   6/27/2025 4:53 PM

## 2025-06-28 LAB
ANION GAP SERPL CALC-SCNC: 8 MEQ/L (ref 8–16)
BUN SERPL-MCNC: 19 MG/DL (ref 8–23)
CALCIUM SERPL-MCNC: 8.1 MG/DL (ref 8.6–10)
CHLORIDE SERPL-SCNC: 104 MEQ/L (ref 98–111)
CO2 SERPL-SCNC: 27 MEQ/L (ref 22–29)
CREAT SERPL-MCNC: 0.7 MG/DL (ref 0.5–0.9)
GFR SERPL CREATININE-BSD FRML MDRD: > 90 ML/MIN/1.73M2
GLUCOSE BLD STRIP.AUTO-MCNC: 119 MG/DL (ref 70–108)
GLUCOSE BLD STRIP.AUTO-MCNC: 305 MG/DL (ref 70–108)
GLUCOSE BLD STRIP.AUTO-MCNC: 89 MG/DL (ref 70–108)
GLUCOSE SERPL-MCNC: 85 MG/DL (ref 74–109)
POTASSIUM SERPL-SCNC: 3.7 MEQ/L (ref 3.5–5.2)
SODIUM SERPL-SCNC: 139 MEQ/L (ref 135–145)

## 2025-06-28 PROCEDURE — 6370000000 HC RX 637 (ALT 250 FOR IP): Performed by: INTERNAL MEDICINE

## 2025-06-28 PROCEDURE — 6370000000 HC RX 637 (ALT 250 FOR IP)

## 2025-06-28 PROCEDURE — 99232 SBSQ HOSP IP/OBS MODERATE 35: CPT | Performed by: INTERNAL MEDICINE

## 2025-06-28 PROCEDURE — 80048 BASIC METABOLIC PNL TOTAL CA: CPT

## 2025-06-28 PROCEDURE — 36415 COLL VENOUS BLD VENIPUNCTURE: CPT

## 2025-06-28 PROCEDURE — 2140000000 HC CCU INTERMEDIATE R&B

## 2025-06-28 PROCEDURE — 82948 REAGENT STRIP/BLOOD GLUCOSE: CPT

## 2025-06-28 PROCEDURE — 2500000003 HC RX 250 WO HCPCS

## 2025-06-28 PROCEDURE — 6370000000 HC RX 637 (ALT 250 FOR IP): Performed by: PHYSICIAN ASSISTANT

## 2025-06-28 RX ORDER — HYDROCORTISONE SODIUM SUCCINATE 100 MG/2ML
25 INJECTION INTRAMUSCULAR; INTRAVENOUS EVERY 12 HOURS
Status: DISCONTINUED | OUTPATIENT
Start: 2025-06-28 | End: 2025-06-28

## 2025-06-28 RX ADMIN — DESMOPRESSIN ACETATE 50 MCG: 0.1 TABLET ORAL at 20:14

## 2025-06-28 RX ADMIN — SODIUM CHLORIDE, PRESERVATIVE FREE 10 ML: 5 INJECTION INTRAVENOUS at 20:12

## 2025-06-28 RX ADMIN — FLUOXETINE HYDROCHLORIDE 40 MG: 20 CAPSULE ORAL at 09:32

## 2025-06-28 RX ADMIN — INSULIN GLARGINE 5 UNITS: 100 INJECTION, SOLUTION SUBCUTANEOUS at 20:28

## 2025-06-28 RX ADMIN — HYDROCORTISONE 25 MG: 10 TABLET ORAL at 20:15

## 2025-06-28 RX ADMIN — 0.12% CHLORHEXIDINE GLUCONATE 15 ML: 1.2 RINSE ORAL at 20:28

## 2025-06-28 RX ADMIN — DESMOPRESSIN ACETATE 50 MCG: 0.1 TABLET ORAL at 09:32

## 2025-06-28 RX ADMIN — LEVOTHYROXINE SODIUM 50 MCG: 0.05 TABLET ORAL at 06:32

## 2025-06-28 RX ADMIN — HYDROCORTISONE 25 MG: 10 TABLET ORAL at 11:37

## 2025-06-28 RX ADMIN — MIRTAZAPINE 7.5 MG: 7.5 TABLET, FILM COATED ORAL at 20:14

## 2025-06-28 RX ADMIN — INSULIN LISPRO 6 UNITS: 100 INJECTION, SOLUTION INTRAVENOUS; SUBCUTANEOUS at 20:28

## 2025-06-28 NOTE — DISCHARGE INSTRUCTIONS
Referral made to sarcoidosis specialist, Dr. Edgar Camarillo, Sarcoid specialist at [facility]: [phone] or [facility] Sarcoidosis clinic: [phone] if [facility] not available.   Follow-up with endocrinology, Dr. Patton in 2 to 4 weeks.  Follow-up with your primary care provider (PCP) in 1 to 2 weeks.   Glucerna 1.5 at 45 mL/hr x 23hrs 100ml Water Flush q4hrs  Holding 30 mins before and after synthroid administration

## 2025-06-29 PROBLEM — E23.2 PRIMARY CENTRAL DIABETES INSIPIDUS: Status: ACTIVE | Noted: 2025-06-29

## 2025-06-29 LAB
GLUCOSE BLD STRIP.AUTO-MCNC: 154 MG/DL (ref 70–108)
GLUCOSE BLD STRIP.AUTO-MCNC: 202 MG/DL (ref 70–108)
GLUCOSE BLD STRIP.AUTO-MCNC: 226 MG/DL (ref 70–108)
GLUCOSE BLD STRIP.AUTO-MCNC: 264 MG/DL (ref 70–108)
T4 FREE SERPL DIALY-MCNC: 0.8 NG/DL (ref 1.1–2.4)

## 2025-06-29 PROCEDURE — 6370000000 HC RX 637 (ALT 250 FOR IP): Performed by: INTERNAL MEDICINE

## 2025-06-29 PROCEDURE — 6370000000 HC RX 637 (ALT 250 FOR IP)

## 2025-06-29 PROCEDURE — 2140000000 HC CCU INTERMEDIATE R&B

## 2025-06-29 PROCEDURE — 99239 HOSP IP/OBS DSCHRG MGMT >30: CPT | Performed by: INTERNAL MEDICINE

## 2025-06-29 PROCEDURE — 6370000000 HC RX 637 (ALT 250 FOR IP): Performed by: PHYSICIAN ASSISTANT

## 2025-06-29 PROCEDURE — 2500000003 HC RX 250 WO HCPCS

## 2025-06-29 PROCEDURE — 82948 REAGENT STRIP/BLOOD GLUCOSE: CPT

## 2025-06-29 RX ORDER — POLYETHYLENE GLYCOL 3350 17 G/17G
17 POWDER, FOR SOLUTION ORAL DAILY PRN
DISCHARGE
Start: 2025-06-29 | End: 2025-07-29

## 2025-06-29 RX ORDER — LIDOCAINE 4 G/G
1 PATCH TOPICAL DAILY
DISCHARGE
Start: 2025-06-30

## 2025-06-29 RX ORDER — LEVOTHYROXINE SODIUM 50 UG/1
50 TABLET ORAL DAILY
DISCHARGE
Start: 2025-06-30

## 2025-06-29 RX ORDER — HYDROCORTISONE 10 MG/1
10 TABLET ORAL 2 TIMES DAILY
DISCHARGE
Start: 2025-06-29

## 2025-06-29 RX ORDER — MIDODRINE HYDROCHLORIDE 10 MG/1
10 TABLET ORAL
DISCHARGE
Start: 2025-06-29

## 2025-06-29 RX ORDER — DESMOPRESSIN ACETATE 0.1 MG/1
0.05 TABLET ORAL 2 TIMES DAILY
Refills: 0 | DISCHARGE
Start: 2025-06-29

## 2025-06-29 RX ORDER — CHLORHEXIDINE GLUCONATE ORAL RINSE 1.2 MG/ML
15 SOLUTION DENTAL 2 TIMES DAILY
Qty: 420 ML | Refills: 0
Start: 2025-06-29 | End: 2025-07-13

## 2025-06-29 RX ADMIN — 0.12% CHLORHEXIDINE GLUCONATE 15 ML: 1.2 RINSE ORAL at 20:30

## 2025-06-29 RX ADMIN — MIRTAZAPINE 7.5 MG: 7.5 TABLET, FILM COATED ORAL at 20:32

## 2025-06-29 RX ADMIN — LEVOTHYROXINE SODIUM 50 MCG: 0.05 TABLET ORAL at 06:24

## 2025-06-29 RX ADMIN — HYDROCORTISONE 25 MG: 10 TABLET ORAL at 10:11

## 2025-06-29 RX ADMIN — SODIUM CHLORIDE, PRESERVATIVE FREE 10 ML: 5 INJECTION INTRAVENOUS at 10:11

## 2025-06-29 RX ADMIN — INSULIN GLARGINE 5 UNITS: 100 INJECTION, SOLUTION SUBCUTANEOUS at 20:30

## 2025-06-29 RX ADMIN — DESMOPRESSIN ACETATE 50 MCG: 0.1 TABLET ORAL at 10:10

## 2025-06-29 RX ADMIN — INSULIN LISPRO 2 UNITS: 100 INJECTION, SOLUTION INTRAVENOUS; SUBCUTANEOUS at 13:32

## 2025-06-29 RX ADMIN — FLUOXETINE HYDROCHLORIDE 40 MG: 20 CAPSULE ORAL at 10:10

## 2025-06-29 RX ADMIN — SODIUM CHLORIDE, PRESERVATIVE FREE 10 ML: 5 INJECTION INTRAVENOUS at 20:31

## 2025-06-29 RX ADMIN — INSULIN LISPRO 4 UNITS: 100 INJECTION, SOLUTION INTRAVENOUS; SUBCUTANEOUS at 20:30

## 2025-06-29 RX ADMIN — INSULIN LISPRO 2 UNITS: 100 INJECTION, SOLUTION INTRAVENOUS; SUBCUTANEOUS at 18:06

## 2025-06-29 RX ADMIN — DESMOPRESSIN ACETATE 50 MCG: 0.1 TABLET ORAL at 20:32

## 2025-06-29 RX ADMIN — HYDROCORTISONE 25 MG: 10 TABLET ORAL at 20:32

## 2025-06-29 NOTE — DISCHARGE SUMMARY
Bandemia    Anemia    Diabetes insipidus    Hemothorax on left    Acute blood loss anemia    Atelectasis of left lung    Hyponatremia    Leukocytosis    Sinus bradycardia    Hypophosphatemia    Pulmonary nodules    Mediastinal adenopathy    Metabolic encephalopathy    Elevated LFTs    Abnormal thyroid function test    Dysphagia    History of CVA with residual deficit    Meningioma (HCC)    Splenomegaly    Physical debility    Thrombocytopenia    Primary central diabetes insipidus  Resolved Problems:    * No resolved hospital problems. *  Hx CVA with asphasia and PEG tube.  Left lung hemothorax s/p chest tube   Central diabetes insipidus: presented with hypernatrermia and polyuria.   S/p Pamidronate 90 mg on 6/16. Calcium has normalized.   ADAM on CKD, improved. s/p dialysis.   H/o meningioma. Evaluated by neurosurg. No intervention planned.  Normal PTH of 17 on 6/12/2025.  PTH related peptide 6.0 H on 6/12/2025  Vitamin D, 1, 25 dihyd 143 H on 6/12/25.  Normal iron saturation of 30 on 6/24/25  Low free T3 of 1.15 on 6/19/2025  Low free T4 of 0.4 on 6/25/2025  Low free T4 by dialysis of 0.8 on 6/25/25  Adrenal insufficiency  G tube exchange 6/27/2025      Radha Yousif is a 44 y.o.  female who presents with No chief complaint on file.      Hospital Course:     History Of Present Illness:    Radha Yousif is a 44 y.o. female with a history of multiple CVAs with resultant left side paraplegia, aphasia, and dysphagia status post PEG tube, hypertension, hyperlipidemia, type 2 diabetes originally admitted 6/12/2025 for ADAM on CKD with hypercalcemia.  Since admission, patient has had dialysis catheter placed by IR and undergone dialysis with nephrology and since had dialysis catheter removed.  Neurosurgery has seen for stable meningioma and will only follow peripherally at this time.  Pulmonology and endocrinology have been working up her hypercalcemia, including bronc with biopsy left lower lobe, EBUS with biopsy

## 2025-06-30 VITALS
RESPIRATION RATE: 18 BRPM | HEART RATE: 99 BPM | BODY MASS INDEX: 26.62 KG/M2 | OXYGEN SATURATION: 95 % | WEIGHT: 135.58 LBS | TEMPERATURE: 98.8 F | HEIGHT: 60 IN | DIASTOLIC BLOOD PRESSURE: 99 MMHG | SYSTOLIC BLOOD PRESSURE: 156 MMHG

## 2025-06-30 LAB
AFB CULTURE & SMEAR: NORMAL
GLUCOSE BLD STRIP.AUTO-MCNC: 169 MG/DL (ref 70–108)

## 2025-06-30 PROCEDURE — 6370000000 HC RX 637 (ALT 250 FOR IP): Performed by: INTERNAL MEDICINE

## 2025-06-30 PROCEDURE — 82948 REAGENT STRIP/BLOOD GLUCOSE: CPT

## 2025-06-30 PROCEDURE — 6370000000 HC RX 637 (ALT 250 FOR IP): Performed by: PHYSICIAN ASSISTANT

## 2025-06-30 PROCEDURE — 99232 SBSQ HOSP IP/OBS MODERATE 35: CPT | Performed by: INTERNAL MEDICINE

## 2025-06-30 PROCEDURE — 92526 ORAL FUNCTION THERAPY: CPT

## 2025-06-30 PROCEDURE — 6370000000 HC RX 637 (ALT 250 FOR IP)

## 2025-06-30 PROCEDURE — 2500000003 HC RX 250 WO HCPCS

## 2025-06-30 RX ORDER — AMLODIPINE BESYLATE 5 MG/1
5 TABLET ORAL DAILY
Status: DISCONTINUED | OUTPATIENT
Start: 2025-06-30 | End: 2025-06-30 | Stop reason: HOSPADM

## 2025-06-30 RX ORDER — AMLODIPINE BESYLATE 5 MG/1
5 TABLET ORAL DAILY
DISCHARGE
Start: 2025-06-30

## 2025-06-30 RX ADMIN — 0.12% CHLORHEXIDINE GLUCONATE 15 ML: 1.2 RINSE ORAL at 10:11

## 2025-06-30 RX ADMIN — SODIUM CHLORIDE, PRESERVATIVE FREE 10 ML: 5 INJECTION INTRAVENOUS at 10:21

## 2025-06-30 RX ADMIN — FLUOXETINE HYDROCHLORIDE 40 MG: 20 CAPSULE ORAL at 10:11

## 2025-06-30 RX ADMIN — AMLODIPINE BESYLATE 5 MG: 5 TABLET ORAL at 10:11

## 2025-06-30 RX ADMIN — DESMOPRESSIN ACETATE 50 MCG: 0.1 TABLET ORAL at 10:11

## 2025-06-30 RX ADMIN — LEVOTHYROXINE SODIUM 50 MCG: 0.05 TABLET ORAL at 05:20

## 2025-06-30 RX ADMIN — HYDROCORTISONE 25 MG: 10 TABLET ORAL at 10:11

## 2025-06-30 NOTE — PROGRESS NOTES
Endocrine progress Note    Patient:  Radha Yousif      Unit/Bed:3B-38/038-A    YOB: 1980    MRN: [id number]     Acct: [id number]     Admit date: 6/12/2025    HPI: 44-year-old female with a history of type 2 diabetes mellitus being followed for hypercalcemia, adrenal insufficiency, abnormal thyroid function test and DI.  She has very restful night and has been having some pudding.  She is having a mild productive cough but denies shortness of breath and wheezing.  No abdominal pain, nausea or vomiting.  I saw the patient in the presence of her mother.    Past Medical History:   Diagnosis Date    CKD (chronic kidney disease)     CVA (cerebral vascular accident) ([facility]) 2022    lt side deficits & aphasia    Depression     DM (diabetes mellitus) ([facility])     Meningioma ([facility])     OCD (obsessive compulsive disorder)     Type II or unspecified type diabetes mellitus without mention of complication, not stated as uncontrolled          Diet:  ADULT DIET; Dysphagia - Pureed; Thin liquids via straw  ADULT TUBE FEEDING; PEG; Diabetic; Continuous; 25; Yes; 10; Q 4 hours; 45; 100; Q 4 hours    Medications:  Scheduled Meds:   hydrocortisone  25 mg Oral BID    DESMOpressin  50 mcg Oral BID    lidocaine  1 patch TransDERmal Daily    levothyroxine  50 mcg PEG Tube Daily    insulin glargine  5 Units SubCUTAneous Daily    sodium chloride flush  5-40 mL IntraVENous 2 times per day    insulin lispro  0-8 Units SubCUTAneous 4x Daily AC & HS    [Held by provider] heparin (porcine)  5,000 Units SubCUTAneous 3 times per day    chlorhexidine  15 mL Mouth/Throat BID    [Held by provider] ferrous sulfate  325 mg Oral BID WC    FLUoxetine  40 mg Oral Daily    midodrine  10 mg Per G Tube TID     mirtazapine  7.5 mg Per G Tube Nightly    sennosides-docusate sodium  1 tablet Per G Tube BID     Continuous Infusions:   sodium chloride      dextrose       PRN Meds:diatrizoate meglumine-sodium, dextrose bolus **OR** dextrose 
    Endocrine progress Note    Patient:  Radha Yousif      Unit/Bed:3B-38/038-A    YOB: 1980    MRN: [id number]     Acct: [id number]     Admit date: 6/12/2025    HPI: 44-year-old female with a history of type 2 diabetes mellitus being followed for hypercalcemia and adrenal insufficiency.  She is status post chest tube placement but this has been clamped and she is comfortable.  Her mother is at the bedside.  Patient communicates only using sign language but she is awake and alert and appropriate.  Shortness of breath.  Denies abdominal pain, nausea and vomiting.  No fevers or chills.  I have reviewed pulmonary and nephrology documentation.  I have also reviewed hospitalist notes.  Labs medications have been reviewed.    Past Medical History:   Diagnosis Date    CKD (chronic kidney disease)     CVA (cerebral vascular accident) ([facility]) 2022    lt side deficits & aphasia    Depression     DM (diabetes mellitus) ([facility])     Meningioma ([facility])     OCD (obsessive compulsive disorder)     Type II or unspecified type diabetes mellitus without mention of complication, not stated as uncontrolled          Diet:  ADULT DIET; Dysphagia - Pureed; Thin liquids via straw  ADULT TUBE FEEDING; PEG; Diabetic; Continuous; 40; No; 100; Q 4 hours    Medications:  Scheduled Meds:   [START ON 6/25/2025] DESMOpressin  50 mcg Oral Daily    lidocaine  1 patch TransDERmal Daily    levothyroxine  50 mcg PEG Tube Daily    hydrocortisone sodium succinate PF  50 mg IntraVENous Q12H    insulin glargine  5 Units SubCUTAneous Daily    piperacillin-tazobactam  3,375 mg IntraVENous Q8H    sodium chloride flush  5-40 mL IntraVENous 2 times per day    insulin lispro  0-8 Units SubCUTAneous 4x Daily AC & HS    [Held by provider] heparin (porcine)  5,000 Units SubCUTAneous 3 times per day    chlorhexidine  15 mL Mouth/Throat BID    [Held by provider] ferrous sulfate  325 mg Oral BID WC    FLUoxetine  40 mg Oral Daily    midodrine  10 mg Per 
    Endocrine progress Note    Patient:  Radha Yousif      Unit/Bed:6A-15/015-A    YOB: 1980    MRN: [id number]     Acct: [id number]     Admit date: 6/12/2025    HPI: Visit for this 44-year-old female being seen for adrenal insufficiency and hypercalcemia.  She underwent bronchoscopy this morning and is having some discomfort in her throat.  The patient is able to communicate by sign language.  She denies fevers and chills.  She denies shortness of breath.  Patient is currently maintained on Decadron 2 mg every 12 hours with stable blood pressure.  She is maintaining reasonable urine output.  Blood sugars are slightly elevated.  I have reviewed BMP, cortisol levels from ACTH stimulation test.    Past Medical History:   Diagnosis Date    Depression     OCD (obsessive compulsive disorder)     Type II or unspecified type diabetes mellitus without mention of complication, not stated as uncontrolled        Diet:  Diet NPO  ADULT TUBE FEEDING; PEG; Standard with Fiber; Bolus; 3 Times Daily; 300; 150; Q 1 hour    Medications:  Scheduled Meds:   [Held by provider] dexAMETHasone  2 mg IntraVENous Q12H    lansoprazole  15 mg Per NG tube QAM AC    insulin lispro  0-16 Units SubCUTAneous 4x Daily AC & HS    insulin glargine  15 Units SubCUTAneous Nightly    sodium chloride flush  10 mL IntraVENous 2 times per day    heparin (porcine)  5,000 Units SubCUTAneous 3 times per day    chlorhexidine  15 mL Mouth/Throat BID    [Held by provider] ferrous sulfate  325 mg Oral BID WC    FLUoxetine  40 mg Oral Daily    metoprolol tartrate  12.5 mg Per G Tube BID    midodrine  10 mg Per G Tube TID WC    mirtazapine  7.5 mg Per G Tube Nightly    sennosides-docusate sodium  1 tablet Per G Tube BID     Continuous Infusions:   dextrose 150 mL/hr at 06/16/25 2321    sodium chloride      dextrose       PRN Meds:diatrizoate meglumine-sodium, sodium chloride flush, sodium chloride, ondansetron **OR** ondansetron, polyethylene 
    Hospitalist Progress Note      Patient: Radha Yousif 44 y.o. female      Unit/Bed: 6A-15/015-A    Admit Date: 6/12/2025      ASSESSMENT AND PLAN  Active Problems  ADAM secondary to severe volume depletion.   Cr 4.1 on presentation, notable electrolytes abnormalities including hypernatremia and hypercalcemia. HD cath was placed and pt was started on emergent dialysis on admission, 6/12/2025.  Recent renal ultrasound 5/06/25, negative for any acute findings.  Nephrology following.  Continue aggressive IV hydration per Neph.   HD was for hypercalcemia, no need for acute dialysis at this time.   Severe symptomatic hypercalcemia, non-PTH mediated   Had hypercalcemia in March, saw endocrine then. Hypercalcemia had resolved. Presented with Ca of 17 and emergent dialysis as above.   Unclear etiology.  Free kappa/lambo ratio wnl. Immunofixation pending.   Elevated 1,25 dihydroxy D could suggest Granulomatous disease. CT Chest shows diffuse nodular densities throughout mid and lower lung fields. Questionable sarcoidosis.    Discussed with Pulmonology, plan for bronchoscopy today with lymph node and lung biopsy to help with diagnosis.   Nephrology following   Continuing aggressive IV hydration. If continues to worsen, will need dialysis again.   Received calcitonin x2 6/12.   BMP and iCAL daily.   Hypernatremia, secondary to volume depletion   Nephrology following.   On aggressive IVF of D5 plus KCl. Plus HD as above.   Continue free water flushes at 100cc/hr through PEG.   Holding water flushes while NPO, increased IVF.   Continue to monitor Na Q6h.   Adrenal insufficiency, suspected underlying [place]'s disease:   AM cortisol low, 1.0.   ACTH stim test on 5/20/2025 showed inadequate response.  Discontinue on hydrocortisone 100 mg every 8hr, on 6/14/2025.  Started on Decadron 2 mg IV twice daily, first dose at 8 PM on 6/14/2025.  On 40 mg pantoprazole IV for GI PPX.   Endocrinology following.  Plan for ACTH stim test 
    Hospitalist Progress Note      Patient: Radha Yousif 44 y.o. female      Unit/Bed: 6A-15/015-A    Admit Date: 6/12/2025      ASSESSMENT AND PLAN  Active Problems  ADAM secondary to severe volume depletion.   Cr 4.1 on presentation, notable electrolytes abnormalities including hypernatremia and hypercalcemia. HD cath was placed and pt was started on emergent dialysis on admission, 6/12/2025.  Recent renal ultrasound 5/06/25, negative for any acute findings.  Nephrology following.  Continue aggressive IV hydration per Neph.   HD was for hypercalcemia, no need for acute dialysis at this time.   Severe symptomatic hypercalcemia, non-PTH mediated   Had hypercalcemia in March, saw endocrine then. Hypercalcemia had resolved. Presented with Ca of 17 and had emergent dialysis as above.   Unclear etiology.  Free kappa/lambo ratio wnl. Immunofixation pending.   Elevated 1,25 dihydroxy D could suggest Granulomatous disease. ACE elevated. CT Chest shows diffuse nodular densities throughout mid and lower lung fields.   Questionable sarcoidosis.  Pulm was consulted, s/p bronchoscopy with BAL and biopsy of left lower lobe, EBUS with biopsy of adenopathy 6/17.   Follow-up on pathology.  If granulomas present supporting sarcoidosis, will need treatment with prednisone  Also discussed with Endocrine. There have been multiple areas on imaging that read as possible malignancy; including lung nodules and foci in parietal bone. Question if there is an underlying malignant process causing hypercalcemia. Oncology was consulted.   Nephrology following   Continuing aggressive IV hydration. If continues to worsen, will need dialysis again.   Received calcitonin x2 6/12; Pamidronate 6/16.   BMP and iCAL daily  Hypernatremia, central diabetes insipidus  Nephrology following.   On aggressive IVF of D5 plus KCl. Plus HD as above.   Continue free water flushes at 150cc/hr through PEG.   Given desmopressin x 1 6/17 for diabetes insipidus 
    Hospitalist Progress Note      Patient: Radha Yousif 44 y.o. female      Unit/Bed: 6A-15/015-A    Admit Date: 6/12/2025      ASSESSMENT AND PLAN  Active Problems  ADAM secondary to severe volume depletion.   Cr 4.1 on presentation, notable electrolytes abnormalities including hypernatremia and hypercalcemia. HD cath was placed and pt was started on emergent dialysis on admission, 6/12/2025.  Recent renal ultrasound 5/06/25, negative for any acute findings.  Nephrology following.  Continue aggressive IV hydration per Neph.   HD was for hypercalcemia, no need for acute dialysis at this time.   Severe symptomatic hypercalcemia, non-PTH mediated   Had hypercalcemia in March, saw endocrine then. Hypercalcemia had resolved. Presented with Ca of 17 and had emergent dialysis as above.   Unclear etiology.  Free kappa/lambo ratio wnl. Immunofixation pending.   Elevated 1,25 dihydroxy D could suggest Granulomatous disease. CT Chest shows diffuse nodular densities throughout mid and lower lung fields.   Questionable sarcoidosis.  Pulm was consulted, s/p bronchoscopy with BAL and biopsy of left lower lobe, EBUS with biopsy of adenopathy 6/17.   Follow-up on pathology.  If sarcoidosis, will need treatment with prednisone  Nephrology following   Continuing aggressive IV hydration. If continues to worsen, will need dialysis again.   Received calcitonin x2 6/12; Pamidronate 6/16.   BMP and iCAL daily.   Hypernatremia, secondary to volume depletion   Nephrology following.   On aggressive IVF of D5 plus KCl. Plus HD as above.   Continue free water flushes at 150cc/hr through PEG.   Given desmopressin x 1 6/17 for diabetes insipidus per neph.   BMP Q6h  Adrenal insufficiency, suspected underlying [place]'s disease:   AM cortisol low, 1.0.   ACTH stim test on 5/20/2025 showed inadequate response.  Discontinue on hydrocortisone 100 mg every 8hr, on 6/14/2025.  Started on Decadron 2 mg IV twice daily, first dose at 8 PM on 
    Hospitalist Progress Note      Patient: Radha Yousif 44 y.o. female      Unit/Bed: 6A-15/015-A    Admit Date: 6/12/2025      ASSESSMENT AND PLAN  Active Problems  ADAM secondary to severe volume depletion.   Cr 4.1 on presentation, notable electrolytes abnormalities including hypernatremia and hypercalcemia. HD cath was placed and pt was started on emergent dialysis on admission, 6/12/2025.  Recent renal ultrasound 5/06/25, negative for any acute findings.  Nephrology following.  Continue aggressive IV hydration per Neph.   KCl D5 at 200cc/hr.   Severe symptomatic hypercalcemia, non-PTH mediated   Had hypercalcemia in March, saw endocrine then. Hypercalcemia had resolved. Presented with Ca of 17 and emergent dialysis as above.   Unclear etiology.  Elevated 1,25 dihydroxy D could suggest Granulomatous disease  Free kappa/lambo ratio wnl. Immunofixation pending.   Malignancy?  CT abdomen/pelvis reports multiple small noncalcified nodules in both lower lung fields, suspicious for metastatic disease.  Multinodular infiltrative process of left lung base, could represent additional metastatic lesion.  Will get CT chest to further evaluate, unfortunately will have to do without contrast at this time due to ADAM.   Nephrology following   Continuing aggressive IV hydration. If continues to worsen, will need dialysis again.   Received calcitonin x2 6/12.   BMP and iCAL daily.   Hypernatremia, secondary to volume depletion   Nephrology following.   On aggressive IVF of D5 plus KCl. Plus HD as above.   Continue free water flushes at 100cc/hr through PEG.   Continue to monitor Na Q6h.   Adrenal insufficiency, suspected underlying [place]'s disease:   AM cortisol low, 1.0.   ACTH stim test on 5/20/2025 showed inadequate response.  Discontinue on hydrocortisone 100 mg every 8hr, on 6/14/2025.  Started on Decadron 2 mg IV twice daily, first dose at 8 PM on 6/14/2025.  On 40 mg pantoprazole IV for GI PPX.   Endocrinology 
    Hospitalist Progress Note  Internal Medicine Resident      Patient: Radha Yousif 44 y.o. female      Unit/Bed: 6A-15/015-A    Admit Date: 6/12/2025      ASSESSMENT AND PLAN  Active Problems  ADAM secondary to severe volume depletion in setting of hyponatremia hypocalcemia: Creatinine 4.1 on presentation, notable electrolytes abnormalities including hypernatremia and hypercalcemia.  S/p hemodialysis catheter placement 6/12/2025. s/p emergent dialysis on 6/12/2025.  Renal ultrasound 5/06/25, negative for any acute findings.  Nephrology following.  Plan for hemodialysis on 6/14/2025.   Started on calcitonin.  Continue monitoring iCal.  Adrenal insufficiency, suspected underlying [place]'s disease: AM cortisol 1.0, ACTH stim test, 5/20/2025 cortisol 1.0, post 60 minutes 9.70, inadequate response.  Discontinue on hydrocortisone 100 mg every 8hr, on 6/14/2025.  Started on Decadron 2 mg IV twice daily, first dose at 8 PM on 6/14/2025.  Started on 40 mg pantoprazole IV.   Will need subsequent ACTH stim test on 6/16/2025  Endocrinology following.  Severe symptomatic hypercalcemia secondary to severe dehydration: PTH 17.0, PTH-rp, pending. 25-hydroxy 38. 1,25-dihydroxy 143.  Patient had emergent dialysis done without any fluid removal on 6/12/2025, patient hyperglycemia improving, 1.39 iCal from 1.85 OA.  Ordered one-time dose of calcitonin to 210 units.  Discontinued0.45% NS at 125 mL/hr. started on D5 and KCl solution. Nephrology following.  Continue monitoring BMP every 6 hour.   Hypernatremia secondary to severe dehydration: Sodium 155.  Discontinued 0.5% NS, started on D5 plus KCl solution.  Monitor sodium levels with BMP every 6 hour. Nephrology following.  Continue with free water flushes at 100 mL/hr through the feeding tube.   Granuloma?:  Patient noted to have hypercalcemia without elevated 25-hydroxy 38, 0-33-jomlreypv 143.0.  Angiotensin-converting enzyme levels ordered.  Myeloproliferative disorder?:  
    Hospitalist Progress Note  Internal Medicine Resident      Patient: Radha Yousif 44 y.o. female      Unit/Bed: K-28/028-A    Admit Date: 6/12/2025      ASSESSMENT AND PLAN  Active Problems  ADAM secondary to severe volume depletion in setting of hyponatremia hypocalcemia: Creatinine 4.1 on presentation, notable electrolytes abnormalities including hypernatremia and hypercalcemia.  S/p hemodialysis catheter placement 6/12/2025. s/p emergent dialysis on 6/12/2025.  Renal ultrasound 5/06/25, negative for any acute findings.  Nephrology following.  Plan for hemodialysis on 6/14/2025.   Started on calcitonin.  Continue monitoring iCal.  Adrenal insufficiency, suspected underlying [place]'s disease: AM cortisol 1.0, ACTH stim test, 5/20/2025 cortisol 1.0, post 60 minutes 9.70, inadequate response.  Started patient on hydrocortisone 100 mg every 8hr.   Started on 40 mg pantoprazole IV.   Severe symptomatic hypercalcemia secondary to severe dehydration: PTH 17.0, PTH-rp, pending. 25-hydroxy 38. patient had emergent dialysis done without any fluid removal on 6/12/2025, patient hyperglycemia improving, 1.39 iCal from 1.85 OA.  Ordered one-time dose of calcitonin to 210 units.  Continue 0.45% NS at 125 mL/hr. nephrology following..  Hypernatremia secondary to severe dehydration: Sodium 151 from 138 on admission.  Continue with 0.5% NS at 125 mL/hr, nephrology following.  Continue with free water flushes at 100 mL/hr through the feeding tube.   Meningioma, stable: Patient following up with neurosurgery as an outpatient, previous follow-up CT scans has been stable.  CT scan 6/12/2025, reported stable meningioma send on the left anterior clinoid process and extending anteriorly to the left temporal lobe.  Neurosurgery consulted.  Holding off on MRI as CT scans has been stable.  Patient will follow-up with outpatient MRI imaging.   Resolved Problems  NA  Chronic Conditions (reviewed and stable unless otherwise 
  CRITICAL CARE PROGRESS NOTE      Patient:  Radha Yousif    Unit/Bed:4D-05/005-A  YOB: 1980  MRN: [id number]   PCP: Miriam Coppola MD  Date of Admission: 6/12/2025  Chief Complaint:- left hemothorax, hypotension    Assessment and Plan:    Left hemothorax.:  Left chest tube placed with ultrasound guidance on 6/19/2025.  Old dark blood was removed.  First 24 hours had 2730 cc withdrawn.  Since that time, flow has markedly slowed down.  Chest tube was flushed on 6/20/2025 and is patent. POCUS shows small free flowing fluid and what looks to be clotted blood.  CT chest-left pleural cath in place, significant reduction of pleural effusion. 6/21 patient had bright red bleeding from chest tube, heparin was held. 220 output from chest tube last 24 hrs  Anemia of acute blood loss: Secondary to EBUS with 5 g hemoglobin drop and left hemothorax.  Post transfusion.  Trend H&H.  Elevated white blood cell count on presentation: No identified source of sepsis.  Cultures negative.  On Zosyn, vanc. MRSA negative, will stop vanc.  Hypercalcemia resolved: Concern for sarcoidosis.  Post lymph node biopsy.  Pathologic interpretation remains pending.  Specimen was collected 6/16/2025.  S/p pamidronate  Renal failure improving:  S cr 1.2 stable, likely prerenal cause  Central diabetes insipidus.DDAVP ordered yesterday, urine output today 1100  Hypotension: Secondary to hemorrhage into the left thoracic cavity.  NO longer requiring pressors.    History of adrenal insufficiency: Steroid supplementation.  Chronically on midodrine.  Dysphagia: Secondary to remote CVA.  On PEG tube. Plan for [facility] Monday  Diabetes mellitus:Lantus 5 units, Sliding scale insulin.    INITIAL H AND P AND ICU COURSE:  Patient is a 44-year-old white female with type 2 diabetes mellitus, obsessive-compulsive disorder and depression.  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 
  CRITICAL CARE PROGRESS NOTE      Patient:  Radha Yousif    Unit/Bed:4D-05/005-A  YOB: 1980  MRN: [id number]   PCP: Miriam Coppola MD  Date of Admission: 6/12/2025  Chief Complaint:- hypotension, left hemothorax    Assessment and Plan:    Left hemothorax.:  Left chest tube placed with ultrasound guidance on 6/19/2025.  Old dark blood was removed.  First 24 hours had 2730 cc withdrawn.  Since that time, flow has markedly slowed down.  Chest tube was flushed on 6/20/2025 and is patent.  However, significant flow from chest tube has not been established. POCUS shows small free flowing fluid and what looks to be clotted blood.  CT chest-left pleural cath in place, significant reduction of pleural effusion.  Anemia of acute blood loss: Secondary to EBUS with 5 g hemoglobin drop and left hemothorax.  Bleeding appears to have stopped.  Requiring pressors.  Post transfusion.  Trend H&H.  Elevated white blood cell count: No identified source of sepsis.  Cultures negative.  Continue to trend.  On Zosyn, vanc. Follow procalcitonin  Hypercalcemia: Concern for sarcoidosis.  Post lymph node biopsy.  Pathologic interpretation remains pending.  Specimen was collected 6/16/2025.  Renal failure: Per nephrology.  Central diabetes insipidus.DDAVP 0.25 bid today, urine output 4.6 L, sodium 145. Nephrology following  Hypotension: Secondary to hemorrhage into the left thoracic cavity.  Still on pressors.    History of adrenal insufficiency: Steroid supplementation.  Chronically on midodrine.  Dysphagia: Secondary to remote CVA.  On PEG tube. Nurse noted aspiration today, SPL eval. Also noted leaking around peg, tube study done  Diabetes mellitus: Sliding scale insulin.    INITIAL H AND P AND ICU COURSE:  Patient is a 44-year-old white female with type 2 diabetes mellitus, obsessive-compulsive disorder and depression.  Patient has a history of multiple CVAs with resultant left sided paraplegia, aphasia, dysphagia 
  Endocrine progress note    Patient:  Radha Yousif  YOB: 1980    MRN: [id number]         Chief Complaint:  Adrenal insufficiency      Subjective:  The patient was transferred to the ICU overnight due to hypotension. She was found to have a drop in hemoglobin and a large left sided pleural effusion. A chest tube was placed with waldemar bloody drainage. The patient appeared to be more lethargic than the prior day, but was arousable and able to answer questions with hand gestures.      Past Medical History:   Diagnosis Date    Depression     OCD (obsessive compulsive disorder)     Type II or unspecified type diabetes mellitus without mention of complication, not stated as uncontrolled        Past Surgical History:   Procedure Laterality Date    BRONCHOSCOPY  6/16/2025    BRONCHOSCOPY ALVEOLAR LAVAGE performed by Agustin Malone DO at [facility] ENDOSCOPY    BRONCHOSCOPY  6/16/2025    BRONCHOSCOPY ENDOBRONCHIAL ULTRASOUND FINE NEEDLE ASPIRATION performed by Agustin Malone DO at [facility] ENDOSCOPY    WRIST SURGERY         Social History     Tobacco Use    Smoking status: Never     Passive exposure: Yes    Smokeless tobacco: Never   Substance Use Topics    Alcohol use: Yes     Comment: rarely glass of wine maybe weekly        Family History   Problem Relation Age of Onset    Stroke Mother     Cancer Mother     Other Mother         aneurysm    Diabetes Mother     High Blood Pressure Mother     Diabetes Father         Current Facility-Administered Medications   Medication Dose Route Frequency Provider Last Rate Last Admin    0.9 % sodium chloride infusion   IntraVENous PRN Niko Whyte MD        dextrose bolus 10% 125 mL  125 mL IntraVENous PRN Niko Whyte MD        Or    dextrose bolus 10% 250 mL  250 mL IntraVENous PRN Niko Whyte MD        potassium chloride 10 mEq/100 mL IVPB (Peripheral Line)  10 mEq IntraVENous PRN Niko Whyte MD        magnesium sulfate 2000 
  Endocrine progress note    Patient:  Radha Yousif  YOB: 1980    MRN: [id number]         Chief Complaint:  Adrenal insufficiency      Subjective:  The patient presented with fatigue and weakness at [facility] and was found to have severe hypercalcemia with an ADAM. She was transferred to [facility] for additional management. She was previously seen in the endocrinology clinicand worked up for adrenal insufficiency and hypercalcemia. She appears to generally have improved. She denies pain today and is mainly concerned with getting approved for an oral diet. She has no complaints during the patient interview.      Past Medical History:   Diagnosis Date    Depression     OCD (obsessive compulsive disorder)     Type II or unspecified type diabetes mellitus without mention of complication, not stated as uncontrolled        Past Surgical History:   Procedure Laterality Date    WRIST SURGERY         Social History     Tobacco Use    Smoking status: Never     Passive exposure: Yes    Smokeless tobacco: Never   Substance Use Topics    Alcohol use: Yes     Comment: rarely glass of wine maybe weekly        Family History   Problem Relation Age of Onset    Stroke Mother     Cancer Mother     Other Mother         aneurysm    Diabetes Mother     High Blood Pressure Mother     Diabetes Father         Current Facility-Administered Medications   Medication Dose Route Frequency Provider Last Rate Last Admin    dextrose 5 % solution   IntraVENous Continuous Helena Ramon PA-C 150 mL/hr at 06/17/25 0232 New Bag at 06/17/25 0232    diatrizoate meglumine-sodium (GASTROGRAFIN) 66-10 % solution 30 mL  30 mL PEG Tube ONCE PRN Poncho Mckeon MD   30 mL at 06/14/25 1058    [Held by provider] dexAMETHasone (DECADRON) injection 2 mg  2 mg IntraVENous Q12H Quincy Adams P, DO   2 mg at 06/15/25 2145    lansoprazole (PREVACID SOLUTAB) disintegrating tablet 15 mg  15 mg Per NG tube QAM Rhys Guajardo MD   15 mg at 06/17/25 
  Endocrine progress note    Patient:  Radha Yousif  YOB: 1980    MRN: [id number]         Chief Complaint:  Adrenal insufficiency      Subjective:  The patient was resting comfortably in bed communicating with her mother using sign language. She is much closer to her baseline according to her mother. She denied any serious complaints but did say that she was hungary.      Past Medical History:   Diagnosis Date    Depression     OCD (obsessive compulsive disorder)     Type II or unspecified type diabetes mellitus without mention of complication, not stated as uncontrolled        Past Surgical History:   Procedure Laterality Date    BRONCHOSCOPY  6/16/2025    BRONCHOSCOPY ALVEOLAR LAVAGE performed by Agustin Malone DO at [facility] ENDOSCOPY    BRONCHOSCOPY  6/16/2025    BRONCHOSCOPY ENDOBRONCHIAL ULTRASOUND FINE NEEDLE ASPIRATION performed by Agustin Malone DO at [facility] ENDOSCOPY    WRIST SURGERY         Social History     Tobacco Use    Smoking status: Never     Passive exposure: Yes    Smokeless tobacco: Never   Substance Use Topics    Alcohol use: Yes     Comment: rarely glass of wine maybe weekly        Family History   Problem Relation Age of Onset    Stroke Mother     Cancer Mother     Other Mother         aneurysm    Diabetes Mother     High Blood Pressure Mother     Diabetes Father         Current Facility-Administered Medications   Medication Dose Route Frequency Provider Last Rate Last Admin    diatrizoate meglumine-sodium (GASTROGRAFIN) 66-10 % solution 30 mL  30 mL PEG Tube ONCE PRN Helena Ramon PA-C   30 mL at 06/18/25 1316    hydrocortisone sodium succinate PF (SOLU-CORTEF) injection 25 mg  25 mg IntraVENous Q12H Anthony Díaz MD   25 mg at 06/18/25 0636    diatrizoate meglumine-sodium (GASTROGRAFIN) 66-10 % solution 30 mL  30 mL PEG Tube ONCE PRN Poncho Mckeon MD   30 mL at 06/14/25 1058    lansoprazole (PREVACID SOLUTAB) disintegrating tablet 15 mg  15 mg Per NG tube 
  Hospitalist Progress Note      Patient:  Radha Yousif      Unit/Bed:3B-38/038-A    YOB: 1980    MRN: [id number]       Acct: [id number]     PCP: Miriam Coppola MD    Date of Admission: 6/12/2025    Date/Time of Evaluation:  6/23/2025 at 7:58 AM    Assessment/Plan:    Left hemothorax with LLL atelectasis/infiltrate -- new 6/19 per CT chest -- ?due to bronch with EBUS but that was 6/16 -- ??etiology -- pigtail chest tube placed in ICU 6/19/25 -- monitor outpt and pulm to follow for chest tube mgmt -- h/h stable since transfusion 6/19;  cont pain control with APAP prn, morphine prn - add lidocaine patch.  No resp distress and on RA when assumed care 6/23  -- CXR 6/23 with stable left basilar atelectasis and stable small left effusion, no ptx  -- pleural cx 6/19 (-) to date  Acute blood loss anemia due to hemothorax -- down to 5.1 on 6/19 from 12.8 on 6/12 -- s/p transfused 4 units PRBC -- hgb stable since transfusion and currently 9.0 on 6/23 -- FOB (-) 6/19;  hem/onc also following -- cont monitor  Acute on chronic Hypotension likely due to hemorrhagic shock and severe volume depletion in setting of hypernatremia/hypercalcemia requiring pressors 6/19 -- s/p pressors in ICU -- high dose Solu-Cortef with adrenal insufficiency started 6/19 and on midodrine 10 mg tid -- BP improving and taper steroids per endo  Severe symptomatic Hypercalcemia -- non-PTH mediated -- apprec consultants assist -- PTH normal but PTH-rp slightly elevated 6.0  -- apprec renal assist -- Ca 15 (iCal 1.8) on admission -> s/p emergent dialysis, IVF and pamidronate -- CA improved and low but last iCal normal 6/19  -- oncology consulted with concern for malignancy with pulm nodules and mediastinal adenopathy -> s/p bronch and (-) for malignancy;  myeloma w/u with SPEP 6/12 and 6/19 (-) but high light chains but ratio normal; immunofixation (p);  CA 19-9 normal, CEA normal,   -- apprec pulm assist with pulm nodules and elevated 
  Hospitalist Progress Note      Patient:  Radha Yousif      Unit/Bed:3B-38/038-A    YOB: 1980    MRN: [id number]       Acct: [id number]     PCP: Miriam Coppola MD    Date of Admission: 6/12/2025    Date/Time of Evaluation:  6/24/2025 at 8:51 AM    Assessment/Plan:    Left hemothorax with LLL atelectasis/infiltrate -- new 6/19 per CT chest -- ?due to bronch with EBUS but that was 6/16 -- ??etiology -- pigtail chest tube placed in ICU 6/19/25 -- monitor outpt and pulm to follow for chest tube mgmt -- h/h stable since transfusion 6/19;  cont pain control with APAP prn, morphine prn - add lidocaine patch.  No resp distress and on RA when assumed care 6/23 -- awaiting pulm to re-eval yet 6/24 for plan with chest tube - output improving   -- CXR 6/24 with chest tube in place over left lung base, moderate pleural effusion on left, mod atelectasis left mid and lower lung, slightly improved  -- repeat CXR 6/23 with stable left basilar atelectasis and stable small left effusion, no ptx  -- pleural cx 6/19 (-) to date  -- echo 6/23/25 = EF 55-60%, normal wall motion and diastolic fxn, mild MR, no pericardial effusion  Acute blood loss anemia due to hemothorax -- down to 5.1 on 6/19 from 12.8 on 6/12 -- s/p transfused 4 units PRBC -- hgb stable 8-9's since transfusion and currently 9.5 on 6/24 -- FOB (-) 6/19;  hem/onc also following -- cont monitor  Acute on chronic Hypotension likely due to hemorrhagic shock and severe volume depletion in setting of hypernatremia/hypercalcemia requiring pressors 6/19 -- s/p pressors and blood transfusion in ICU -- high dose Solu-Cortef with adrenal insufficiency started 6/19 and on midodrine 10 mg tid -- BP improving and taper steroids per endo -- cont monitor BP closely  Severe symptomatic Hypercalcemia -- non-PTH mediated -- apprec consultants assist -- PTH normal but PTH-rp slightly elevated 6.0  -- apprec renal assist -- Ca 15 (iCal 1.8) on admission -> s/p emergent 
0438: Pt became unresponsive to verbal or painful stimuli, eyes shut, with head flexed forward. HR dropped to 33-40s. Dr. Whyte called to bedside. Prior to this pt had been grunting and was agitated.     0440: NSR on monitor, pt more arousable.     0450: Ceribell applied, seizure burden 0%.     0445: Pt moaning frequently, but able to follow command. Nods yes to feeling tired, denies nausea or headache.  
1130 Dr. Talamantes and REAL Grimm at bedside to place left chest tube.  Consent verified- vitals obtained, see flowsheets.    1140 Left chest tube placed, chest xray ordered.    1141 Verbal order from REAL Grimm CNP to remove ceribell.  Seizure burden 0% at time of removal.  
1320 Pt in specials radiology for temporary dialysis catheter insertion. Discussed procedure with pt's mother andshe verbalizes understanding.Pt not alert. Opens eyes to name but does not follow commands or verbalize.  1332 Moved to table and attached to monitor.  1356 time out.   1358 14 fr x 20 cm Hemo-cath inserted in right IJ per DR Jerez. Sutured to right neck.  1400 Tegaderm with CHG applied to site. No redness, swelling or drainage at site.  1404 Pt positioned on bed for comfort.  1406 Report called to Veronica TARIQ.  1411 Pt transferred to IP dialysis per bed. Mother with pt. No change in condition.    
1527: Pt arrives to pacu, responds to verbal stimuli, unable to follow commands currently. Pt on room air with opa in place, respirations easy and unlabored. VSS    1540: Pt awakens on her own, opa removed. Tolerated well    1545: Emptied 750mls out of catheter bag at this time    1548: Report given to Macarena on 6A    1557: Pt meets criteria for discharge from pacu, transported back to 6A per patients sitter          
1610 Patient received in IR for Gastrostomy tube replacement under conscious sedation.  1616 This procedure has been fully reviewed with the patient and written informed consent has been obtained.  1642 1mg versed, 0.5mg fentanyl  1643 Procedure started with   1650 Procedure completed; patient tolerated well. Dressing to abdomen, split gauze; no bleeding noted.  1656 Patient on bed; comfort ensured.  1707 Patient taken to 3B via bed/transport. Pt alert and oriented x3; follows commands. Skin pink, warm, and dry. Respirations easy, regular, and nonlabored.  Report called to Mirna TARIQ on 3B.   
Admitted to Endo department and admitted to Endo room 12  Plan of care reviewed with patient.   Call light within reach.   Allergies reviewed with Sammy (mother)  Bed in lowest position, locked, with one bed rail up.   Appropriate arm bands on patient.   Bathroom offered.   All questions and concerns of patient addressed    Name: Sammy  Relationship to patient: mother  Phone number: [phone]   
[facility]  INPATIENT SPEECH THERAPY  STRZ ICU 4D  DAILY NOTE    Discharge Recommendations: SNF  DIET ORDER RECOMMENDATIONS: NPO, PEG tube; MBS order active  STRATEGIES: Oral care q2h     SLP Individual Minutes  Time In: 1111  Time Out: 1130  Minutes: 19  Timed Code Treatment Minutes: 0 Minutes       Date: 2025  Patient Name: Radha Yousif      CSN: [id number]   : 1980  (44 y.o.)  Gender: female   Referring Physician:  Helena Ramon PA-C  Diagnosis: ADAM (acute kidney injury)  Precautions: Fall risk, aspiration precautions   Current Diet: NPO; PEG tube  Respiratory Status: Room Air  Date of Last MBS/FEES: Unknown    Pain:  No pain reported.    Subjective:  Novel order received per Melissa Grimm APRN - CNP; current dysphagia management POC maintains to be relevant. RN Melo with approval to proceed, indications for transition from puree diet/thin liquids to NPO s/t concerns for, \"aspiration\" with breakfast meal. Patient resting in bed upon arrival, spontaneously awake with mother present at bedside. Patient dominant with multi-modal communication via gestures an ASL s/t hx of CVA impacting speech production measures; ?AOS. Patient and patient's mother confirming prior report for pleasure feeds, however, variation from thin liquids vs mildly thick.     Short-Term Goals:  Goal 1: Patient will consume puree diet/thin liquids with stable pulmonary status and incorporation of trained compensatory strategies to assist with nutrition/hydration measures. GOAL NOT MET  REVISED GOAL: Complete instrumental evaluation via MBS for further diagnostic assessment of pharyngeal integrity.   Goal 2: Considerations for an instrumental evaluation should adverse changes be conveyed in direct relation to the safety/efficency of the swallow mechanism.    Interventions:  Comprehensive oral care procedural analysis facilitated via hydrogen peroxide rinse+toothettes with all quadrants/regions targeted. No 
Attending Supervising Physician’s Attestation Statement  I was present with the resident physician during the history and exam. I discussed the findings and plans with the resident physician and agree as documented in her note . The patient required ICU care for the management of hypotensive due to acute anemia. Time spent providing critical care, not including procedures was 30 minutes.  Hx CVA and left hemiparesis. Presented with hypotension. Concern for sepsis vs hypovolemia  Left effusion present. Chest tube placed and large hemothorax drained. Patient recent bronchoscopy for biopsy of pulmonary nodules in setting of hypercalcemia.   Chest tube output: 530 in 24 hours. CXR with left effusion which is decreased somewhat compared to yesterday. Cultures so far are negative.  Patient transfused 2 u prbc for hgb of 5. Hgb is now 10.  Patient is on RA  Patient had bradycardia overnight with stable BP. These episodes are self limited  Will obtain tube tube study, as patient is leaking around PEG tube. Hold TF for now.  ADAM. Creatinine improved to baseline. Her uop is high. Patient is being evaluated for central DI.  Na is 145 and she is 7L negative on this stay.   Will start DDAVP  Give 500 ml bolus of NS   Respiratory sample from 6/16 is negative. Moderate LE in urine on 6/19- did not reflex. 6/19 blood culture done x1 with no growth. She is con zosyn and vancomycin and will continue these. WBC 24 from 33. Unclear etiology. No fevers. Elevated neutrophils on cbc.   Will check procal  Electronically signed by Lizzie Garcia MD on 6/21/25 at 10:15 AM EDT      
Bolus feed given Jevity 1.5 kee 300 ml, 120ml water flush given before and after feed. Pt tolerated well.  
Bronchoscopy and EBUS completed. Tolerated well. Photos taken. Biopsies taken.BAL obtained. FNA  from stations 7, 10R, and 10L lymph nodes. Six specimen jars labeled and sent to lab. Scope  and  used.  
Comprehensive Nutrition Assessment    Type and Reason for Visit:  Reassess, Nutrition support    Nutrition Recommendations/Plan:   Continue bolus tube feeding regimen of Jevity 1.5 at 300 ml x 3 bolus feeds per day (recommend 8am, 12pm, 4pm). Plan to adjust tube feeds pending diet advancement.   Initiate water flushes of 120 ml before and after each bolus feed- nephrology to adjust as needed d/t hypernatremia (tube feeds + water flushes will provide 1404 ml free water)    Home PEG tube feeding regimen: Isosource 1.5 at 250 ml x 3 bolus feeds with 50 ml water flushes before and after each bolus feed (tube feeds + water flushes were providing 873 ml free water).  Notice per Mariana HUNT RN,  pt on pureed diet at & tolerated it well there previously noted in RD notes (6/12/25). Consider SLP swallow eval as appropriate re: diet.  RN mentions tube feeds on hold now until pulmonary sees in case of possible procedures.         Malnutrition Assessment:  Malnutrition Status:  No malnutrition (06/12/25 0930)    Context:  Chronic Illness     Findings of the 6 clinical characteristics of malnutrition:  Energy Intake:  No decrease in energy intake (meets nutrition needs via PEG tube)  Weight Loss:  No weight loss     Body Fat Loss:  No body fat loss     Muscle Mass Loss:  No muscle mass loss    Fluid Accumulation:  No fluid accumulation     Strength:  Not Performed    Nutrition Assessment:     Pt. nutritionally not improving  AEB no diet order/ tube feeds on hold until pulmonary sees in case of procedure per RN.  . At risk for further nutrition compromise r/t admitted d/t lethargy and AMS. Noted underlying medical condition (PMHx depression, OCD, T2DM, CVA with left-sided deficits and aphasia, PEG tube placed 9597-2530?, neurogenic bladder, meningioma)     Nutrition Related Findings:    Pt. Report/Treatments/Miscellaneous: Pt seen, tube feeds are on hold due to RN reports nodules found on CT scan & waiting for pulmonology to 
Comprehensive Nutrition Assessment    Type and Reason for Visit:  Reassess, Nutrition support    Nutrition Recommendations/Plan:   Modifying tube feeding regimen order placed by resident as it is significantly exceeding estimated nutritional needs (~900 kcals excess) - pt on pleasure PO diet as well and wish to encourage some PO intake to also add additional kcals. Changes should assist with better glucose control.   Glucerna 1.5 at 40 mL/hr x 24 hours  Additional free H2O per provider - recommend increasing to 125 mL q 4 hours  Continue pleasure feeding per SLP recommendations - pureed 1-2 items per tray.  Will monitor need for additional nutrition interventions as appropriate/able.  Plan to discharge to [facility]     Malnutrition Assessment:  Malnutrition Status:  No malnutrition (06/12/25 0930)    Context:  Chronic Illness     Findings of the 6 clinical characteristics of malnutrition:  Energy Intake:  No decrease in energy intake (meets nutrition needs via PEG tube)  Weight Loss:  No weight loss     Body Fat Loss:  No body fat loss     Muscle Mass Loss:  No muscle mass loss    Fluid Accumulation:  No fluid accumulation     Strength:  Not Performed    Nutrition Assessment:     Pt.nutritionally improving  AEB ok to initiate trickle tube feeds.  At risk for further nutrition compromise r/t admitted d/t lethargy and AMS - EEG showing moderate to severe encephalopathy, ADAM, s/p EBUS bronch 6/16, L hemothorax - L chest tube placed 6/19, central diabetes insipidus. Noted underlying medical condition (PMHx depression, OCD, T2DM, CVA with left-sided deficits and aphasia, PEG tube placed [id number]?, neurogenic bladder, meningioma).     Nutrition Related Findings:   Pt. Report/Treatments/Miscellaneous: Pt seen, denies N/V, abdominal pain, and tolerating pleasure feeds. Spoke with RN - tolerating formula and regimen without concerns. Tube feeding running at 65 mL/hr as per orders. Exceeding nutritional needs - 
Comprehensive Nutrition Assessment    Type and Reason for Visit:  Reassess, Nutrition support    Nutrition Recommendations/Plan:   PEG pulled last night - pending IR replacement today  When PEG replaced, initiate Glucerna 1.5 at 25 mL/hr, advancing 10 mL/hr every 4 hours as tolerated to reach goal rate of 45 mL/hr x 23 hours  Holding 30 mins before and 30 mins after synthroid administration  Additional free water per provider - last ordered 100 mL q 4 hours; recommend 125 mL q 4 hours if no IVF  Goal regimen: Glucerna 1.5 at 45 mL/hr x 23 hours  Additional free H2O per provider - recommend increasing to 125 mL q 4 hours  Continue pleasure feeding per SLP recommendations - pureed 1-2 items per tray.  Will monitor need for additional nutrition interventions as appropriate/able.  Plan to discharge to [facility]     Malnutrition Assessment:  Malnutrition Status:  No malnutrition (06/12/25 0930)    Context:  Chronic Illness     Findings of the 6 clinical characteristics of malnutrition:  Energy Intake:  No decrease in energy intake (meets nutrition needs via PEG tube)  Weight Loss:  No weight loss     Body Fat Loss:  No body fat loss     Muscle Mass Loss:  No muscle mass loss    Fluid Accumulation:  No fluid accumulation     Strength:  Not Performed    Nutrition Assessment:     Pt.nutritionally improving  AEB ok to initiate trickle tube feeds.  At risk for further nutrition compromise r/t admitted d/t lethargy and AMS - EEG showing moderate to severe encephalopathy, ADAM, s/p EBUS bronch 6/16, L hemothorax - L chest tube placed 6/19, central diabetes insipidus. Noted underlying medical condition (PMHx depression, OCD, T2DM, CVA with left-sided deficits and aphasia, PEG tube placed [id number]?, neurogenic bladder, meningioma).     Nutrition Related Findings:   Pt. Report/Treatments/Miscellaneous: Pt seen, PEG fell out last night. Responded to questions with a thumbs up - assuming pt doing well with feedings prior to 
Comprehensive Nutrition Assessment    Type and Reason for Visit: Reassess, Nutrition support    Nutrition Recommendations/Plan:   Per wilma Mathur to initiate trickle tube feeds. Initiate Glucerna at 10 ml/hr.   Water flushes per MD- monitoring renal status.   Home PEG tube feeding regimen: Isosource 1.5 at 250 ml x 3 bolus feeds with 50 ml water flushes before and after each bolus feed (tube feeds + water flushes were providing 873 ml free water).  Notice per Mariana HUNT RN,  pt on pureed diet at & tolerated it well there previously noted in RD notes (6/12/25). Consider SLP swallow eval as appropriate re: diet.        Malnutrition Assessment:  Malnutrition Status: No malnutrition  Context: Chronic Illness       Findings of the 6 clinical characteristics of malnutrition:  Energy Intake:  No decrease in energy intake (meets nutrition needs via PEG tube)  Weight Loss:  No weight loss     Body Fat Loss:  No body fat loss     Muscle Mass Loss:  No muscle mass loss    Fluid Accumulation:  No fluid accumulation     Strength:  Not Performed    Nutrition Assessment:    Pt.nutritionally improving  AEB ok to initiate trickle tube feeds.  At risk for further nutrition compromise r/t admitted d/t lethargy and AMS. Noted underlying medical condition (PMHx depression, OCD, T2DM, CVA with left-sided deficits and aphasia, PEG tube placed [id number]?, neurogenic bladder, meningioma).    Nutrition Related Findings:    Pt. Report/Treatments/Miscellaneous:   6/20: Patient seen, laying in bed. Per Dr. Albin dillon to initiate trickle tube feeds. Chest tube in place with 2730 ml output in the last 24 hours.   6/16: Pt seen, tube feeds are on hold due to RN reports nodules found on CT scan & waiting for pulmonology to see pt & determine whether a procedure is needed. Pt uses sign language & nods head yes or no. Receiving mouth swab from nurse aide. Notice blood sugars are elevated however diabetic tube feeding a high protein formula than 
[facility]--HOSPITALIST GROUP    Hospitalist PROGRESS NOTE dictated by Reese Ford MD on 2025    Patient ID: Radha Yousif is 44 y.o. and presently in room [facility]/038-A  : 1980 MRN: [id number]    Admit date: 2025  Primary Care Physician: Miriam Coppola MD   Patient Care Team:  Miriam Coppola MD as PCP - General (Family Medicine)  [facility]  Tel: [phone]  Admitting Physician: Anival Talamantes MD   Code Status:  Full Code    Radha Yousif is a 44 y.o.  female who presented with No chief complaint on file.    Room: [facility]038-A  Admit date: 2025       History Of Present Illness:    Radha Yousif is a 44 y.o. 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  Since admission, patient has had dialysis catheter placed by IR and undergone dialysis with nephrology and since had dialysis catheter removed.  Neurosurgery has seen for stable meningioma and will only follow peripherally at this time.  Pulmonology and endocrinology have been working up her hypercalcemia, including bronc with biopsy left lower lobe, EBUS with biopsy of 3 lymph nodes performed by Dr. Malone on 2025.  On the evening of , patient noted to be hypotensive and rapid response was called.  Despite fluid resuscitation, patient required Levophed administration and transferred to the ICU.       2025: Patient with sarcoidosis, diabetes insipidus with mother at bedside nods no when asked if he has any pain.  Patient pleasant and appears comfortable.      PEG fell out so an order placed with IR for replacement.  Bazzi placed instead of PEG at present time for medication administration.      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  Endocrinology 
Department of Internal Medicine  Pulmonary  Attending Progress Note      SUBJECTIVE:  Pt seen and examined.  The patient is resting in bed comfortably, her mom is bedside.  The patient developed an unclear cause for hemothorax and was in ICU had a pigtail chest tube placed.  The patient's tube has drained minimally over the past few shifts.  The patient denies shortness of breath, no oxygen needs, denies chest pain, but does admit the tube does bother her, and is uncomfortable. No other noted contributing factors/complaints, however ROS limited due to non-verbal.      OBJECTIVE      Medications    Current Facility-Administered Medications: [START ON 6/25/2025] DESMOpressin (DDAVP) tablet 50 mcg, 50 mcg, Oral, Daily  lidocaine 4 % external patch 1 patch, 1 patch, TransDERmal, Daily  levothyroxine (SYNTHROID) tablet 50 mcg, 50 mcg, PEG Tube, Daily  hydrocortisone sodium succinate PF (SOLU-CORTEF) injection 50 mg, 50 mg, IntraVENous, Q12H  insulin glargine (LANTUS) injection vial 5 Units, 5 Units, SubCUTAneous, Daily  dextrose bolus 10% 125 mL, 125 mL, IntraVENous, PRN **OR** dextrose bolus 10% 250 mL, 250 mL, IntraVENous, PRN  magnesium sulfate 2000 mg in 50 mL IVPB premix, 2,000 mg, IntraVENous, PRN  sodium phosphate 15 mmol in sodium chloride 0.9 % 250 mL IVPB, 15 mmol, IntraVENous, PRN  [COMPLETED] piperacillin-tazobactam (ZOSYN) 4,500 mg in sodium chloride 0.9 % 100 mL IVPB (addEASE), 4,500 mg, IntraVENous, Once **FOLLOWED BY** piperacillin-tazobactam (ZOSYN) 3,375 mg in sodium chloride 0.9 % 50 mL IVPB (addEASE), 3,375 mg, IntraVENous, Q8H  sodium chloride flush 0.9 % injection 5-40 mL, 5-40 mL, IntraVENous, 2 times per day  sodium chloride flush 0.9 % injection 5-40 mL, 5-40 mL, IntraVENous, PRN  morphine (PF) injection 2 mg, 2 mg, IntraVENous, Q4H PRN  insulin lispro (HUMALOG,ADMELOG) injection vial 0-8 Units, 0-8 Units, SubCUTAneous, 4x Daily AC & HS  0.9 % sodium chloride infusion, , IntraVENous, 
Department of Internal Medicine  Pulmonary  Attending Progress Note      SUBJECTIVE:  Pt seen and examined.  The patient resting comfortably in bed, her mother is bedside again this morning.  The xray looks ok and so chest tube was removed bedside and dressed with gauze and tape. The patient denies shortness of breath, chest pain, n,v,d or abdominal pain.  Rest of ROS difficult to assess due to being non-verbal.      OBJECTIVE      Medications    Current Facility-Administered Medications: DESMOpressin (DDAVP) tablet 50 mcg, 50 mcg, Oral, BID  lidocaine 4 % external patch 1 patch, 1 patch, TransDERmal, Daily  levothyroxine (SYNTHROID) tablet 50 mcg, 50 mcg, PEG Tube, Daily  hydrocortisone sodium succinate PF (SOLU-CORTEF) injection 50 mg, 50 mg, IntraVENous, Q12H  insulin glargine (LANTUS) injection vial 5 Units, 5 Units, SubCUTAneous, Daily  dextrose bolus 10% 125 mL, 125 mL, IntraVENous, PRN **OR** dextrose bolus 10% 250 mL, 250 mL, IntraVENous, PRN  magnesium sulfate 2000 mg in 50 mL IVPB premix, 2,000 mg, IntraVENous, PRN  sodium phosphate 15 mmol in sodium chloride 0.9 % 250 mL IVPB, 15 mmol, IntraVENous, PRN  [COMPLETED] piperacillin-tazobactam (ZOSYN) 4,500 mg in sodium chloride 0.9 % 100 mL IVPB (addEASE), 4,500 mg, IntraVENous, Once **FOLLOWED BY** piperacillin-tazobactam (ZOSYN) 3,375 mg in sodium chloride 0.9 % 50 mL IVPB (addEASE), 3,375 mg, IntraVENous, Q8H  sodium chloride flush 0.9 % injection 5-40 mL, 5-40 mL, IntraVENous, 2 times per day  sodium chloride flush 0.9 % injection 5-40 mL, 5-40 mL, IntraVENous, PRN  morphine (PF) injection 2 mg, 2 mg, IntraVENous, Q4H PRN  insulin lispro (HUMALOG,ADMELOG) injection vial 0-8 Units, 0-8 Units, SubCUTAneous, 4x Daily AC & HS  0.9 % sodium chloride infusion, , IntraVENous, PRN  ondansetron (ZOFRAN-ODT) disintegrating tablet 4 mg, 4 mg, Oral, Q8H PRN **OR** ondansetron (ZOFRAN) injection 4 mg, 4 mg, IntraVENous, Q6H PRN  polyethylene glycol (GLYCOLAX) packet 
Echo attempt made. Patient out of room. Will check back as able to complete echo.  
Free water flushes held at this time per rajwinder REDDY, 8pm bolus feed not given per rajwinder REDDY.  
[facility]  INPATIENT SPEECH THERAPY  [facility] CCU-STEPDOWN 3B  DAILY NOTE    Discharge Recommendations: SNF  DIET ORDER RECOMMENDATIONS AFTER EVALUATION: PEG, pleasure feeds of puree and thin liquids via straw + chin tuck  Strategies:  Full Upright Position, Small Bite/Sip, Chin Tuck, Direct 1:1 Supervision, and Limit Distractions           SLP Individual Minutes  Time In: 1023  Time Out: 1038  Minutes: 15  Timed Code Treatment Minutes: 0 Minutes       Date: 2025  Patient Name: Radha Yousif      CSN: [id number]   : 1980  (44 y.o.)  Gender: female   Referring Physician:  Helena Ramon PA-C   Diagnosis: ADAM (acute kidney injury)  Precautions: Aspiration   Current Diet: Puree diet with thin liquids + PEG tube   Respiratory Status: Room Air  Date of Last MBS/FEES: MBS 25    Pain:  No pain reported.    Subjective:  CHANDNI Mota approved ST attempt; CHANDNI Mota reporting \"plans for discharge this morning, did consume breakfast well and pills crushed in puree. Some coughing noted initially.\" Patient with caregiver present at bedside. Caregiver denies ongoing questions/concerns.     Short-Term Goals:  SHORT TERM GOAL #1:  Goal 1: Patient will consume puree diet/thin liquids via staw + chin tuck with stable pulmonary status and incorporation of trained compensatory strategies to assist with nutrition/hydration measures  INTERVENTIONS:Skilled dysphagia intervention completed with PO trials of thin liquids and puree; patient within less than optimal positioning, does require direct 1:1 cues and consistent feedback to utilize chin tuck. Patient consumed PO trials of thin liquids via straw; x1 first drink patient demonstrated with poor coordination and immediate coughing. Following recovery of coughing episode, patient then consumed PO trials of thin liquids via spoon with direct verbal cues provided \"hold, chin tuck, swallow\" - patient completed x5 trials in total. Patient then consumed additional PO 
[facility]  OCCUPATIONAL THERAPY MISSED TREATMENT NOTE  [facility] CCU-STEPDOWN 3B  [id number]      Date: 2025  Patient Name: Radha Yousif        CSN: [id number]   : 1980  (44 y.o.)  Gender: female   Referring Practitioner: Rhys Patiño MD            REASON FOR MISSED TREATMENT: OT attempted at this time, although pt having echo completed. Will check back as able               
[facility]   Nephrology progress note  6/15/2025, 12:18 PM      Pt Name:    Radha Yousif  MRN:     [id number]     YOB: 1980  Admit Date:    6/12/2025  2:41 AM    Chief Complaint: Nephrology following for ADAM, hypercalcemia, hypernatremia.    Subjective:  Patient was seen and examined this morning.   Pt still confused,       Objective:  24HR INTAKE/OUTPUT:    Intake/Output Summary (Last 24 hours) at 6/15/2025 1218  Last data filed at 6/15/2025 1156  Gross per 24 hour   Intake 5716.86 ml   Output 4900 ml   Net 816.86 ml      Admission weight: 52.4 kg (115 lb 8.3 oz)  Wt Readings from Last 3 Encounters:   06/13/25 54.4 kg (119 lb 14.9 oz)   04/08/25 53.1 kg (117 lb)   02/11/25 51.8 kg (114 lb 3.2 oz)        Vitals :   Vitals:    06/14/25 2345 06/15/25 0600 06/15/25 0753 06/15/25 1112   BP: (!) 130/100 118/89 (!) 125/99 (!) 135/108   Pulse: 76 65 65 73   Resp: 18 18 16 20   Temp: 97.6 °F (36.4 °C) 97.6 °F (36.4 °C) 97.6 °F (36.4 °C) 98.1 °F (36.7 °C)   TempSrc: Axillary Axillary Oral Oral   SpO2: 100% 95% 100% 100%   Weight:       Height:           Physical examination  General Appearance: awake, nonverbal  Mouth/Throat: Oral mucosa moist  Neck: No JVD  Lungs: Air entry B/L, no rales, no use of accessory muscles  Heart:  S1, S2 heard  GI: soft, non-tender, no guarding  Extremities: no LE edema    Medications:  Infusion:    potassium chloride 40 mEq in dextrose 5 % 1,000 mL infusion 200 mL/hr at 06/15/25 1155    sodium chloride      dextrose       Meds:    dexAMETHasone  2 mg IntraVENous Q12H    [START ON 6/16/2025] cosyntropin  250 mcg IntraVENous Once    lansoprazole  15 mg Per NG tube QAM AC    insulin lispro  0-16 Units SubCUTAneous 4x Daily AC & HS    insulin glargine  15 Units SubCUTAneous Nightly    sodium chloride flush  10 mL IntraVENous 2 times per day    heparin (porcine)  5,000 Units SubCUTAneous 3 times per day    chlorhexidine  15 mL Mouth/Throat BID    [Held by 
[facility]   Nephrology progress note  6/16/2025, 7:19 AM      Pt Name:    Radha Yousif  MRN:     [id number]     YOB: 1980  Admit Date:    6/12/2025  2:41 AM    Chief Complaint: Nephrology following for ADAM, hypercalcemia, hypernatremia.    Subjective:  Patient was seen and examined this morning.   Mother and sitter at bedside.   Eyes are open, seems more alert than Friday.   Has had polyuria  - urine output 7.7 liters past 24 hours.      Objective:  24HR INTAKE/OUTPUT:    Intake/Output Summary (Last 24 hours) at 6/16/2025 0719  Last data filed at 6/16/2025 0653  Gross per 24 hour   Intake 3401 ml   Output 7700 ml   Net -4299 ml      Admission weight: 52.4 kg (115 lb 8.3 oz)  Wt Readings from Last 3 Encounters:   06/13/25 54.4 kg (119 lb 14.9 oz)   04/08/25 53.1 kg (117 lb)   02/11/25 51.8 kg (114 lb 3.2 oz)        Vitals :   Vitals:    06/15/25 1112 06/15/25 1503 06/15/25 1945 06/16/25 0245   BP: (!) 135/108 (!) 145/105 (!) 136/103 (!) 138/110   Pulse: 73 66 77 96   Resp: 20 18 18 17   Temp: 98.1 °F (36.7 °C) 98.4 °F (36.9 °C) 97.3 °F (36.3 °C) 97.3 °F (36.3 °C)   TempSrc: Oral Oral Oral Oral   SpO2: 100% 99% 98% 97%   Weight:       Height:           Physical examination  General Appearance: awake, nonverbal  Mouth/Throat: Oral mucosa moist  Neck: No JVD  Lungs: Air entry B/L, no rales, no use of accessory muscles  Heart:  S1, S2 heard  GI: soft, non-tender, no guarding  Extremities: no LE edema    Medications:  Infusion:    dextrose      sodium chloride      dextrose       Meds:    zoledronic acid (ZOMETA) 4 mg in sodium chloride 0.9 % 100 mL IVPB  4 mg IntraVENous Once    dexAMETHasone  2 mg IntraVENous Q12H    cosyntropin  250 mcg IntraVENous Once    lansoprazole  15 mg Per NG tube QAM AC    insulin lispro  0-16 Units SubCUTAneous 4x Daily AC & HS    insulin glargine  15 Units SubCUTAneous Nightly    sodium chloride flush  10 mL IntraVENous 2 times per day    heparin 
[facility]   Nephrology progress note  6/17/2025, 10:22 AM      Pt Name:    Radha Yousif  MRN:     [id number]     YOB: 1980  Admit Date:    6/12/2025  2:41 AM    Chief Complaint: Nephrology following for ADAM, hypercalcemia, hypernatremia.    Subjective:  Patient was seen and examined this morning.   She is s/p EBUS with lymph node biopsy.   Urine output 4.5 liters past 24hours.      Objective:  24HR INTAKE/OUTPUT:    Intake/Output Summary (Last 24 hours) at 6/17/2025 1022  Last data filed at 6/17/2025 0618  Gross per 24 hour   Intake 3580.97 ml   Output 4405 ml   Net -824.03 ml      Admission weight: 52.4 kg (115 lb 8.3 oz)  Wt Readings from Last 3 Encounters:   06/13/25 54.4 kg (119 lb 14.9 oz)   04/08/25 53.1 kg (117 lb)   02/11/25 51.8 kg (114 lb 3.2 oz)        Vitals :   Vitals:    06/16/25 1630 06/16/25 1935 06/17/25 0242 06/17/25 0845   BP: (!) 134/96 108/84 90/73 93/75   Pulse: 87 95 80 79   Resp: 16 16 16 16   Temp:  98.1 °F (36.7 °C) 98.2 °F (36.8 °C) 98.3 °F (36.8 °C)   TempSrc:  Oral Oral Oral   SpO2: 98% 100% 100% 99%   Weight:       Height:           Physical examination  General Appearance: awake, nonverbal  Mouth/Throat: Oral mucosa moist  Neck: No JVD  Lungs: Air entry B/L, no rales, no use of accessory muscles  Heart:  S1, S2 heard  GI: soft, non-tender, no guarding  Extremities: no LE edema    Medications:  Infusion:    sodium chloride      dextrose       Meds:    DESMOpressin (DDAVP) 2 mcg in sodium chloride 0.9 % 4 mL syringe  2 mcg IntraVENous Once    [Held by provider] dexAMETHasone  2 mg IntraVENous Q12H    lansoprazole  15 mg Per NG tube QAM AC    insulin lispro  0-16 Units SubCUTAneous 4x Daily AC & HS    insulin glargine  15 Units SubCUTAneous Nightly    sodium chloride flush  10 mL IntraVENous 2 times per day    heparin (porcine)  5,000 Units SubCUTAneous 3 times per day    chlorhexidine  15 mL Mouth/Throat BID    [Held by provider] ferrous sulfate  
[facility]   Nephrology progress note  6/18/2025, 10:48 AM      Pt Name:    Radha Yousif  MRN:     [id number]     YOB: 1980  Admit Date:    6/12/2025  2:41 AM    Chief Complaint: Nephrology following for ADAM, hypercalcemia, hypernatremia.    Subjective:  Patient was seen and examined this morning.   Mother at bedside. Sitter at bedside.   No overnight events.   She did develop some chest discomfort after DDAVP yesterday.  Urine output dropped significantly after DDAVP.      Objective:  24HR INTAKE/OUTPUT:    Intake/Output Summary (Last 24 hours) at 6/18/2025 1048  Last data filed at 6/18/2025 0328  Gross per 24 hour   Intake 500 ml   Output 550 ml   Net -50 ml      Admission weight: 52.4 kg (115 lb 8.3 oz)  Wt Readings from Last 3 Encounters:   06/13/25 54.4 kg (119 lb 14.9 oz)   04/08/25 53.1 kg (117 lb)   02/11/25 51.8 kg (114 lb 3.2 oz)        Vitals :   Vitals:    06/17/25 1926 06/18/25 0218 06/18/25 0556 06/18/25 0855   BP: 101/70 101/67 111/77 101/61   Pulse: 77 72 73 61   Resp: 16 16 16 18   Temp: 98.6 °F (37 °C) 98.2 °F (36.8 °C) 98.3 °F (36.8 °C) 98.5 °F (36.9 °C)   TempSrc: Oral Oral Oral Oral   SpO2: 98% 100% 99% 97%   Weight:       Height:           Physical examination  General Appearance: awake, nonverbal  Mouth/Throat: Oral mucosa moist  Neck: No JVD  Lungs: Air entry B/L, no rales, no use of accessory muscles  Heart:  S1, S2 heard  GI: soft, non-tender, no guarding  Extremities: no LE edema    Medications:  Infusion:    sodium chloride      dextrose       Meds:    hydrocortisone sodium succinate PF  25 mg IntraVENous Q12H    lansoprazole  15 mg Per NG tube QAM AC    insulin lispro  0-16 Units SubCUTAneous 4x Daily AC & HS    insulin glargine  15 Units SubCUTAneous Nightly    sodium chloride flush  10 mL IntraVENous 2 times per day    heparin (porcine)  5,000 Units SubCUTAneous 3 times per day    chlorhexidine  15 mL Mouth/Throat BID    [Held by provider] 
[facility]   Nephrology progress note  6/20/2025, 2:15 PM      Pt Name:    Radha Yousif  MRN:     [id number]     YOB: 1980  Admit Date:    6/12/2025  2:41 AM    Chief Complaint: Nephrology following for ADAM, hypercalcemia, hypernatremia.    Subjective:  Patient was seen and examined this morning.   Late entry.   On low dose levophed.   Urine output 1600 mL overnight.     Objective:  24HR INTAKE/OUTPUT:    Intake/Output Summary (Last 24 hours) at 6/20/2025 1415  Last data filed at 6/20/2025 1227  Gross per 24 hour   Intake 3309.22 ml   Output 2830 ml   Net 479.22 ml      Admission weight: 52.4 kg (115 lb 8.3 oz)  Wt Readings from Last 3 Encounters:   06/20/25 59.1 kg (130 lb 4.7 oz)   04/08/25 53.1 kg (117 lb)   02/11/25 51.8 kg (114 lb 3.2 oz)        Vitals :   Vitals:    06/20/25 1130 06/20/25 1145 06/20/25 1200 06/20/25 1215   BP:  (!) 100/35 (!) 75/58 120/75   Pulse: 69 61 54 53   Resp: 17 14 12 12   Temp: 98.4 °F (36.9 °C) 98.6 °F (37 °C) 98.4 °F (36.9 °C) 98.4 °F (36.9 °C)   TempSrc: Rectal      SpO2: 96% 97% 97% 93%   Weight:       Height:           Physical examination  General Appearance: awake, nonverbal  Mouth/Throat: Oral mucosa moist  Neck: No JVD  Lungs: Air entry B/L, no rales, no use of accessory muscles  Heart:  S1, S2 heard  GI: soft, non-tender, no guarding  Extremities: no LE edema    Medications:  Infusion:    sodium chloride      sodium chloride Stopped (06/19/25 0807)    sodium chloride      sodium chloride      norepinephrine 4 mcg/min (06/20/25 1227)    sodium chloride      dextrose       Meds:    insulin glargine  5 Units SubCUTAneous Daily    vancomycin  750 mg IntraVENous Q18H    pantoprazole  40 mg IntraVENous Daily    hydrocortisone sodium succinate PF  100 mg IntraVENous Q8H    piperacillin-tazobactam  3,375 mg IntraVENous Q8H    vancomycin (VANCOCIN) intermittent dosing (placeholder)   Other RX Placeholder    sodium chloride flush  5-40 mL 
[facility]   Nephrology progress note  6/21/2025, 12:19 PM      Pt Name:    Radha Yousif  MRN:     [id number]     YOB: 1980  Admit Date:    6/12/2025  2:41 AM    Chief Complaint: Nephrology following for ADAM, hypercalcemia, hypernatremia.    Subjective:  Patient was seen and examined this morning.   Late entry.   On  levophed.   + Chest tube  Awake  On room air    Objective:  24HR INTAKE/OUTPUT:    Intake/Output Summary (Last 24 hours) at 6/21/2025 1219  Last data filed at 6/21/2025 1044  Gross per 24 hour   Intake 1053.11 ml   Output 4955 ml   Net -3901.89 ml      Admission weight: 52.4 kg (115 lb 8.3 oz)  Wt Readings from Last 3 Encounters:   06/21/25 62.1 kg (136 lb 14.5 oz)   04/08/25 53.1 kg (117 lb)   02/11/25 51.8 kg (114 lb 3.2 oz)        Vitals :   Vitals:    06/21/25 0945 06/21/25 1000 06/21/25 1015 06/21/25 1030   BP: (!) 105/51 97/80  103/64   Pulse: 70 82 83 60   Resp: 10 16 11 (!) 9   Temp: 98.6 °F (37 °C)      TempSrc:       SpO2: 99% 97% 98% 98%   Weight:       Height:           Physical examination  General Appearance: awake  Mouth/Throat: Oral mucosa moist  Neck: No JVD  Lungs: Air entry B/L diminished  Heart:  S1, S2 heard  GI: soft, non-tender, no guarding      Medications:  Infusion:    sodium chloride      sodium chloride Stopped (06/19/25 0807)    sodium chloride      sodium chloride      norepinephrine 1 mcg/min (06/21/25 1044)    sodium chloride      dextrose       Meds:    vancomycin  750 mg IntraVENous Q24H    sodium chloride  500 mL IntraVENous Once    DESMOpressin  0.24 mcg SubCUTAneous BID    insulin glargine  5 Units SubCUTAneous Daily    pantoprazole  40 mg IntraVENous Daily    hydrocortisone sodium succinate PF  100 mg IntraVENous Q8H    piperacillin-tazobactam  3,375 mg IntraVENous Q8H    vancomycin (VANCOCIN) intermittent dosing (placeholder)   Other RX Placeholder    sodium chloride flush  5-40 mL IntraVENous 2 times per day    insulin 
[facility]   Nephrology progress note  6/23/2025, 11:17 AM      Pt Name:    Radha Yousif  MRN:     [id number]     YOB: 1980  Admit Date:    6/12/2025  2:41 AM    Chief Complaint: Nephrology following for ADAM, hypercalcemia, hypernatremia.    Subjective:  Patient was seen and examined this morning.   Late entry.   Transferred out of ICU.   Bp stable.    Objective:  24HR INTAKE/OUTPUT:    Intake/Output Summary (Last 24 hours) at 6/23/2025 1117  Last data filed at 6/23/2025 0857  Gross per 24 hour   Intake 640.03 ml   Output 535 ml   Net 105.03 ml      Admission weight: 52.4 kg (115 lb 8.3 oz)  Wt Readings from Last 3 Encounters:   06/23/25 62.2 kg (137 lb 2 oz)   04/08/25 53.1 kg (117 lb)   02/11/25 51.8 kg (114 lb 3.2 oz)        Vitals :   Vitals:    06/22/25 1945 06/22/25 2347 06/23/25 0315 06/23/25 0857   BP: 117/78 104/79 134/81 135/83   Pulse: 51 54 59 62   Resp: 18 18 18 18   Temp: 97.8 °F (36.6 °C) 97.5 °F (36.4 °C) 98 °F (36.7 °C) 99 °F (37.2 °C)   TempSrc: Oral Oral Oral Oral   SpO2: 96% 99% 99% 98%   Weight:   62.2 kg (137 lb 2 oz)    Height:           Physical examination  General Appearance: awake  Mouth/Throat: Oral mucosa moist  Neck: No JVD  Lungs: Air entry B/L diminished  Heart:  S1, S2 heard  GI: soft, non-tender, no guarding      Medications:  Infusion:    sodium chloride      dextrose       Meds:    DESMOpressin  50 mcg Oral BID    lactulose  20 g Oral BID    insulin glargine  5 Units SubCUTAneous Daily    hydrocortisone sodium succinate PF  100 mg IntraVENous Q8H    piperacillin-tazobactam  3,375 mg IntraVENous Q8H    sodium chloride flush  5-40 mL IntraVENous 2 times per day    insulin lispro  0-8 Units SubCUTAneous 4x Daily AC & HS    [Held by provider] hydrocortisone sodium succinate PF  25 mg IntraVENous Q12H    [Held by provider] heparin (porcine)  5,000 Units SubCUTAneous 3 times per day    chlorhexidine  15 mL Mouth/Throat BID    [Held by provider] 
[facility]   Nephrology progress note  6/24/2025, 2:36 PM      Pt Name:    Radha Yousif  MRN:     [id number]     YOB: 1980  Admit Date:    6/12/2025  2:41 AM    Chief Complaint: Nephrology following for ADAM, hypercalcemia, hypernatremia.    Subjective:  Patient was seen and examined this morning.   No new events.    Objective:  24HR INTAKE/OUTPUT:    Intake/Output Summary (Last 24 hours) at 6/24/2025 1436  Last data filed at 6/24/2025 1416  Gross per 24 hour   Intake 3355.32 ml   Output 2080 ml   Net 1275.32 ml      Admission weight: 52.4 kg (115 lb 8.3 oz)  Wt Readings from Last 3 Encounters:   06/24/25 64.8 kg (142 lb 13.7 oz)   04/08/25 53.1 kg (117 lb)   02/11/25 51.8 kg (114 lb 3.2 oz)        Vitals :   Vitals:    06/24/25 0327 06/24/25 0430 06/24/25 0754 06/24/25 1120   BP: 110/74  (!) 134/95 120/88   Pulse: 78  70 81   Resp: 18  18 18   Temp: 98 °F (36.7 °C)  98.4 °F (36.9 °C) 98 °F (36.7 °C)   TempSrc: Axillary  Oral Oral   SpO2: 97%  100% 98%   Weight:  64.8 kg (142 lb 13.7 oz)     Height:           Physical examination  General Appearance: awake  Mouth/Throat: Oral mucosa moist  Neck: No JVD  Lungs: Air entry B/L diminished +chest tube  Heart:  S1, S2 heard  GI: soft, non-tender, no guarding      Medications:  Infusion:    sodium chloride      dextrose       Meds:    [START ON 6/25/2025] DESMOpressin  50 mcg Oral Daily    lidocaine  1 patch TransDERmal Daily    levothyroxine  50 mcg PEG Tube Daily    hydrocortisone sodium succinate PF  50 mg IntraVENous Q12H    insulin glargine  5 Units SubCUTAneous Daily    piperacillin-tazobactam  3,375 mg IntraVENous Q8H    sodium chloride flush  5-40 mL IntraVENous 2 times per day    insulin lispro  0-8 Units SubCUTAneous 4x Daily AC & HS    [Held by provider] heparin (porcine)  5,000 Units SubCUTAneous 3 times per day    chlorhexidine  15 mL Mouth/Throat BID    [Held by provider] ferrous sulfate  325 mg Oral BID WC    FLUoxetine 
[facility]   Nephrology progress note  6/25/2025, 4:09 PM      Pt Name:    Radha Yousif  MRN:     [id number]     YOB: 1980  Admit Date:    6/12/2025  2:41 AM    Chief Complaint: Nephrology following for ADAM, hypercalcemia, hypernatremia.    Subjective:  Patient was seen and examined this morning.   Late entry.   She had had worsened polyuria today already 4.8 liters urine output.    Objective:  24HR INTAKE/OUTPUT:    Intake/Output Summary (Last 24 hours) at 6/25/2025 1609  Last data filed at 6/25/2025 1507  Gross per 24 hour   Intake 1886 ml   Output 7150 ml   Net -5264 ml      Admission weight: 52.4 kg (115 lb 8.3 oz)  Wt Readings from Last 3 Encounters:   06/25/25 65.2 kg (143 lb 11.8 oz)   04/08/25 53.1 kg (117 lb)   02/11/25 51.8 kg (114 lb 3.2 oz)        Vitals :   Vitals:    06/25/25 0453 06/25/25 0808 06/25/25 1104 06/25/25 1507   BP: (!) 133/97 (!) 136/91 (!) 128/90 135/86   Pulse: 68 75 82 73   Resp: 18 18 18 18   Temp: 98.9 °F (37.2 °C) 98.4 °F (36.9 °C) 98.8 °F (37.1 °C) 98.8 °F (37.1 °C)   TempSrc: Oral Oral Oral Oral   SpO2: 100% 100% 100% 98%   Weight:       Height:           Physical examination  General Appearance: awake  Mouth/Throat: Oral mucosa moist  Neck: No JVD  Lungs: Air entry B/L diminished +chest tube  Heart:  S1, S2 heard  GI: soft, non-tender, no guarding      Medications:  Infusion:    sodium chloride      dextrose       Meds:    DESMOpressin  50 mcg Oral BID    lidocaine  1 patch TransDERmal Daily    levothyroxine  50 mcg PEG Tube Daily    hydrocortisone sodium succinate PF  50 mg IntraVENous Q12H    insulin glargine  5 Units SubCUTAneous Daily    piperacillin-tazobactam  3,375 mg IntraVENous Q8H    sodium chloride flush  5-40 mL IntraVENous 2 times per day    insulin lispro  0-8 Units SubCUTAneous 4x Daily AC & HS    [Held by provider] heparin (porcine)  5,000 Units SubCUTAneous 3 times per day    chlorhexidine  15 mL Mouth/Throat BID    [Held by 
[facility]   Nephrology progress note  6/26/2025, 11:45 AM      Pt Name:    Radha Yousif  MRN:     [id number]     YOB: 1980  Admit Date:    6/12/2025  2:41 AM    Chief Complaint: Nephrology following for ADAM, hypercalcemia, hypernatremia.    Subjective:  Patient was seen and examined this morning.   Chest tube was removed.   Urine output 6.2 liters/24 hours.    Objective:  24HR INTAKE/OUTPUT:    Intake/Output Summary (Last 24 hours) at 6/26/2025 1145  Last data filed at 6/26/2025 0544  Gross per 24 hour   Intake 1467 ml   Output 3400 ml   Net -1933 ml      Admission weight: 52.4 kg (115 lb 8.3 oz)  Wt Readings from Last 3 Encounters:   06/26/25 65 kg (143 lb 4.8 oz)   04/08/25 53.1 kg (117 lb)   02/11/25 51.8 kg (114 lb 3.2 oz)        Vitals :   Vitals:    06/25/25 2100 06/25/25 2323 06/26/25 0333 06/26/25 0839   BP: (!) 131/91 132/87 119/84 (!) 144/95   Pulse: 67 86 87 74   Resp: 18 16 16 16   Temp: 98.4 °F (36.9 °C) 99.2 °F (37.3 °C) 99.2 °F (37.3 °C) 98.4 °F (36.9 °C)   TempSrc: Oral Oral Oral Oral   SpO2: 99% 100% 97% 100%   Weight:   65 kg (143 lb 4.8 oz)    Height:           Physical examination  General Appearance: awake  Mouth/Throat: Oral mucosa moist  Neck: No JVD  Lungs: diminished  Heart:  S1, S2 heard  GI: soft, non-tender, no guarding      Medications:  Infusion:    sodium chloride      dextrose       Meds:    DESMOpressin  50 mcg Oral BID    lidocaine  1 patch TransDERmal Daily    levothyroxine  50 mcg PEG Tube Daily    hydrocortisone sodium succinate PF  50 mg IntraVENous Q12H    insulin glargine  5 Units SubCUTAneous Daily    sodium chloride flush  5-40 mL IntraVENous 2 times per day    insulin lispro  0-8 Units SubCUTAneous 4x Daily AC & HS    [Held by provider] heparin (porcine)  5,000 Units SubCUTAneous 3 times per day    chlorhexidine  15 mL Mouth/Throat BID    [Held by provider] ferrous sulfate  325 mg Oral BID WC    FLUoxetine  40 mg Oral Daily    
[facility]   Nephrology progress note  6/27/2025, 10:57 AM      Pt Name:    Radha Yousif  MRN:     [id number]     YOB: 1980  Admit Date:    6/12/2025  2:41 AM    Chief Complaint: Nephrology following for ADAM, hypercalcemia, hypernatremia.    Subjective:  Patient was seen and examined this morning.   No new events.       Objective:  24HR INTAKE/OUTPUT:    Intake/Output Summary (Last 24 hours) at 6/27/2025 1057  Last data filed at 6/27/2025 0518  Gross per 24 hour   Intake 180 ml   Output 1000 ml   Net -820 ml      Admission weight: 52.4 kg (115 lb 8.3 oz)  Wt Readings from Last 3 Encounters:   06/27/25 65.7 kg (144 lb 13.5 oz)   04/08/25 53.1 kg (117 lb)   02/11/25 51.8 kg (114 lb 3.2 oz)        Vitals :   Vitals:    06/26/25 2002 06/26/25 2343 06/27/25 0518 06/27/25 0810   BP: (!) 139/97 (!) 152/95 (!) 136/90 (!) 133/90   Pulse: 90 91 72 86   Resp: 18 19 18 18   Temp: 98.5 °F (36.9 °C) 98.2 °F (36.8 °C) 98.1 °F (36.7 °C) 98.3 °F (36.8 °C)   TempSrc: Oral Oral Oral Oral   SpO2: 98% 95% 99% 98%   Weight:   65.7 kg (144 lb 13.5 oz)    Height:           Physical examination  General Appearance: awake  Mouth/Throat: Oral mucosa moist  Neck: No JVD  Lungs: diminished  Heart:  S1, S2 heard  GI: soft, non-tender, no guarding  Ext: no significant edema      Medications:  Infusion:    sodium chloride      dextrose       Meds:    DESMOpressin  50 mcg Oral BID    lidocaine  1 patch TransDERmal Daily    levothyroxine  50 mcg PEG Tube Daily    hydrocortisone sodium succinate PF  50 mg IntraVENous Q12H    insulin glargine  5 Units SubCUTAneous Daily    sodium chloride flush  5-40 mL IntraVENous 2 times per day    insulin lispro  0-8 Units SubCUTAneous 4x Daily AC & HS    [Held by provider] heparin (porcine)  5,000 Units SubCUTAneous 3 times per day    chlorhexidine  15 mL Mouth/Throat BID    [Held by provider] ferrous sulfate  325 mg Oral BID WC    FLUoxetine  40 mg Oral Daily    midodrine  
[facility]   Nephrology progress note  6/28/2025, 2:41 PM      Pt Name:    Radha Yousif  MRN:     [id number]     YOB: 1980  Admit Date:    6/12/2025  2:41 AM    Chief Complaint: Nephrology following for ADAM, hypercalcemia, hypernatremia.    Subjective:  Patient was seen and examined this morning.   No new events.  Steroids being tapered with possible DC tomorrow.      Objective:  24HR INTAKE/OUTPUT:    Intake/Output Summary (Last 24 hours) at 6/28/2025 1441  Last data filed at 6/28/2025 1332  Gross per 24 hour   Intake 900.08 ml   Output 1000 ml   Net -99.92 ml      Admission weight: 52.4 kg (115 lb 8.3 oz)  Wt Readings from Last 3 Encounters:   06/28/25 66 kg (145 lb 8.1 oz)   04/08/25 53.1 kg (117 lb)   02/11/25 51.8 kg (114 lb 3.2 oz)        Vitals :   Vitals:    06/28/25 0354 06/28/25 0401 06/28/25 0743 06/28/25 1145   BP:  (!) 133/92 (!) 135/98 (!) 149/94   Pulse:  86 85 91   Resp:  16 17 17   Temp:  98.1 °F (36.7 °C) 98.6 °F (37 °C) 99.3 °F (37.4 °C)   TempSrc:  Oral Oral Oral   SpO2:  95% 97% 94%   Weight: 66 kg (145 lb 8.1 oz)      Height:           Physical examination  General Appearance: awake  Mouth/Throat: Oral mucosa moist  Neck: No JVD  Lungs: diminished  Heart:  S1, S2 heard  GI: soft, non-tender, no guarding  Ext: no significant edema      Medications:  Infusion:    sodium chloride      dextrose       Meds:    hydrocortisone  25 mg Oral BID    DESMOpressin  50 mcg Oral BID    lidocaine  1 patch TransDERmal Daily    levothyroxine  50 mcg PEG Tube Daily    insulin glargine  5 Units SubCUTAneous Daily    sodium chloride flush  5-40 mL IntraVENous 2 times per day    insulin lispro  0-8 Units SubCUTAneous 4x Daily AC & HS    [Held by provider] heparin (porcine)  5,000 Units SubCUTAneous 3 times per day    chlorhexidine  15 mL Mouth/Throat BID    [Held by provider] ferrous sulfate  325 mg Oral BID WC    FLUoxetine  40 mg Oral Daily    midodrine  10 mg Per G Tube TID 
[facility]  STRZ CCU-STEPDOWN 3B  Occupational Therapy  Daily Note    Discharge Recommendations: Subacute/skilled nursing facility  Equipment Recommendations: No         Time In: 0936  Time Out: 1031  Timed Code Treatment Minutes: 55 Minutes  Minutes: 55          Date: 2025  Patient Name: Radha Yousif,   Gender: female      Room: 3B-38/038-A  MRN: [id number]  : 1980  (44 y.o.)  Referring Practitioner: Rhys Patiño MD  Diagnosis: ADAM (acute kidney injury)  Additional Pertinent Hx: Per MD H & P:44 y.o. female seen in renal consult for ADAM, hypercalcemia, hypernatremia.  She has a history of CVA with aphasia, chronic PEG tube, DM, hypotension on midodrine, HLD, CKD II, hypercalcemia.  She presented from [facility] with severe hypercalcemia of 17 and ADAM.  History obtained from mother, pt too altered.  She states for roughly the past week has been having decreased mentation and unable to communicate like she normally does.  She has had a history of hypercalcemia. Dialysis Catheter insertion on     Restrictions/Precautions:  Restrictions/Precautions: Fall Risk  Position Activity Restriction  Other Position/Activity Restrictions: hx of CVA  with residual L hemibody weakness & aphasia      Social/Functional History:  Type of Home: Facility ([facility]  Home Layout: One level  Home Access: Level entry           Prior Level of Assist for ADLs: Needs assistance  Prior Level of Assist for Transfers: Needs assistance  Prior Level of Assist for Ambulation: Non-ambulatory - confined to hopsital bed          Additional Comments: Pt's mother reports Pt was pivoting to w/c, however in the last 6 months has been using a erick.    SUBJECTIVE: Patient laying in bed upon arrival; agreeable to therapy this date.  Patient pleasant and cooperative throughout.  Mother present end of session.  Notified nursing of patient not having PT on caseload, possible orders to be placed.    PAIN: 
[facility]  SPEECH THERAPY  STRZ 6A PEDI/MED SURG  Clinical Swallow Evaluation    Discharge Recommendations: SNF  DIET ORDER RECOMMENDATIONS AFTER EVALUATION: Puree diet/thin liquids (+PEG)   Strategies:  Full Upright Position, Small Bite/Sip, Medications Whole with Thin, Alternate Solids and Liquids, Limit Distractions, and Monitor for Fatigue     SLP Individual Minutes  Time In: 1458  Time Out: 1515  Minutes: 17  Timed Code Treatment Minutes: 0 Minutes       Date: 2025  Patient Name: Radha Yousif      CSN: [id number]   : 1980  (44 y.o.)  Gender: female   Referring Physician:  Helena Ramon PA-C  Diagnosis: ADAM (acute kidney injury)    History of Present Illness/Injury: Patient presented to [facility] with the above medical dx. Refer to physician H&P: \"Radha Yousif is a 44 y.o. female who presents to [facility] with fatigue, altered mentation from baseline. Patient is not a reliable historian and no additional historian present at time of assessment. Per reports primarily from mother, patient has been more fatigued since last Thursday and decreased capacity to communicate/express herself. She was found to have a significantly increased creatinine and calcium levels. Patient did indicate some pain in her legs according to mother. Patient was stable after arrival to [facility] and did not appear to have any pain or distress on assessment.\"    CT Chest 06/15/2025:  IMPRESSION:  1. Innumerable nodular densities are seen throughout the mid and lower lung  fields. This could be related to an infectious process. Short-term follow-up  chest CT is recommended to ensure resolution.  2. Splenomegaly      Past Medical History:   Diagnosis Date    Depression     OCD (obsessive compulsive disorder)     Type II or unspecified type diabetes mellitus without mention of complication, not stated as uncontrolled        SUBJECTIVE:  CHANDNI Stanford provided approval to proceed with 
[facility]   Pharmacy Pharmacokinetic Monitoring Service - Vancomycin    Indication: sepsis unknown etiology  Target Concentration: Dosing based on anticipated concentration < 15-20 mg/L due to ADAM  Day of Therapy: 1  Additional Antimicrobials: Zosyn    Pertinent Laboratory Values:   Wt Readings from Last 1 Encounters:   06/19/25 60.5 kg (133 lb 6.1 oz)     Temp Readings from Last 1 Encounters:   06/19/25 99.5 °F (37.5 °C)     Estimated Creatinine Clearance: 39 mL/min (A) (based on SCr of 1.5 mg/dL (H)).  Recent Labs     06/19/25  0101 06/19/25  0600 06/19/25  0845 06/19/25  1400   CREATININE 1.7*  --  1.5* 1.5*   BUN 35*  --  32* 31*   WBC 33.4* 25.8*  --   --      Pertinent Cultures:  Date Source Results   6/19/25 Blood x 2 pending   6/19/25 Ascitic fluid pending   MRSA Nasal Swab: was ordered by provider, awaiting results.    Recent vancomycin administrations                     vancomycin (VANCOCIN) 1250 mg in sodium chloride 0.9% 250 mL IVPB (mg) 1,250 mg New Bag 06/19/25 0458                  Assessment:  Date/Time Current Dose Concentration Timing of Concentration   6/19/25 1250 mg x1 13 ~ 13 hr   Note: Serum concentrations collected for AUC dosing may appear elevated if collected in close proximity to the dose administered, this is not necessarily an indication of toxicity    Plan:  Concentration-guided dosing due to renal impairment/insufficiency  Vancomycin 750 mg (12.4 mg/kg) x1 dose ordered due to ADAM.   Repeat vancomycin concentration ordered for tomorrow at 6/20/25 at 1200  Pharmacy will monitor renal function and adjust therapy as indicated.    Thank you for the consult,  Shabana Olivarez, PharmD 6/19/2025 7:47 PM      
[facility]   Pharmacy Pharmacokinetic Monitoring Service - Vancomycin    Indication: sepsis of unknown etiology  Target Concentration: Goal AUC/STAN 400-600 mg*hr/L  Day of Therapy: 2  Additional Antimicrobials: zosyn    Pertinent Laboratory Values:   Wt Readings from Last 1 Encounters:   06/20/25 59.1 kg (130 lb 4.7 oz)     Temp Readings from Last 1 Encounters:   06/20/25 98.4 °F (36.9 °C)     Estimated Creatinine Clearance: 41 mL/min (A) (based on SCr of 1.4 mg/dL (H)).  Recent Labs     06/19/25  2312 06/20/25  0407 06/20/25  0745   CREATININE 1.3*  --  1.4*   BUN 25*  --  28*   WBC  --  34.2* 33.2*     Pertinent Cultures:  Date Source Results   6/19/25 Blood x 2 NGTD   6/19/25 Ascitic fluid No bacteria seen     Recent vancomycin administrations                     vancomycin (VANCOCIN) 750 mg in sodium chloride 0.9 % 250 mL IVPB (mg) 750 mg New Bag 06/19/25 2046    vancomycin (VANCOCIN) 1250 mg in sodium chloride 0.9% 250 mL IVPB (mg) 1,250 mg New Bag 06/19/25 0458        Assessment:  Date/Time Current Dose Concentration Timing of Concentration AUC C trough,ss   6/20 1205 intermittent 14.3 ~ 15.5 hr 583 16.2   Note: Serum concentrations collected for AUC dosing may appear elevated if collected in close proximity to the dose administered, this is not necessarily an indication of toxicity    Plan:  Vancomycin 750 mg IV every 18 hours.  Repeat vancomycin concentration planned in next 48-72 hours  Pharmacy will monitor renal function and adjust therapy as indicated.    Thank you for the consult,  Reyna Mckeon, PharmD, BCPS, BCCCP  6/20/2025 1:15 PM  
[facility]   Pharmacy Pharmacokinetic Monitoring Service - Vancomycin    Indication: Sepsis  Target Concentration: Goal AUC/STAN 400-600 mg*hr/L  Day of Therapy: 3  Additional Antimicrobials: Zosyn    Pertinent Laboratory Values:   Wt Readings from Last 1 Encounters:   06/21/25 62.1 kg (136 lb 14.5 oz)     Temp Readings from Last 1 Encounters:   06/21/25 97.7 °F (36.5 °C)     Estimated Creatinine Clearance: 42 mL/min (A) (based on SCr of 1.4 mg/dL (H)).  Recent Labs     06/19/25  2312 06/20/25  0407 06/20/25  0745 06/21/25  0600   CREATININE 1.3*  --  1.4*  --    BUN 25*  --  28*  --    WBC  --    < > 33.2* 24.1*    < > = values in this interval not displayed.     Pertinent Cultures:  Date Source Results   6/19/25 Blood x 2 NGTD   6/19/25 Pleural fluid No bacteria seen   MRSA Nasal Swab: N/A. Non-respiratory infection.    Recent vancomycin administrations                     vancomycin (VANCOCIN) 750 mg in sodium chloride 0.9 % 250 mL IVPB (mg) 750 mg New Bag 06/20/25 1637    vancomycin (VANCOCIN) 750 mg in sodium chloride 0.9 % 250 mL IVPB (mg) 750 mg New Bag 06/19/25 2046    vancomycin (VANCOCIN) 1250 mg in sodium chloride 0.9% 250 mL IVPB (mg) 1,250 mg New Bag 06/19/25 0458                  Assessment:  Date/Time Current Dose Concentration Timing of Concentration AUC C trough,ss   6/21/25 750 mg IVPB Q18H 18.7 mcg/mL ~ 13.5 hours after last dose 630 18 mcg/mL   Note: Serum concentrations collected for AUC dosing may appear elevated if collected in close proximity to the dose administered, this is not necessarily an indication of toxicity    Plan:  Current dosing regimen is supra-therapeutic  Decrease dose to Vancomycin 750 mg IVPB Q24H for estimated AUC of 492 & trough of 13 mcg/mL  Repeat vancomycin concentration planned in next 48-72 hours  Pharmacy will monitor renal function and adjust therapy as indicated.    Thank you for the consult,    Buddy Gonzales, WenD, BCPS  6/21/2025  9:12 AM      
[facility]   Pharmacy Pharmacokinetic Monitoring Service - Vancomycin     Radha Yousif is a 44 y.o. female starting on vancomycin therapy for sepsis of unknown etiology. Pharmacy consulted by Dr. Whyte for monitoring and adjustment.    Target Concentration: Dosing based on anticipated concentration < 15-20 mg/L due to ADAM    Additional Antimicrobials: zosyn    Pertinent Laboratory Values:   Wt Readings from Last 1 Encounters:   06/19/25 60.5 kg (133 lb 6.1 oz)     Estimated Creatinine Clearance: 34 mL/min (A) (based on SCr of 1.7 mg/dL (H)).  Recent Labs     06/18/25  1419 06/19/25  0101   CREATININE 1.1* 1.7*     Pertinent Cultures:  Date Source Results   6/19/25 Blood x 2 pending   6/19/25 Ascitic fluid pending   MRSA Nasal Swab: was ordered by provider, awaiting results.    Plan:  Concentration-guided dosing due to renal impairment/insufficiency  Patient received vancomycin 1250 mg (20 mg/kg) this morning at 0458. Given ADAM, will order random level tonight   Renal labs as indicated   Vancomycin concentration ordered for this evening  Pharmacy will monitor renal function and adjust therapy as indicated    Thank you for the consult,  Reyna Mckeon, PharmD, BCPS, BCCCP  6/19/2025 11:25 AM  
[facility]--HOSPITALIST GROUP    Hospitalist PROGRESS NOTE dictated by Reese Ford MD on 2025    Patient ID: Radha Yousif is 44 y.o. and presently in room [facility]/038-A  : 1980 MRN: [id number]    Admit date: 2025  Primary Care Physician: Miriam Coppola MD   Patient Care Team:  Miriam Coppola MD as PCP - General (Family Medicine)  Mir[facility]  Tel: [phone]  Admitting Physician: Anival Talamantes MD   Code Status:  Full Code    Radha Yousif is a 44 y.o.  female who presented with No chief complaint on file.    Room: [facility]038-A  Admit date: 2025       History Of Present Illness:    Radha Yousif is a 44 y.o. 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  Since admission, patient has had dialysis catheter placed by IR and undergone dialysis with nephrology and since had dialysis catheter removed.  Neurosurgery has seen for stable meningioma and will only follow peripherally at this time.  Pulmonology and endocrinology have been working up her hypercalcemia, including bronc with biopsy left lower lobe, EBUS with biopsy of 3 lymph nodes performed by Dr. Malone on 2025.  On the evening of , patient noted to be hypotensive and rapid response was called.  Despite fluid resuscitation, patient required Levophed administration and transferred to the ICU.       2025: Patient with sarcoidosis, diabetes insipidus with mother at bedside nods no when asked if he has any pain.  Patient pleasant and appears comfortable.      PEG fell out so an order placed with IR for replacement.  Bazzi placed instead of PEG at present time for medication administration.      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  Endocrinology 
Orders received per Dr. Figueroa to remove temporary dialysis catheter. Procedure explained to patient. Patient placed in supine position for procedure and for 30 min post removal. Dressing removed. Site cleansed and sutures removed. Tessio removed and pressure held on site for 10 minutes. Clean dressing applied to site with biopatch. Patient informed to call nurse if bleeding occurs, s/s of infection discussed. All questions answered at time of removal. Primary RN notified.    
Patient arrived to unit from  via stretcher. Patient transferred to ICU bed and placed on continuous ICU bedside monitor. Patient admitted for Acute renal failure [N17.9]  Liver cancer (HCC) [C22.9]  Hypercalcemia [E83.52]  ARF (acute renal failure) [N17.9]. Vitals obtained. See flowsheets. Patient's IV access includes 22 RFA. Current infusions and rates of infusion include LR at 999 and levo at 5. Assessment completed by Marleny TARIQ. Two nurse skin assessment completed by Marleny TARIQ and Snow TARIQ. See flowsheets for assessment details. Policies and procedures of ICU unable to be explained to patient at this time. Family member(s)/representative(s) present at time of admission include mother. Patient rights explained to family member(s)/representatives and patient, as able. Patient/patient's family member(s)/representative(s) N/A to have physician notified of their admission. All questions posed by patient's family member(s)/representative(s) and patient answered at this time.     
Patient:  Radha Yousif    Unit/Bed:4D-05/005-A  MRN: [id number]   PCP: Miriam Coppola MD  Date of Admission: 6/12/2025    Assessment and Plan(All pulmonary edema, renal failure, PE, and respiratory failure diagnoses are acute in nature unless otherwise specified):        Left hemothorax.:  Left chest tube placed with ultrasound guidance on 6/19/2025.  Old dark blood was removed.  First 24 hours had 2730 cc withdrawn.  Since that time, flow has markedly slowed down.  Chest tube was flushed on 6/20/2025 and is patent.  However, significant flow from chest tube has not been established. POCUS shows small free flowing fluid and what looks to be clotted blood.  Will obtain CT chest to differentiate.  May benefit from low dose staggered intrathoracic thrombolytic (TNK 10 mg) administration.  Anemia of acute blood loss: Secondary to EBUS with 5 g hemoglobin drop and left hemothorax.  Bleeding appears to have stopped.  Requiring pressors.  Post transfusion.  Trend H&H.  Elevated white blood cell count: Increasing.  No identified source of sepsis.  Cultures negative.  Continue to trend.  On Zosyn.  Hypercalcemia: Concern for sarcoidosis.  Post lymph node biopsy.  Pathologic interpretation remains pending.  Specimen was collected 6/16/2025.  Renal failure: Per nephrology.  Central diabetes insipidus.:  DDAVP on hold until urine output increases.  Continue to trend serum sodium.  Hypotension: Secondary to hemorrhage into the left thoracic cavity.  Still on pressors.  Will administer packed red blood cells..  History of adrenal insufficiency: Steroid supplementation.  Chronically on midodrine.  Dysphagia: Secondary to remote CVA.  On PEG tube.  Diabetes mellitus: Sliding scale insulin.    CC: Left hemothorax  HPI: Patient is a 44-year-old white female with type 2 diabetes mellitus, obsessive-compulsive disorder and depression.  Patient has a history of multiple CVAs with resultant left sided paraplegia, aphasia, dysphagia 
Patients chem was 472 at 2100, gave lantus and sliding scale. Sitter said that patient was unresponsive, went into remove and patient was waking up, BP 73/53 Hr 103 rechecked chem 482. Messaged the hospitalist, called rapid. Patient BP went down to 69/52, moved patient to 3b32.  
Pt admitted to 6a 15 as transfer from 4k 28, mother at bedside iv infusing into right forearm, peg tube connected to pump free water flushes programmed at 100ml every hour. Hemodialysis catheter in place in right side on neck blue and red lumen capped Pt alert to self. Able to voice name unable to voice location date or year.   
[facility] today[facility] Plan transportation for tomorrow 6/30 at 1130.  
Report called to [facility]. Spoke To Maggi. Attempted to fax papers. Discussed discharge summary. Instructed patient about medications & follow up appointments. Patient denies any additional questions. Patient was discharged with all belongings. No distress noted. Patient discharged to [facility]. Taken by stretcher via EMS.    
[facility]  SPEECH THERAPY MISSED TREATMENT NOTE  STRZ 6A PEDI/MED SURG      Date: 2025  Patient Name: Radha Yousif        MRN: [id number]    : 1980  (44 y.o.)    REASON FOR MISSED TREATMENT:    CHANDNI Fiore reporting patient with strong desire to eat by mouth; however, unable to complete PO trials at this time d/t NPO status for bronchoscopy this PM. Per chart review, patient with PEG ~5 years with apparent supplemental pleasure feeds with diet level that reflects puree/mildly thick liquids (as of AT LEAST 10/2024). Plan to re-attempt CSE on subsequent date (via mildly thick liquid trials); with consistent swallow trigger, suggestion for completion of MBS to further assist in POC + progression of diet level.       Maryjane Reina M.A., CF-SLP  [id number]              
[facility]  STRZ 6A PEDI/MED SURG  Occupational Therapy  Daily Note    Discharge Recommendations: Subacute/skilled nursing facility  Equipment Recommendations: No         Time In: 1735  Time Out: 1800  Timed Code Treatment Minutes: 25 Minutes  Minutes: 25          Date: 2025  Patient Name: Radha Yousif,   Gender: female      Room: 6A-15/015-A  MRN: [id number]  : 1980  (44 y.o.)  Referring Practitioner: Rhys Patiño MD  Diagnosis: ADAM (acute kidney injury)  Additional Pertinent Hx: Per MD H & P:44 y.o. female seen in renal consult for ADAM, hypercalcemia, hypernatremia.  She has a history of CVA with aphasia, chronic PEG tube, DM, hypotension on midodrine, HLD, CKD II, hypercalcemia.  She presented from [facility] with severe hypercalcemia of 17 and ADAM.  History obtained from mother, pt too altered.  She states for roughly the past week has been having decreased mentation and unable to communicate like she normally does.  She has had a history of hypercalcemia. Dialysis Catheter insertion on     Restrictions/Precautions:  Restrictions/Precautions: Fall Risk  Position Activity Restriction  Other Position/Activity Restrictions: hx of CVA  with residual L hemibody weakness & aphasia; live sitter present     Social/Functional History:  Type of Home: Facility ([facility]  Home Layout: One level  Home Access: Level entry           Prior Level of Assist for ADLs: Needs assistance  Prior Level of Assist for Transfers: Needs assistance  Prior Level of Assist for Ambulation: Non-ambulatory - confined to hopsital bed          Additional Comments: Pt's mother reports Pt was pivoting to w/c, however in the last 6 months has been using a erick.    SUBJECTIVE: Pleasant.  Tearfulness noted during session.  RN approved session.  Pt agreed to sit up at the edge of the bed to do some grooming initially.  She had incontinence of bowel.  After having rolled in bed, she started to get 
Spiritual Health History and Assessment/Progress Note  [facility]    Follow-up,  ,  ,      Name: Radha Yousif MRN: [id number]    Age: 44 y.o.     Sex: female   Language: English   Restoration: Latter-day   ADAM (acute kidney injury)     Date: 6/23/2025            Total Time Calculated: (P) 6 min              Spiritual Assessment began in [facility] CCU[facility]PD[facility] 3B        Referral/Consult From: Rounding   Encounter Overview/Reason: Follow-up  Service Provided For: Patient    Lise, Belief, Meaning:   Patient identifies as spiritual  Family/Friends No family/friends present      Importance and Influence:  Patient has spiritual/personal beliefs that influence decisions regarding their health  Family/Friends No family/friends present    Community:  Patient Other: None at this time  Family/Friends No family/friends present    Assessment and Plan of Care:     Patient Interventions include: Facilitated expression of thoughts and feelings  Family/Friends Interventions include: No family/friends present    Patient Plan of Care: Spiritual Care available upon further referral  Family/Friends Plan of Care: No family/friends present    Electronically signed by ARIK Curtis on 6/23/2025 at 8:07 PM   
Transfer  Report from Facility [facility]  Referring Physician Dr Thomas  Accepting Physician Dr Dave  Patient Condition:  80. 43 yo at [facility] presents to ER per Mother she was not acting herself. She lives in [place] and has a history of CVA in . She does have some left sided deficits but since Thursday has been more sleepy and is having difficulty expressing her feelings. Per her Mother, you can usually understand her. She has a history of Hypercalcemia, today it is 17.9, creat is 4.5, Alk phos is 214, BUN 62. A fowler was placed with minimal output. She was able to express pain in her legs by pointing to them when asked if she had pain. CXR(-), urine (-), CT of brain done shows some lucency in the right parietal region possible for metastatic lesions, and CT of Abd without contrast shows splenomegaly with low density lesions on the spleen. She was treated with 2L NS, Vitals afebrile, HR 90, resp 20, 125/107, 98% on RA.     Accepted on behalf of Dr Dave with diagnosis of ARF      
[facility]--HOSPITALIST GROUP    Hospitalist PROGRESS NOTE dictated by Reese Ford MD on 2025    Patient ID: Radha Yousif is 44 y.o. and presently in room [facility]/038-A  : 1980 MRN: [id number]    Admit date: 2025  Primary Care Physician: Miriam Coppola MD   Patient Care Team:  Miriam Coppola MD as PCP - General (Family Medicine)  Miriam Coppola [facility] PCP - [facility]  Tel: [phone]  Admitting Physician: Anival Talamantes MD   Code Status:  Full Code    Radha Yousif is a 44 y.o.  female who presented with No chief complaint on file.    Room: [facility]038-A  Admit date: 2025       History Of Present Illness:    Radha Yousif is a 44 y.o. 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  Since admission, patient has had dialysis catheter placed by IR and undergone dialysis with nephrology and since had dialysis catheter removed.  Neurosurgery has seen for stable meningioma and will only follow peripherally at this time.  Pulmonology and endocrinology have been working up her hypercalcemia, including bronc with biopsy left lower lobe, EBUS with biopsy of 3 lymph nodes performed by Dr. Malone on 2025.  On the evening of , patient noted to be hypotensive and rapid response was called.  Despite fluid resuscitation, patient required Levophed administration and transferred to the ICU.       2025: Patient with sarcoidosis, diabetes insipidus with mother at bedside nods no when asked if he has any pain.  Patient pleasant and appears comfortable.      PEG fell out so an order placed with IR for replacement.  Bazzi placed instead of PEG at present time for medication administration.      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  Endocrinology 
[facility]  INPATIENT OCCUPATIONAL THERAPY  STR[facility] 6A PEDI/MED SURG  EVALUATION      Discharge Recommendations: Subacute/Skilled Nursing Facility  Equipment Recommendations: No         Time In: 0738  Time Out: 0813  Timed Code Treatment Minutes: 25 Minutes  Minutes: 35          Date: 2025  Patient Name: Radha Yousif,   Gender: female      MRN: [id number]  : 1980  (44 y.o.)  Referring Practitioner: Rhys Patiño MD  Diagnosis: ADAM (acute kidney injury)  Additional Pertinent Hx: Per MD H & P:44 y.o. female seen in renal consult for ADAM, hypercalcemia, hypernatremia.  She has a history of CVA with aphasia, chronic PEG tube, DM, hypotension on midodrine, HLD, CKD II, hypercalcemia.  She presented from [facility] with severe hypercalcemia of 17 and ADAM.  History obtained from mother, pt too altered.  She states for roughly the past week has been having decreased mentation and unable to communicate like she normally does.  She has had a history of hypercalcemia. Dialysis Catheter insertion on     Restrictions/Precautions:  Restrictions/Precautions: Fall Risk  Position Activity Restriction  Other Position/Activity Restrictions: hx of CVA  with residual L hemibody weakness & aphasia    Subjective  Chart Reviewed: Yes, Orders, Progress Notes, History and Physical  Patient assessed for rehabilitation services?: Yes  Family / Caregiver Present: No    Subjective: alert in bed upon arrival of therapist. Pt's mother in room & provided info-Pt verbalized a few time (difficult to understand    Pain: unable to give #/10: reports pain in L hemibody    Vitals: Vitals not assessed per clinical judgement, see nursing flowsheet    Social/Functional History:  Type of Home: Facility ([facility]  Home Layout: One level  Home Access: Level entry           Prior Level of Assist for ADLs: Needs assistance  Prior Level of Assist for Transfers: Needs assistance  Prior Level of Assist for 
[facility]  STR[facility] ICU 4D  Occupational Therapy  Daily Note    Discharge Recommendations: Long term care with OT  Equipment Recommendations: No      Time In: 1116  Time Out: 1154  Timed Code Treatment Minutes: 38 Minutes  Minutes: 38          Date: 2025  Patient Name: Radha Yousif,   Gender: female      Room: [facility]05/005-A  MRN: [id number]  : 1980  (44 y.o.)  Referring Practitioner: Rhys Patiño MD  Diagnosis: ADAM (acute kidney injury)  Additional Pertinent Hx: Per MD H & P:44 y.o. female seen in renal consult for ADAM, hypercalcemia, hypernatremia.  She has a history of CVA with aphasia, chronic PEG tube, DM, hypotension on midodrine, HLD, CKD II, hypercalcemia.  She presented from [facility] with severe hypercalcemia of 17 and ADAM.  History obtained from mother, pt too altered.  She states for roughly the past week has been having decreased mentation and unable to communicate like she normally does.  She has had a history of hypercalcemia. Dialysis Catheter insertion on     Restrictions/Precautions:  Restrictions/Precautions: Fall Risk  Position Activity Restriction  Other Position/Activity Restrictions: hx of CVA  with residual L hemibody weakness & aphasia     Social/Functional History:  Type of Home: Facility ([facility]  Home Layout: One level  Home Access: Level entry           Prior Level of Assist for ADLs: Needs assistance  Prior Level of Assist for Transfers: Needs assistance  Prior Level of Assist for Ambulation: Non-ambulatory - confined to hopsital bed          Additional Comments: Pt's mother reports Pt was pivoting to w/c, however in the last 6 months has been using a erick.    SUBJECTIVE: Pt supine in bed upon arrival with mother present. Pt agreeable to OT session. Is able to verbalize and be understood on few occasions, otherwise uses gestures/sign language to communicate.     PAIN: Pt c/o pain at mouth due to chapped lips, and at L side of neck due to 
tarsha dash this patient's BP is 147 106,and HR is 106, Sinus tachy in tele, no pain-afebrile. her heart rate has been at high 90s during the day and BPs were 130/90s  Read 12:23 AM   6/30/25 12:25 AM  Is patient having any symptoms? It looks like the doctor put a discharge note and why did she not go home  6/30/25 12:38 AM   nope, no complaint. she's supposed to be DC but [facility] can't get her for some reason, so they will take her tomm. at 11.30  Read 12:38 AM   6/30/25 12:39 AM  ok can monitor for now unless somthing else were to change  6/30/25 12:43 AM   ok thanks  
12.5 mg Per G Tube BID    midodrine  10 mg Per G Tube TID WC    mirtazapine  7.5 mg Per G Tube Nightly    sennosides-docusate sodium  1 tablet Per G Tube BID     Meds prn: sodium chloride flush, sodium chloride, ondansetron **OR** ondansetron, polyethylene glycol, acetaminophen **OR** acetaminophen, melatonin, bisacodyl, cyclobenzaprine, aspirin-acetaminophen-caffeine, glucose, dextrose bolus **OR** dextrose bolus, glucagon (rDNA), dextrose, lidocaine     Lab Data :  CBC:   Recent Labs     06/12/25  0342   WBC 5.0   HGB 12.8   HCT 42.5        CMP:  Recent Labs     06/13/25  0923 06/13/25  1528 06/13/25  1831 06/13/25  2345 06/14/25  0738   *   < > 151* 149* 155*   K 3.1*   < > 3.4* 3.6 3.1*   *   < > 120* 118* 120*   CO2 20*   < > 18* 17* 22   BUN 18   < > 24* 26* 27*   CREATININE 2.0*   < > 2.1* 2.2* 2.1*   GLUCOSE 106   < > 334* 335* 160*   CALCIUM 9.7   < > 10.2* 10.8* 11.7*   MG 1.9  --   --   --  2.2   PHOS 2.0*  --   --   --  2.3*    < > = values in this interval not displayed.     Hepatic:   Recent Labs     06/12/25  0342   AST 39*   ALT 16   BILITOT 0.4   ALKPHOS 144*         Assessment and Plan:  ADAM likely from severe volume depletion in setting of hypernatremia and hypercalcemia causing intravascular volume depletion  Did have hemodialysis couple of days ago  Currently hypernatremia hypercalcemia  Will start the patient on aggressive D5W  Severe symptomatic hypercalcemia: non-PTH mediated.   Had recent hypercalcemia in March - saw endocrine who initiated a work up. Calcium back back down to 10.2 in April but now presented in the 17 range. Non-PTH mediated. Had elevated 1,25 Vitamin D previously.  Resent work up including PTH-rp, SPEP, 1, 25 vitamin D.    Aggressive IV fluids no dialysis today if continues to worsen we will consider in the morning  Hypernatremia from severe volume depletion  :     Aggressive IV fluids  Altered mental status  Abnormal head CT  Hx CVA with asphasia and PEG 
Mouth/Throat BID    [Held by provider] ferrous sulfate  325 mg Oral BID     FLUoxetine  40 mg Oral Daily    metoprolol tartrate  12.5 mg Per G Tube BID    midodrine  10 mg Per G Tube TID     mirtazapine  7.5 mg Per G Tube Nightly    sennosides-docusate sodium  1 tablet Per G Tube BID     Meds prn: diatrizoate meglumine-sodium, sodium chloride, dextrose bolus **OR** dextrose bolus, magnesium sulfate, sodium phosphate 15 mmol in sodium chloride 0.9 % 250 mL IVPB, sodium chloride flush, sodium chloride, morphine, sodium chloride, diatrizoate meglumine-sodium, diatrizoate meglumine-sodium, diatrizoate meglumine-sodium, sodium chloride flush, sodium chloride, ondansetron **OR** ondansetron, polyethylene glycol, acetaminophen **OR** acetaminophen, melatonin, bisacodyl, cyclobenzaprine, aspirin-acetaminophen-caffeine, glucose, glucagon (rDNA), dextrose     Lab Data :  CBC:   Recent Labs     06/20/25  0745 06/20/25  1750 06/21/25  0600 06/21/25  0915 06/22/25  0150 06/22/25  0515 06/22/25  0940   WBC 33.2*  --  24.1*  --   --  9.8  --    HGB 8.5*   < > 9.8*   < > 8.0* 7.8* 8.4*   HCT 24.8*   < > 29.1*   < > 24.4* 23.3* 25.8*     --  177  --   --  103*  --     < > = values in this interval not displayed.       CMP:  Recent Labs     06/20/25  0745 06/21/25  0915 06/21/25  1800 06/21/25  2300 06/22/25  0515    145 145 142 143   K 5.0 3.7  --   --  3.6    114*  --   --  114*   CO2 17* 17*  --   --  18*   BUN 28* 28*  --   --  26*   CREATININE 1.4* 1.4*  --   --  1.2*   GLUCOSE 202* 253*  --   --  163*   CALCIUM 7.9* 8.5*  --   --  7.7*     Hepatic:   No results for input(s): \"LABALBU\", \"AST\", \"ALT\", \"BILITOT\", \"ALKPHOS\" in the last 72 hours.    Invalid input(s): \"ALB\"          Assessment and Plan:  ADAM likely from severe volume depletion in setting of hypernatremia and hypercalcemia causing intravascular volume depletion  Did have hemodialysis last week mainly for the hypercalcemia, this has improved and 
Q8H    vancomycin (VANCOCIN) intermittent dosing (placeholder)   Other RX Placeholder    sodium chloride flush  5-40 mL IntraVENous 2 times per day    insulin lispro  0-8 Units SubCUTAneous 4x Daily AC & HS    [Held by provider] hydrocortisone sodium succinate PF  25 mg IntraVENous Q12H    insulin glargine  15 Units SubCUTAneous Nightly    sodium chloride flush  10 mL IntraVENous 2 times per day    [Held by provider] heparin (porcine)  5,000 Units SubCUTAneous 3 times per day    chlorhexidine  15 mL Mouth/Throat BID    [Held by provider] ferrous sulfate  325 mg Oral BID WC    FLUoxetine  40 mg Oral Daily    metoprolol tartrate  12.5 mg Per G Tube BID    midodrine  10 mg Per G Tube TID WC    mirtazapine  7.5 mg Per G Tube Nightly    sennosides-docusate sodium  1 tablet Per G Tube BID     Meds prn: sodium chloride, dextrose bolus **OR** dextrose bolus, potassium chloride, magnesium sulfate, sodium phosphate 15 mmol in sodium chloride 0.9 % 250 mL IVPB, dextrose 5% and 0.45% NaCl with KCl 20 mEq, sodium chloride flush, sodium chloride, morphine, diatrizoate meglumine-sodium, diatrizoate meglumine-sodium, diatrizoate meglumine-sodium, sodium chloride flush, sodium chloride, ondansetron **OR** ondansetron, polyethylene glycol, acetaminophen **OR** acetaminophen, melatonin, bisacodyl, cyclobenzaprine, aspirin-acetaminophen-caffeine, glucose, glucagon (rDNA), dextrose     Lab Data :  CBC:   Recent Labs     06/19/25  0101 06/19/25  0600   WBC 33.4* 25.8*   HGB 5.1* 9.7*   HCT 16.0* 29.0*    123*       CMP:  Recent Labs     06/18/25  1419 06/19/25  0101 06/19/25  0845   * 127* 132*   K 3.9 4.4 4.0    97* 103   CO2 18* 15* 17*   BUN 25* 35* 32*   CREATININE 1.1* 1.7* 1.5*   GLUCOSE 137* 405* 143*   CALCIUM 8.7 8.6 8.4*     Hepatic:   Recent Labs     06/19/25  0101   AST 28   ALT 19   BILITOT <0.2*   ALKPHOS 127*           Assessment and Plan:  ADAM likely from severe volume depletion in setting of 
expressive aphasia and was writing down her answers to questions.     PAIN:no facial grimacing.     Vitals: Vitals not assessed per clinical judgement, see nursing flowsheet    COGNITION: Slow Processing, Decreased Insight, Impaired Attention, and Expressive Aphasia    ADL:   No ADL's completed this session..    IADL:   Not Tested    BED MOBILITY:  Not Tested    ADDITIONAL ACTIVITIES:  Therapeutic Exercise: -completed 1 set x 10 reps of R UE strengthening with 1# free wt with rest breaks while seated up in bed to increase UB strength for functional transfers.      Modified Alex:  Current Functional Status:  Not Applicable    Education:  Learners: Patient and Family  Home Exercise Program, Precautions:  , Reviewed Prior Education, Importance of Increasing Activity, OT POC, and Role of OT    ASSESSMENT:     Activity Tolerance:  Patient tolerance of  treatment: Fair treatment tolerance      Plan: Times Per Week: 5x  Current Treatment Recommendations: Functional mobility training, Balance training, Self-Care / ADL, Safety education & training, Endurance training    Goals  Short Term Goals  Time Frame for Short Term Goals: until discharge  Short Term Goal 1: Pt will tolerate sitting EOB 6-8 min with CGA for increased ease of EOB ADLs  Short Term Goal 2: Pt will complete grooming tasks with min A & min vcs for safety  Short Term Goal 3: Pt will tolerate further assessment of functional t/fs by OTR when appropriate  Long Term Goals  Time Frame for Long Term Goals : No LTG set d/t short ELOS    Following session, patient left in safe position in bed and call light within reach       
depletion  : better after HD.    She was on isotonic fluids yesterday because of her calcium.  Since that is better ok to switch to hypotonic fluids today.  Also her free water flushes were not running as ordered yesterday.  Discussed with RN  Labs q 6 hours.   Sodium down 7  in 24 hour period ( 158 to 151)  Altered mental status  Abnormal head CT  Hx CVA with asphasia and PEG tube  DM  Chronic hypotension on Midodrine      D/W patient and RN    Betty Figueroa, DO  [facility]    This report has been created using voice recognition software. It may contain minor errors which are inherent in voice recognition technology   
yesterday but to leave today due to transportation issues.  Started amlodipine today for blood pressure control and RN notified.  Discussed with mother at bedside.  Patient appears comfortable.    [facility] ([facility]illed Nursing Facility)       Assessment:    Principal Problem:    ADAM (acute kidney injury)  Active Problems:    Type 2 diabetes mellitus with insulin therapy (HCC)    Hypernatremia    Hypokalemia    Metabolic acidosis    Hyperglycemia    Adrenal insufficiency    Acute kidney insufficiency    Type 2 diabetes mellitus with hyperglycemia, with long-term current use of insulin (HCC)    Hypotension    Bandemia    Anemia    Diabetes insipidus    Hemothorax on left    Acute blood loss anemia    Atelectasis of left lung    Hyponatremia    Leukocytosis    Sinus bradycardia    Hypophosphatemia    Pulmonary nodules    Mediastinal adenopathy    Metabolic encephalopathy    Elevated LFTs    Abnormal thyroid function test    Dysphagia    History of CVA with residual deficit    Meningioma (HCC)    Splenomegaly    Physical debility    Thrombocytopenia    Primary central diabetes insipidus  Resolved Problems:    * No resolved hospital problems. *  Hx CVA with asphasia and PEG tube.  Left lung hemothorax s/p chest tube   Central diabetes insipidus: presented with hypernatrermia and polyuria.   S/p Pamidronate 90 mg on 6/16. Calcium has normalized.   ADAM on CKD, improved. s/p dialysis.   H/o meningioma. Evaluated by neurosurg. No intervention planned.  Normal PTH of 17 on 6/12/2025.  PTH related peptide 6.0 H on 6/12/2025  Vitamin D, 1, 25 dihyd 143 H on 6/12/25.  Normal iron saturation of 30 on 6/24/25  Low free T3 of 1.15 on 6/19/2025  Low free T4 of 0.4 on 6/25/2025  Low free T4 by dialysis of 0.8 on 6/25/25  Adrenal insufficiency  G tube exchange 6/27/2025      Plan:    Start amlodipine 5 mg daily.  Cortef 10 mg po bid per endocrine at CT.  On DDAVP.  BMP and ionized calcium in 1 week.  Referral to 
Predictors:  Limited Mobility, Chronic Medical Issues/Pertinent Co-Morbidities, and Impaired Cough Function  Functional Oral Intake Scale: Tube Dependent: 2.  Tube dependent with minimal/inconsistent oral intake   Rehabilitation Potential: good    EDUCATION:  Learner: Patient  Education:  Reviewed results and recommendations of this evaluation, Reviewed diet and strategies, Reviewed ST goals and Plan of Care, and Education Related to Health Promotion and Wellness  Evaluation of Education: Verbalizes understanding and Needs further instruction    PLAN:  Skilled SLP intervention on acute care 1-3 x per week or until goals met and or patient plateaus in function.  Specific interventions for next session may include: dietary analysis, pharyngeal strengthening    PATIENT GOAL:    Did not state.  Will further assess during treatment.    SHORT TERM GOALS:  Short Term Goals  Time Frame for Short Term Goals: 2 weeks  Goal 1: Patient will consume puree diet/thin liquids via staw + chin tuck with stable pulmonary status and incorporation of trained compensatory strategies to assist with nutrition/hydration measures  Goal 2: Patient will complete pharyngeal strengthening exercises x15 with good success to improve airway protection and decrease pharyngeal residue.  INTERVENTIONS: Following completion of MBS, ST defined laryngeal penetration and tracheal aspiration, reviewed s/s of tracheal aspiration, and explained risk factors associated with tracheal aspiration (pneumonia, pulmonary decline, respiratory failure, extended/frequent hospitalizations, etc). ST then reviewed results from MBS. ST discussed diet recommendations and recommended swallow strategies to improve safety and efficiency. ST provided skilled education re: rationale for pharyngeal strengthening exercises and proper technique to improve pharyngeal swallow function and airway protection. Patient stated understanding and was in agreement with POC moving forward. 
[] Rehab       [] Psych       [x] SNF       [] Long Term Care Facility       [] Other-    Chief Complaint: Fatigue and AMS    Hospital Course:   Per HPI, \" Radha Yousif is a 44-year-old female with past medical history of depression, IDDM 2, HLD, chronic hypotension, GERD, meningioma, hx of CVA, OCD, osteoarthritis, who presented from [facility] with severe hypercalcemia of 17 and creatinine 4.5. to the [facility] with fatigue, altered mental status, baseline.  Patient was a poor historian on presentation.  As per chart review reported a partial open from the mother, patient has been more fatigued since last Thursday and decreased capacity to communicate/express herself.  NAD blood pressure systolic 130s, pulse 88, afebrile, room air 95%.  Sodium 158, chloride 122, BUN 57, creatinine 4.1, EGFR 13, lactic acid 1.5, anion gap 10.0, calcium 15.3, alkaline phosphatase 144, AST 39, parathyroid hormone 17 WNL, CT brain from outside facility notable for stable meningioma surrounding the left anterior clinoid process and extending anterior to the left temporal lobe.  CT abdomen and pelvis reported moderate splenomegaly, multiple small noncalcified nodules in both lower lung fields, metastasis?  Multinodular infiltrative process in the left lung base which could represent metastasis lesion or an atypical pneumonia infiltrate.  Nephrology/cardiology was consulted.  Patient had emergent IR guided tunneled dialysis catheter placed, and the had dialysis without any fluid removal.  Patient was hypotensive given 25 g of albumin during dialysis.  Treatment was ended.  Per RR note 6/19/25    \"\"Radha Yousif is a 43 yo female with history of DM2, depression, CVA, and is non-verbal.       She was admitted on 6/12/25 with fatigue and AMS.  She was found to have ADAM on CKD and severe, non-PTH mediated hypercalcemia (up to 17.9) that did require emergent dialysis. Nephrology and endocrinology have been following.  Further workup 
[] Rehab       [] Psych       [x] SNF       [] Long Term Care Facility       [] Other-    Chief Complaint: Fatigue and AMS    Hospital Course:   Per HPI, \" Radha Yousif is a 44-year-old female with past medical history of depression, IDDM 2, HLD, chronic hypotension, GERD, meningioma, hx of CVA, OCD, osteoarthritis, who presented from [facility] with severe hypercalcemia of 17 and creatinine 4.5. to the [facility] with fatigue, altered mental status, baseline.  Patient was a poor historian on presentation.  As per chart review reported a partial open from the mother, patient has been more fatigued since last Thursday and decreased capacity to communicate/express herself.  NAD blood pressure systolic 130s, pulse 88, afebrile, room air 95%.  Sodium 158, chloride 122, BUN 57, creatinine 4.1, EGFR 13, lactic acid 1.5, anion gap 10.0, calcium 15.3, alkaline phosphatase 144, AST 39, parathyroid hormone 17 WNL, CT brain from outside facility notable for stable meningioma surrounding the left anterior clinoid process and extending anterior to the left temporal lobe.  CT abdomen and pelvis reported moderate splenomegaly, multiple small noncalcified nodules in both lower lung fields, metastasis?  Multinodular infiltrative process in the left lung base which could represent metastasis lesion or an atypical pneumonia infiltrate.  Nephrology/cardiology was consulted.  Patient had emergent IR guided tunneled dialysis catheter placed, and the had dialysis without any fluid removal.  Patient was hypotensive given 25 g of albumin during dialysis.  Treatment was ended.  Per RR note 6/19/25    \"\"Radha Yousif is a 45 yo female with history of DM2, depression, CVA, and is non-verbal.       She was admitted on 6/12/25 with fatigue and AMS.  She was found to have ADAM on CKD and severe, non-PTH mediated hypercalcemia (up to 17.9) that did require emergent dialysis. Nephrology and endocrinology have been following.  Further workup 
recognition software.  It may contain minor errors which are inherent in voice recognition technology.** Electronically signed by Dr. Dylan Choudhary    MRI BRAIN W WO CONTRAST  Result Date: 6/18/2025  PROCEDURE: MRI BRAIN W WO CONTRAST CLINICAL INFORMATION: concern for neurosarcoidosis. COMPARISON: Brain MRI 5/24/2024 and 12/29/2023. TECHNIQUE: Multiplanar and multiple spin echo T1 and T2-weighted images were obtained through the brain before and after the administration of intravenous contrast. Fast imaging techniques were utilized. FINDINGS: Along the left sphenoid wing, there is stable extra-axial masslike enhancement which extends anterior to the left temporal lobe, along the lateral margin of the left cavernous sinus, surrounds the anterior clinoid process abuts the supraclinoid left internal carotid artery and anteriorly extends along the floor of the anterior cranial fossa along the superior margin of the left orbit. There is motion artifact on the current examination. Allowing for this, this abnormality is stable compared to December 2023. There is no abnormal enhancement in the brain parenchyma. There is a focal enhancing lesion in the right parietal bone. This measures 1.3 x 1.1 cm. This is increased in size compared to December 2023. There is a smaller focal lesion in the left parietal bone measuring 7 mm. There is no abnormal signal on the diffusion weighted images to suggest an acute infarct. The brain volume is reduced.. There is moderate severity volume loss of the cerebellum. There are no intra-or extra-axial collections. There is no hydrocephalus. There is no midline shift. 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 There is evidence of volume loss and an old injury or infarct in the left brachium pontis. On the gradient echo T2-weighted images, there is mineralization in the medial aspects of the basal ganglia.   The major intracranial vascular flow

## 2025-06-30 NOTE — CARE COORDINATION
6/30/25, 10:17 AM EDT    Patient goals/plan/ treatment preferences discussed by  and .  Patient goals/plan/ treatment preferences reviewed with patient/ family.  Patient/ family verbalize understanding of discharge plan and are in agreement with goal/plan/treatment preferences.  Understanding was demonstrated using the teach back method.  AVS provided by RN at time of discharge, which includes all necessary medical information pertaining to the patients current course of illness, treatment, post-discharge goals of care, and treatment preferences.     Services At/After Discharge: Skilled Nursing Facility (SNF), Aide services, In ambulance, Nursing service, OT, and PT       Radha will [facility] today.  She will return under her [facility].  Discharge instructions and transport forms are attached to blue discharge packet.  Spoke with patient and her mother and they are agreeable to discharge plan.  Called Luz at the facility and she is aware transport is set up for 11:30 am.

## 2025-06-30 NOTE — PLAN OF CARE
PLAN OF CARE UPDATE    Name: Radha Yousif  : 1980  MRN: [id number]  Date of Service: 25      12:12 PM:     Patient for transfer to [facility]    Case discussed with Dr Blackburn    Electronically signed by Elvia Moura MD on 2025 at 12:12 PM   
  Problem: Chronic Conditions and Co-morbidities  Goal: Patient's chronic conditions and co-morbidity symptoms are monitored and maintained or improved  6/15/2025 1145 by Macarena Snowden RN  Outcome: Progressing  Flowsheets (Taken 6/15/2025 0123 by Briana Grubbs, RN)  Care Plan - Patient's Chronic Conditions and Co-Morbidity Symptoms are Monitored and Maintained or Improved: Monitor and assess patient's chronic conditions and comorbid symptoms for stability, deterioration, or improvement  6/15/2025 0123 by Briana Grubbs RN  Outcome: Progressing  Flowsheets  Taken 6/15/2025 0123  Care Plan - Patient's Chronic Conditions and Co-Morbidity Symptoms are Monitored and Maintained or Improved: Monitor and assess patient's chronic conditions and comorbid symptoms for stability, deterioration, or improvement  Taken 6/14/2025 2000  Care Plan - Patient's Chronic Conditions and Co-Morbidity Symptoms are Monitored and Maintained or Improved: Monitor and assess patient's chronic conditions and comorbid symptoms for stability, deterioration, or improvement     Problem: Skin/Tissue Integrity  Goal: Skin integrity remains intact  Description: 1.  Monitor for areas of redness and/or skin breakdown2.  Assess vascular access sites hourly3.  Every 4-6 hours minimum:  Change oxygen saturation probe site4.  Every 4-6 hours:  If on nasal continuous positive airway pressure, respiratory therapy assess nares and determine need for appliance change or resting period  6/15/2025 1145 by Macarena Snowden RN  Outcome: Progressing  Flowsheets (Taken 6/15/2025 0123 by Briana Grubbs, RN)  Skin Integrity Remains Intact: Monitor for areas of redness and/or skin breakdown  6/15/2025 0123 by Briana Grubbs, RN  Outcome: Progressing  Flowsheets  Taken 6/15/2025 0123  Skin Integrity Remains Intact: Monitor for areas of redness and/or skin breakdown  Taken 6/14/2025 2000  Skin Integrity Remains Intact: Monitor for areas of redness and/or skin 
  Problem: Chronic Conditions and Co-morbidities  Goal: Patient's chronic conditions and co-morbidity symptoms are monitored and maintained or improved  6/16/2025 0040 by Benjamín Villalta RN  Outcome: Progressing  Flowsheets (Taken 6/15/2025 2015)  Care Plan - Patient's Chronic Conditions and Co-Morbidity Symptoms are Monitored and Maintained or Improved:   Monitor and assess patient's chronic conditions and comorbid symptoms for stability, deterioration, or improvement   Collaborate with multidisciplinary team to address chronic and comorbid conditions and prevent exacerbation or deterioration   Update acute care plan with appropriate goals if chronic or comorbid symptoms are exacerbated and prevent overall improvement and discharge  6/15/2025 1145 by Macarena Snowden RN  Outcome: Progressing  Flowsheets (Taken 6/15/2025 0123 by Briana Grubbs, RN)  Care Plan - Patient's Chronic Conditions and Co-Morbidity Symptoms are Monitored and Maintained or Improved: Monitor and assess patient's chronic conditions and comorbid symptoms for stability, deterioration, or improvement     Problem: Skin/Tissue Integrity  Goal: Skin integrity remains intact  Description: 1.  Monitor for areas of redness and/or skin breakdown2.  Assess vascular access sites hourly3.  Every 4-6 hours minimum:  Change oxygen saturation probe site4.  Every 4-6 hours:  If on nasal continuous positive airway pressure, respiratory therapy assess nares and determine need for appliance change or resting period  6/16/2025 0040 by Benjamín Villalta RN  Outcome: Progressing  Flowsheets (Taken 6/15/2025 2015)  Skin Integrity Remains Intact:   Monitor for areas of redness and/or skin breakdown   Assess vascular access sites hourly   Turn and reposition as indicated  6/15/2025 1145 by Macarena Snowden RN  Outcome: Progressing  Flowsheets (Taken 6/15/2025 0123 by Briana Grubbs RN)  Skin Integrity Remains Intact: Monitor for areas of redness and/or skin 
  Problem: Chronic Conditions and Co-morbidities  Goal: Patient's chronic conditions and co-morbidity symptoms are monitored and maintained or improved  6/16/2025 1924 by Benjamín Villalta RN  Outcome: Progressing  Flowsheets (Taken 6/15/2025 2015)  Care Plan - Patient's Chronic Conditions and Co-Morbidity Symptoms are Monitored and Maintained or Improved:   Monitor and assess patient's chronic conditions and comorbid symptoms for stability, deterioration, or improvement   Collaborate with multidisciplinary team to address chronic and comorbid conditions and prevent exacerbation or deterioration   Update acute care plan with appropriate goals if chronic or comorbid symptoms are exacerbated and prevent overall improvement and discharge  6/16/2025 1226 by Macarena Snowden RN  Outcome: Progressing  Flowsheets (Taken 6/15/2025 2015 by Benjamín Villalta RN)  Care Plan - Patient's Chronic Conditions and Co-Morbidity Symptoms are Monitored and Maintained or Improved:   Monitor and assess patient's chronic conditions and comorbid symptoms for stability, deterioration, or improvement   Collaborate with multidisciplinary team to address chronic and comorbid conditions and prevent exacerbation or deterioration   Update acute care plan with appropriate goals if chronic or comorbid symptoms are exacerbated and prevent overall improvement and discharge     Problem: Skin/Tissue Integrity  Goal: Skin integrity remains intact  Description: 1.  Monitor for areas of redness and/or skin breakdown2.  Assess vascular access sites hourly3.  Every 4-6 hours minimum:  Change oxygen saturation probe site4.  Every 4-6 hours:  If on nasal continuous positive airway pressure, respiratory therapy assess nares and determine need for appliance change or resting period  6/16/2025 1924 by Benjamín Villalta RN  Outcome: Progressing  Flowsheets (Taken 6/15/2025 2015)  Skin Integrity Remains Intact:   Monitor for areas of redness and/or skin 
  Problem: Chronic Conditions and Co-morbidities  Goal: Patient's chronic conditions and co-morbidity symptoms are monitored and maintained or improved  6/17/2025 1926 by Benjamín Villalta RN  Outcome: Progressing  Flowsheets (Taken 6/17/2025 1803 by Abdoulaye Foster RN)  Care Plan - Patient's Chronic Conditions and Co-Morbidity Symptoms are Monitored and Maintained or Improved:   Monitor and assess patient's chronic conditions and comorbid symptoms for stability, deterioration, or improvement   Collaborate with multidisciplinary team to address chronic and comorbid conditions and prevent exacerbation or deterioration   Update acute care plan with appropriate goals if chronic or comorbid symptoms are exacerbated and prevent overall improvement and discharge  6/17/2025 1803 by Abdoulaye Foster RN  Outcome: Progressing  Flowsheets (Taken 6/17/2025 1803)  Care Plan - Patient's Chronic Conditions and Co-Morbidity Symptoms are Monitored and Maintained or Improved:   Monitor and assess patient's chronic conditions and comorbid symptoms for stability, deterioration, or improvement   Collaborate with multidisciplinary team to address chronic and comorbid conditions and prevent exacerbation or deterioration   Update acute care plan with appropriate goals if chronic or comorbid symptoms are exacerbated and prevent overall improvement and discharge     Problem: Skin/Tissue Integrity  Goal: Skin integrity remains intact  Description: 1.  Monitor for areas of redness and/or skin breakdown2.  Assess vascular access sites hourly3.  Every 4-6 hours minimum:  Change oxygen saturation probe site4.  Every 4-6 hours:  If on nasal continuous positive airway pressure, respiratory therapy assess nares and determine need for appliance change or resting period  6/17/2025 1926 by Benjamín Villalta, RN  Outcome: Progressing  Flowsheets (Taken 6/17/2025 1803 by Abdoulaye Foster RN)  Skin Integrity Remains Intact:   Monitor for areas of redness 
  Problem: Chronic Conditions and Co-morbidities  Goal: Patient's chronic conditions and co-morbidity symptoms are monitored and maintained or improved  6/22/2025 1523 by Hilda Maxwell RN  Outcome: Progressing  Flowsheets (Taken 6/22/2025 0323 by Marissa Hurtado RN)  Care Plan - Patient's Chronic Conditions and Co-Morbidity Symptoms are Monitored and Maintained or Improved: Monitor and assess patient's chronic conditions and comorbid symptoms for stability, deterioration, or improvement  6/22/2025 0323 by Marissa Hurtado RN  Outcome: Progressing  Flowsheets (Taken 6/22/2025 0323)  Care Plan - Patient's Chronic Conditions and Co-Morbidity Symptoms are Monitored and Maintained or Improved: Monitor and assess patient's chronic conditions and comorbid symptoms for stability, deterioration, or improvement     Problem: Skin/Tissue Integrity  Goal: Skin integrity remains intact  Description: 1.  Monitor for areas of redness and/or skin breakdown2.  Assess vascular access sites hourly3.  Every 4-6 hours minimum:  Change oxygen saturation probe site4.  Every 4-6 hours:  If on nasal continuous positive airway pressure, respiratory therapy assess nares and determine need for appliance change or resting period  6/22/2025 1523 by Hilda Maxwell RN  Outcome: Progressing  Flowsheets (Taken 6/22/2025 0323 by Marissa Hurtado RN)  Skin Integrity Remains Intact:   Every 4-6 hours:  If on nasal continuous positive airway pressure, assess nares and determine need for appliance change or resting period   Monitor for areas of redness and/or skin breakdown   Assess vascular access sites hourly  6/22/2025 0323 by Marissa Hurtado, RN  Outcome: Progressing  Flowsheets (Taken 6/22/2025 0323)  Skin Integrity Remains Intact:   Every 4-6 hours:  If on nasal continuous positive airway pressure, assess nares and determine need for appliance change or resting period   Monitor for areas of redness and/or skin breakdown   Assess vascular access 
  Problem: Chronic Conditions and Co-morbidities  Goal: Patient's chronic conditions and co-morbidity symptoms are monitored and maintained or improved  6/23/2025 0224 by Jessica Garvey RN  Outcome: Progressing  Flowsheets (Taken 6/22/2025 0323 by Marissa Hurtado RN)  Care Plan - Patient's Chronic Conditions and Co-Morbidity Symptoms are Monitored and Maintained or Improved: Monitor and assess patient's chronic conditions and comorbid symptoms for stability, deterioration, or improvement  6/22/2025 1523 by Hilda Maxwell RN  Outcome: Progressing  Flowsheets (Taken 6/22/2025 0323 by Marissa Hurtado RN)  Care Plan - Patient's Chronic Conditions and Co-Morbidity Symptoms are Monitored and Maintained or Improved: Monitor and assess patient's chronic conditions and comorbid symptoms for stability, deterioration, or improvement     Problem: Skin/Tissue Integrity  Goal: Skin integrity remains intact  Description: 1.  Monitor for areas of redness and/or skin breakdown2.  Assess vascular access sites hourly3.  Every 4-6 hours minimum:  Change oxygen saturation probe site4.  Every 4-6 hours:  If on nasal continuous positive airway pressure, respiratory therapy assess nares and determine need for appliance change or resting period  6/23/2025 0224 by Jessica Garvey RN  Flowsheets (Taken 6/22/2025 0323 by Marissa Hurtado RN)  Skin Integrity Remains Intact:   Every 4-6 hours:  If on nasal continuous positive airway pressure, assess nares and determine need for appliance change or resting period   Monitor for areas of redness and/or skin breakdown   Assess vascular access sites hourly  6/22/2025 1523 by Hilda Maxwell RN  Outcome: Progressing  Flowsheets (Taken 6/22/2025 0323 by Marissa Hurtado RN)  Skin Integrity Remains Intact:   Every 4-6 hours:  If on nasal continuous positive airway pressure, assess nares and determine need for appliance change or resting period   Monitor for areas of redness and/or skin 
  Problem: Chronic Conditions and Co-morbidities  Goal: Patient's chronic conditions and co-morbidity symptoms are monitored and maintained or improved  Outcome: Progressing     Problem: Skin/Tissue Integrity  Goal: Skin integrity remains intact  Description: 1.  Monitor for areas of redness and/or skin breakdown2.  Assess vascular access sites hourly3.  Every 4-6 hours minimum:  Change oxygen saturation probe site4.  Every 4-6 hours:  If on nasal continuous positive airway pressure, respiratory therapy assess nares and determine need for appliance change or resting period  Outcome: Progressing     Problem: Safety - Adult  Goal: Free from fall injury  Outcome: Progressing     Problem: Nutrition Deficit:  Goal: Optimize nutritional status  6/20/2025 2107 by Ana Paula Jorgensen RN  Outcome: Progressing  6/20/2025 1319 by Thelma White RD  Outcome: Progressing  Flowsheets (Taken 6/20/2025 1319)  Nutrient intake appropriate for improving, restoring, or maintaining nutritional needs:   Assess nutritional status and recommend course of action   Monitor oral intake, labs, and treatment plans   Recommend appropriate diets, oral nutritional supplements, and vitamin/mineral supplements   Recommend, monitor, and adjust tube feedings and TPN/PPN based on assessed needs     Problem: Discharge Planning  Goal: Discharge to home or other facility with appropriate resources  Outcome: Progressing     Problem: Pain  Goal: Verbalizes/displays adequate comfort level or baseline comfort level  Outcome: Progressing     
  Problem: Chronic Conditions and Co-morbidities  Goal: Patient's chronic conditions and co-morbidity symptoms are monitored and maintained or improved  Outcome: Progressing  Flowsheets  Taken 6/15/2025 0123  Care Plan - Patient's Chronic Conditions and Co-Morbidity Symptoms are Monitored and Maintained or Improved: Monitor and assess patient's chronic conditions and comorbid symptoms for stability, deterioration, or improvement  Taken 6/14/2025 2000  Care Plan - Patient's Chronic Conditions and Co-Morbidity Symptoms are Monitored and Maintained or Improved: Monitor and assess patient's chronic conditions and comorbid symptoms for stability, deterioration, or improvement     Problem: Skin/Tissue Integrity  Goal: Skin integrity remains intact  Description: 1.  Monitor for areas of redness and/or skin breakdown2.  Assess vascular access sites hourly3.  Every 4-6 hours minimum:  Change oxygen saturation probe site4.  Every 4-6 hours:  If on nasal continuous positive airway pressure, respiratory therapy assess nares and determine need for appliance change or resting period  Outcome: Progressing  Flowsheets  Taken 6/15/2025 0123  Skin Integrity Remains Intact: Monitor for areas of redness and/or skin breakdown  Taken 6/14/2025 2000  Skin Integrity Remains Intact: Monitor for areas of redness and/or skin breakdown     Problem: Safety - Adult  Goal: Free from fall injury  Outcome: Progressing  Flowsheets (Taken 6/15/2025 0123)  Free From Fall Injury: Instruct family/caregiver on patient safety     Problem: Nutrition Deficit:  Goal: Optimize nutritional status  Outcome: Progressing  Flowsheets (Taken 6/15/2025 0123)  Nutrient intake appropriate for improving, restoring, or maintaining nutritional needs: Assess nutritional status and recommend course of action     Problem: Discharge Planning  Goal: Discharge to home or other facility with appropriate resources  Outcome: Progressing  Flowsheets  Taken 6/15/2025 
  Problem: Chronic Conditions and Co-morbidities  Goal: Patient's chronic conditions and co-morbidity symptoms are monitored and maintained or improved  Outcome: Progressing  Flowsheets (Taken 6/13/2025 0102)  Care Plan - Patient's Chronic Conditions and Co-Morbidity Symptoms are Monitored and Maintained or Improved:   Monitor and assess patient's chronic conditions and comorbid symptoms for stability, deterioration, or improvement   Collaborate with multidisciplinary team to address chronic and comorbid conditions and prevent exacerbation or deterioration   Update acute care plan with appropriate goals if chronic or comorbid symptoms are exacerbated and prevent overall improvement and discharge     Problem: Skin/Tissue Integrity  Goal: Skin integrity remains intact  Description: 1.  Monitor for areas of redness and/or skin breakdown2.  Assess vascular access sites hourly3.  Every 4-6 hours minimum:  Change oxygen saturation probe site4.  Every 4-6 hours:  If on nasal continuous positive airway pressure, respiratory therapy assess nares and determine need for appliance change or resting period  Outcome: Progressing  Flowsheets (Taken 6/13/2025 0102)  Skin Integrity Remains Intact:   Monitor for areas of redness and/or skin breakdown   Turn and reposition as indicated   Check visual cues for pain     Problem: Safety - Adult  Goal: Free from fall injury  Outcome: Progressing  Flowsheets (Taken 6/13/2025 0102)  Free From Fall Injury:   Instruct family/caregiver on patient safety   Based on caregiver fall risk screen, instruct family/caregiver to ask for assistance with transferring infant if caregiver noted to have fall risk factors     Problem: Nutrition Deficit:  Goal: Optimize nutritional status  Outcome: Progressing  Flowsheets (Taken 6/13/2025 0102)  Nutrient intake appropriate for improving, restoring, or maintaining nutritional needs:   Assess nutritional status and recommend course of action   Monitor oral 
  Problem: Chronic Conditions and Co-morbidities  Goal: Patient's chronic conditions and co-morbidity symptoms are monitored and maintained or improved  Outcome: Progressing  Flowsheets (Taken 6/14/2025 0640)  Care Plan - Patient's Chronic Conditions and Co-Morbidity Symptoms are Monitored and Maintained or Improved:   Monitor and assess patient's chronic conditions and comorbid symptoms for stability, deterioration, or improvement   Collaborate with multidisciplinary team to address chronic and comorbid conditions and prevent exacerbation or deterioration   Update acute care plan with appropriate goals if chronic or comorbid symptoms are exacerbated and prevent overall improvement and discharge     Problem: Skin/Tissue Integrity  Goal: Skin integrity remains intact  Description: 1.  Monitor for areas of redness and/or skin breakdown2.  Assess vascular access sites hourly3.  Every 4-6 hours minimum:  Change oxygen saturation probe site4.  Every 4-6 hours:  If on nasal continuous positive airway pressure, respiratory therapy assess nares and determine need for appliance change or resting period  Outcome: Progressing  Flowsheets (Taken 6/14/2025 0640)  Skin Integrity Remains Intact:   Monitor for areas of redness and/or skin breakdown   Turn and reposition as indicated   Assess need for specialty bed   Positioning devices   Monitor skin under medical devices   Pressure redistribution bed/mattress (bed type)     Problem: Safety - Adult  Goal: Free from fall injury  Outcome: Progressing  Flowsheets (Taken 6/14/2025 0640)  Free From Fall Injury:   Instruct family/caregiver on patient safety   Based on caregiver fall risk screen, instruct family/caregiver to ask for assistance with transferring infant if caregiver noted to have fall risk factors     Problem: Nutrition Deficit:  Goal: Optimize nutritional status  Outcome: Progressing  Flowsheets (Taken 6/14/2025 0640)  Nutrient intake appropriate for improving, restoring, 
  Problem: Chronic Conditions and Co-morbidities  Goal: Patient's chronic conditions and co-morbidity symptoms are monitored and maintained or improved  Outcome: Progressing  Flowsheets (Taken 6/22/2025 0323 by Marissa Hurtado, RN)  Care Plan - Patient's Chronic Conditions and Co-Morbidity Symptoms are Monitored and Maintained or Improved: Monitor and assess patient's chronic conditions and comorbid symptoms for stability, deterioration, or improvement     Problem: Skin/Tissue Integrity  Goal: Skin integrity remains intact  Description: 1.  Monitor for areas of redness and/or skin breakdown2.  Assess vascular access sites hourly3.  Every 4-6 hours minimum:  Change oxygen saturation probe site4.  Every 4-6 hours:  If on nasal continuous positive airway pressure, respiratory therapy assess nares and determine need for appliance change or resting period  Outcome: Progressing  Flowsheets (Taken 6/23/2025 1156 by Milagros Mancuso, RN)  Skin Integrity Remains Intact: Monitor for areas of redness and/or skin breakdown     Problem: Safety - Adult  Goal: Free from fall injury  Outcome: Progressing  Flowsheets (Taken 6/23/2025 1156 by Milagros Mancuso, RN)  Free From Fall Injury: Instruct family/caregiver on patient safety     Problem: Nutrition Deficit:  Goal: Optimize nutritional status  Outcome: Progressing  Flowsheets (Taken 6/22/2025 0323 by Marissa Hurtado, RN)  Nutrient intake appropriate for improving, restoring, or maintaining nutritional needs:   Assess nutritional status and recommend course of action   Recommend, monitor, and adjust tube feedings and TPN/PPN based on assessed needs     Problem: Discharge Planning  Goal: Discharge to home or other facility with appropriate resources  Outcome: Progressing  Flowsheets (Taken 6/20/2025 0141 by Marleny Green, RN)  Discharge to home or other facility with appropriate resources: Identify barriers to discharge with patient and caregiver     Problem: Pain  Goal: 
  Problem: Chronic Conditions and Co-morbidities  Goal: Patient's chronic conditions and co-morbidity symptoms are monitored and maintained or improved  Outcome: Progressing  Flowsheets (Taken 6/22/2025 0323)  Care Plan - Patient's Chronic Conditions and Co-Morbidity Symptoms are Monitored and Maintained or Improved: Monitor and assess patient's chronic conditions and comorbid symptoms for stability, deterioration, or improvement     Problem: Skin/Tissue Integrity  Goal: Skin integrity remains intact  Description: 1.  Monitor for areas of redness and/or skin breakdown2.  Assess vascular access sites hourly3.  Every 4-6 hours minimum:  Change oxygen saturation probe site4.  Every 4-6 hours:  If on nasal continuous positive airway pressure, respiratory therapy assess nares and determine need for appliance change or resting period  Outcome: Progressing  Flowsheets (Taken 6/22/2025 0323)  Skin Integrity Remains Intact:   Every 4-6 hours:  If on nasal continuous positive airway pressure, assess nares and determine need for appliance change or resting period   Monitor for areas of redness and/or skin breakdown   Assess vascular access sites hourly     Problem: Safety - Adult  Goal: Free from fall injury  Outcome: Progressing  Flowsheets (Taken 6/22/2025 0323)  Free From Fall Injury: Instruct family/caregiver on patient safety     Problem: Nutrition Deficit:  Goal: Optimize nutritional status  Outcome: Progressing  Flowsheets (Taken 6/22/2025 0323)  Nutrient intake appropriate for improving, restoring, or maintaining nutritional needs:   Assess nutritional status and recommend course of action   Recommend, monitor, and adjust tube feedings and TPN/PPN based on assessed needs     Problem: Pain  Goal: Verbalizes/displays adequate comfort level or baseline comfort level  Outcome: Progressing  Flowsheets (Taken 6/22/2025 0323)  Verbalizes/displays adequate comfort level or baseline comfort level:   Encourage patient to monitor 
  Problem: Discharge Planning  Goal: Discharge to home or other facility with appropriate resources  6/13/2025 1217 by Lydia Moyer LSW  Outcome: Progressing   SW consult received. See SW note 6/13/25.  
  Problem: Safety - Adult  Goal: Free from fall injury  6/24/2025 1113 by Milagros Mancuso RN  Outcome: Progressing  Flowsheets (Taken 6/24/2025 1113)  Free From Fall Injury: Instruct family/caregiver on patient safety     Problem: Discharge Planning  Goal: Discharge to home or other facility with appropriate resources  6/24/2025 1113 by Milagros Mancuso RN  Outcome: Progressing  Flowsheets (Taken 6/24/2025 1113)  Discharge to home or other facility with appropriate resources:   Arrange for needed discharge resources and transportation as appropriate   Identify barriers to discharge with patient and caregiver     Problem: Pain  Goal: Verbalizes/displays adequate comfort level or baseline comfort level  6/24/2025 1113 by Milagros Mancuso RN  Outcome: Progressing  Flowsheets (Taken 6/24/2025 1113)  Verbalizes/displays adequate comfort level or baseline comfort level:   Encourage patient to monitor pain and request assistance   Assess pain using appropriate pain scale   Administer analgesics based on type and severity of pain and evaluate response   Implement non-pharmacological measures as appropriate and evaluate response     Problem: Respiratory - Adult  Goal: Achieves optimal ventilation and oxygenation  6/24/2025 1113 by Milagros Mancuso RN  Outcome: Progressing  Flowsheets (Taken 6/24/2025 1113)  Achieves optimal ventilation and oxygenation:   Assess for changes in respiratory status   Assess for changes in mentation and behavior   Assess and instruct to report shortness of breath or any respiratory difficulty   Encourage broncho-pulmonary hygiene including cough, deep breathe, incentive spirometry     Problem: Metabolic/Fluid and Electrolytes - Adult  Goal: Electrolytes maintained within normal limits  6/24/2025 1113 by Milagros Mancuso RN  Outcome: Progressing  Flowsheets (Taken 6/24/2025 1113)  Electrolytes maintained within normal limits:   Monitor labs and assess patient for signs and symptoms of electrolyte imbalances   
  Problem: Safety - Adult  Goal: Free from fall injury  Outcome: Progressing  Flowsheets (Taken 6/20/2025 0141)  Free From Fall Injury: Instruct family/caregiver on patient safety     Problem: Discharge Planning  Goal: Discharge to home or other facility with appropriate resources  Outcome: Progressing  Flowsheets (Taken 6/20/2025 0141)  Discharge to home or other facility with appropriate resources: Identify barriers to discharge with patient and caregiver     Problem: Pain  Goal: Verbalizes/displays adequate comfort level or baseline comfort level  Outcome: Progressing  Flowsheets (Taken 6/20/2025 0141)  Verbalizes/displays adequate comfort level or baseline comfort level:   Encourage patient to monitor pain and request assistance   Assess pain using appropriate pain scale     
  Problem: Skin/Tissue Integrity  Goal: Skin integrity remains intact  Description: 1.  Monitor for areas of redness and/or skin breakdown2.  Assess vascular access sites hourly3.  Every 4-6 hours minimum:  Change oxygen saturation probe site4.  Every 4-6 hours:  If on nasal continuous positive airway pressure, respiratory therapy assess nares and determine need for appliance change or resting period  6/23/2025 1156 by Milagros Mancuso RN  Outcome: Progressing  Flowsheets (Taken 6/23/2025 1156)  Skin Integrity Remains Intact: Monitor for areas of redness and/or skin breakdown     Problem: Safety - Adult  Goal: Free from fall injury  6/23/2025 1156 by Milagros Mancuso, RN  Outcome: Progressing  Flowsheets (Taken 6/23/2025 1156)  Free From Fall Injury: Instruct family/caregiver on patient safety     Problem: Pain  Goal: Verbalizes/displays adequate comfort level or baseline comfort level  6/23/2025 1156 by Milagros Mancuso RN  Outcome: Progressing  Flowsheets (Taken 6/23/2025 1156)  Verbalizes/displays adequate comfort level or baseline comfort level:   Encourage patient to monitor pain and request assistance   Assess pain using appropriate pain scale   Administer analgesics based on type and severity of pain and evaluate response   Implement non-pharmacological measures as appropriate and evaluate response     Problem: Respiratory - Adult  Goal: Achieves optimal ventilation and oxygenation  Outcome: Progressing  Flowsheets (Taken 6/23/2025 1157)  Achieves optimal ventilation and oxygenation:   Assess for changes in respiratory status   Position to facilitate oxygenation and minimize respiratory effort   Assess and instruct to report shortness of breath or any respiratory difficulty     Problem: Metabolic/Fluid and Electrolytes - Adult  Goal: Electrolytes maintained within normal limits  Outcome: Progressing  Flowsheets (Taken 6/23/2025 1157)  Electrolytes maintained within normal limits: Monitor labs and assess patient for 
  Problem: Skin/Tissue Integrity  Goal: Skin integrity remains intact  Description: 1.  Monitor for areas of redness and/or skin breakdown2.  Assess vascular access sites hourly3.  Every 4-6 hours minimum:  Change oxygen saturation probe site4.  Every 4-6 hours:  If on nasal continuous positive airway pressure, respiratory therapy assess nares and determine need for appliance change or resting period  Outcome: Progressing  Flowsheets  Taken 6/17/2025 1803  Skin Integrity Remains Intact:   Monitor for areas of redness and/or skin breakdown   Assess vascular access sites hourly  Taken 6/17/2025 1030  Skin Integrity Remains Intact: Monitor for areas of redness and/or skin breakdown     Problem: Safety - Adult  Goal: Free from fall injury  Outcome: Progressing  Flowsheets (Taken 6/17/2025 1803)  Free From Fall Injury: Instruct family/caregiver on patient safety     Problem: Nutrition Deficit:  Goal: Optimize nutritional status  Outcome: Progressing     Problem: Discharge Planning  Goal: Discharge to home or other facility with appropriate resources  Outcome: Progressing  Flowsheets (Taken 6/17/2025 1803)  Discharge to home or other facility with appropriate resources:   Identify discharge learning needs (meds, wound care, etc)   Identify barriers to discharge with patient and caregiver   Arrange for needed discharge resources and transportation as appropriate   Refer to discharge planning if patient needs post-hospital services based on physician order or complex needs related to functional status, cognitive ability or social support system     Problem: Pain  Goal: Verbalizes/displays adequate comfort level or baseline comfort level  Outcome: Progressing  Flowsheets (Taken 6/17/2025 1803)  Verbalizes/displays adequate comfort level or baseline comfort level:   Assess pain using appropriate pain scale   Implement non-pharmacological measures as appropriate and evaluate response   Administer analgesics based on type and 
  Problem: Skin/Tissue Integrity  Goal: Skin integrity remains intact  Description: 1.  Monitor for areas of redness and/or skin breakdown2.  Assess vascular access sites hourly3.  Every 4-6 hours minimum:  Change oxygen saturation probe site4.  Every 4-6 hours:  If on nasal continuous positive airway pressure, respiratory therapy assess nares and determine need for appliance change or resting period  Outcome: Progressing  Flowsheets (Taken 6/29/2025 2327)  Skin Integrity Remains Intact: Monitor for areas of redness and/or skin breakdown     Problem: Safety - Adult  Goal: Free from fall injury  Outcome: Progressing  Flowsheets (Taken 6/29/2025 2327)  Free From Fall Injury: Instruct family/caregiver on patient safety     Problem: Discharge Planning  Goal: Discharge to home or other facility with appropriate resources  Outcome: Progressing  Flowsheets (Taken 6/29/2025 2327)  Discharge to home or other facility with appropriate resources:   Identify barriers to discharge with patient and caregiver   Arrange for needed discharge resources and transportation as appropriate   Identify discharge learning needs (meds, wound care, etc)   Refer to discharge planning if patient needs post-hospital services based on physician order or complex needs related to functional status, cognitive ability or social support system     Problem: Pain  Goal: Verbalizes/displays adequate comfort level or baseline comfort level  Outcome: Progressing  Flowsheets (Taken 6/29/2025 2327)  Verbalizes/displays adequate comfort level or baseline comfort level: Encourage patient to monitor pain and request assistance     Problem: Respiratory - Adult  Goal: Achieves optimal ventilation and oxygenation  Outcome: Progressing  Flowsheets (Taken 6/29/2025 2327)  Achieves optimal ventilation and oxygenation: Assess for changes in respiratory status     Problem: Metabolic/Fluid and Electrolytes - Adult  Goal: Electrolytes maintained within normal 
Discussed all results with her and her mother and let them know that malignancy workup was negative. She can follow up with endocrinology for her hypercalcemia and with her PCP for the hypo-densities seen on spleen US.  They will not need to follow up with Oncology after discharge.    I answered all her and her mother's questions.    Oncology will sign off at this time.  Please feel free to contact us with any questions or concern.     Jia Clark, APRN - CNP        
Patient admitted early this morning for acute renal failure and hypercalcemia    Patient with past medical history of CVA with left-sided deficits and aphasia, diabetes mellitus type 2, dysphagia with PEG tube, neurogenic bladder, meningioma    Nephrology consulted.  Recently established with Dr. Figueroa outpatient.    Calcitonin ordered.  IV fluid hydration.  Serum protein electrophoresis, PTH intact, PTH RP, vitamin D  Bazzi catheter was placed at OSH.  Monitor strict I's and O's  Dietitian consulted for tube feeding management.  Add free water flushes    Patient with past medical history of CVA with left-sided deficits and aphasia, diabetes mellitus type 2, dysphagia with PEG tube, neurogenic bladder, meningioma    CT of the brain done at OSH demonstrating some lucency in the right parietal region possible for metastatic disease.  Patient does have a history of left-sided meningioma and follows with neurosurgery regularly.  Last appointment 6 months ago and MRI imaging at that time was deemed stable  We will consult neurosurgery for their recommendations    Underlying malignancy?  Discussed case with Dr. Figueroa in detail.  Calcium without any improvement-will proceed with dialysis today.  Agrees with transfer to stepdown.  Discussed differential diagnosis which includes malignancy, sarcoidosis.  Workup is pending  
Patient admitted to ICU with hypotension, found to have large left sided pleural effusion. CT chest 4 days prior showed no effusion. S /p Bronchoscopy performed 6/15 with LN biopsy. Chest tube was placed today, drained >2 litres of bloody effusion. Hb upon admission was 5, transfused 2 units. Trend H&H and fibrinogen.   Wbc 25, Septic workup pending, on zosyn, vanc and hydrocortisone.   Patient was also found to have DKA with glucose 400, pH 7.2, HC03 15, and BOH 3.3. She was started on  ml/hr  and insulin infusion. Trend AG, HC03, and glucose. Hyponatremia corrected sodium 132, on NS infusion, follow sodium.  ADAM S cr 1.7, increasing from 1.1. Likely prerenal, trend bmp  
Patient is a 44-year-old female with a past medical history of stroke with residual dysphagia status post PEG tube placement and left hemiaplasia, chronic kidney disease, adrenal insufficiency, and diabetes mellitus.  She was admitted initially to the hospitalist service for acute kidney injury and hypercalcemia.  She was initiated on dialysis.  See the imaging of the patient's chest revealed innumerable nodular densities seen throughout the mid and lower lung fields.  Pulmonology was consulted and there was question of possible sarcoidosis.  As such, the patient underwent bronchoscopy with BAL and EBUS.  The days following the procedure, the patient was noted to have an episode of hypotension.  Her hemoglobin was noted to be low.  She required transfer to the intensive care unit for vasopressors.  She was noted to have a large left pleural effusion and a chest tube was placed.  There was noted to be bloody fluid returning from the chest tube, so this was felt to be hemothorax secondary to EBUS.  The patient's hemoglobin is stable today.  Will continue trending every 8 hours.  Consult pulmonology for assistance with chest tube and hemothorax management.    Given the patient's hypotension, her Solu-Cortef was increased to 100 mg 3 times daily.  Endocrinology is following the case.      The patient is currently on 7-day course of Zosyn due to leukocytosis.  Cultures have been negative to this point.      Cardiology has been consulted due to intermittent episodes of bradycardia.  The patient's beta-blocker has been discontinued and an echocardiogram has been ordered.      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  Desmopressin has been given.  IV fluids to be managed by nephrology.  The patient's renal function has improved significantly.    Continue current plan of care.  Case discussed with patient and mother present at bedside.    Electronically signed by Marco Gan PA-C on 6/22/25 at 
appropriate diets, oral nutritional supplements, and vitamin/mineral supplements   Recommend, monitor, and adjust tube feedings and TPN/PPN based on assessed needs     Problem: Discharge Planning  Goal: Discharge to home or other facility with appropriate resources  Outcome: Progressing  Flowsheets (Taken 6/17/2025 2025 by Benjamín Villalta, RN)  Discharge to home or other facility with appropriate resources:   Identify barriers to discharge with patient and caregiver   Arrange for needed discharge resources and transportation as appropriate   Identify discharge learning needs (meds, wound care, etc)   Refer to discharge planning if patient needs post-hospital services based on physician order or complex needs related to functional status, cognitive ability or social support system     Problem: Pain  Goal: Verbalizes/displays adequate comfort level or baseline comfort level  Outcome: Progressing  Flowsheets (Taken 6/17/2025 1803 by Abdoulaye Foster, RN)  Verbalizes/displays adequate comfort level or baseline comfort level:   Assess pain using appropriate pain scale   Implement non-pharmacological measures as appropriate and evaluate response   Administer analgesics based on type and severity of pain and evaluate response   Reviewed plan of care and all new orders for today. Family verbalized understanding.  
care, etc)   Refer to discharge planning if patient needs post-hospital services based on physician order or complex needs related to functional status, cognitive ability or social support system   Arrange for needed discharge resources and transportation as appropriate     Problem: Pain  Goal: Verbalizes/displays adequate comfort level or baseline comfort level  Outcome: Progressing  Flowsheets (Taken 6/29/2025 0006)  Verbalizes/displays adequate comfort level or baseline comfort level: Encourage patient to monitor pain and request assistance     Problem: Metabolic/Fluid and Electrolytes - Adult  Goal: Electrolytes maintained within normal limits  Outcome: Progressing  Flowsheets (Taken 6/29/2025 0006)  Electrolytes maintained within normal limits: Monitor labs and assess patient for signs and symptoms of electrolyte imbalances     
redness and/or skin breakdown   Assess vascular access sites hourly   Turn and reposition as indicated  6/16/2025 0040 by Benjamín Villalta, RN  Outcome: Progressing  Flowsheets (Taken 6/15/2025 2015)  Skin Integrity Remains Intact:   Monitor for areas of redness and/or skin breakdown   Assess vascular access sites hourly   Turn and reposition as indicated     Problem: Safety - Adult  Goal: Free from fall injury  6/16/2025 1226 by Macarena Snowden RN  Outcome: Progressing  Flowsheets (Taken 6/15/2025 0123 by Briana Grubbs, RN)  Free From Fall Injury: Instruct family/caregiver on patient safety  6/16/2025 0040 by Benjamín Villalta RN  Outcome: Progressing  Flowsheets (Taken 6/15/2025 0123 by Briana Grubbs, RN)  Free From Fall Injury: Instruct family/caregiver on patient safety     Problem: Nutrition Deficit:  Goal: Optimize nutritional status  6/16/2025 1226 by Macarena Snowden RN  Outcome: Progressing  Flowsheets (Taken 6/16/2025 1156 by Sandra Weeks RD, ROBBIE)  Nutrient intake appropriate for improving, restoring, or maintaining nutritional needs:   Assess nutritional status and recommend course of action   Monitor oral intake, labs, and treatment plans   Recommend appropriate diets, oral nutritional supplements, and vitamin/mineral supplements   Recommend, monitor, and adjust tube feedings and TPN/PPN based on assessed needs  6/16/2025 1156 by Sandra Weeks RD, LD  Outcome: Not Progressing  Flowsheets (Taken 6/16/2025 1156)  Nutrient intake appropriate for improving, restoring, or maintaining nutritional needs:   Assess nutritional status and recommend course of action   Monitor oral intake, labs, and treatment plans   Recommend appropriate diets, oral nutritional supplements, and vitamin/mineral supplements   Recommend, monitor, and adjust tube feedings and TPN/PPN based on assessed needs  6/16/2025 0040 by Benjamín Villalta, RN  Outcome: Progressing  Flowsheets (Taken 6/15/2025 0123 by Briana Grubbs 
Double O-Z Flap Text: The defect edges were debeveled with a #15 scalpel blade.  Given the location of the defect, shape of the defect and the proximity to free margins a Double O-Z flap was deemed most appropriate.  Using a sterile surgical marker, an appropriate transposition flap was drawn incorporating the defect and placing the expected incisions within the relaxed skin tension lines where possible. The area thus outlined was incised deep to adipose tissue with a #15 scalpel blade.  The skin margins were undermined to an appropriate distance in all directions utilizing iris scissors.

## 2025-07-03 ENCOUNTER — OFFICE VISIT (OUTPATIENT)
Age: 45
End: 2025-07-03
Payer: COMMERCIAL

## 2025-07-03 VITALS
DIASTOLIC BLOOD PRESSURE: 70 MMHG | HEART RATE: 92 BPM | BODY MASS INDEX: 26.48 KG/M2 | SYSTOLIC BLOOD PRESSURE: 102 MMHG | HEIGHT: 60 IN

## 2025-07-03 DIAGNOSIS — E23.0 PANHYPOPITUITARISM: Primary | ICD-10-CM

## 2025-07-03 DIAGNOSIS — E03.9 ACQUIRED HYPOTHYROIDISM: ICD-10-CM

## 2025-07-03 DIAGNOSIS — E27.40 ADRENAL INSUFFICIENCY: ICD-10-CM

## 2025-07-03 DIAGNOSIS — E23.2 DIABETES INSIPIDUS: ICD-10-CM

## 2025-07-03 PROCEDURE — 99214 OFFICE O/P EST MOD 30 MIN: CPT | Performed by: INTERNAL MEDICINE

## 2025-07-03 RX ORDER — FLUOXETINE HYDROCHLORIDE 40 MG/1
40 CAPSULE ORAL
COMMUNITY
Start: 2025-06-09

## 2025-07-03 NOTE — PROGRESS NOTES
[facility] [place] [facility]  [facility] ENDOCRINOLOGY  [address]  [address]  [facility] [id number]  Dept: [phone]  Loc: [phone]     Visit Date: 7/3/2025    Radha Yousif is a 44 y.o. female who presents today for:  Chief Complaint   Patient presents with    Follow-up     Type 2 diabetes mellitus with hyperglycemia, without long-term current use of insulin ([facility])            Subjective:      HPI   History of Present Illness     44-year-old female with a history of type 2 diabetes mellitus who was recently hospitalized with severe  hypercalcemia found to be due to sarcoidosis.  At that time she was in acute renal failure due to severe volume depletion and was treated with calcitonin as well as pamidronate.  Patient recovered but was also diagnosed with adrenal insufficiency, central hypothyroidism and DI, all felt to be related to sarcoidosis.  A pituitary MRI did not really show any structural lesions but her angiotensin-converting enzyme was elevated.  1, 25-dihydroxy vitamin D level was elevated at the time of admission.  The patient has made substantial recovery since the time of discharge.  She has a PEG tube in place but according to the mother she is eating and only taking supplemental Ensure.  Blood sugars from the nursing home were reviewed and are reasonably controlled.  The patient is nonverbal but is able to communicate using sign language.  Her mother was at the bedside to provide additional history.  She is currently living in a nursing home.           Past Medical History:   Diagnosis Date    CKD (chronic kidney disease)     CVA (cerebral vascular accident) ([facility]) 2022    lt side deficits & aphasia    Depression     DM (diabetes mellitus) ([facility])     Meningioma ([facility])     OCD (obsessive compulsive disorder)     Type II or unspecified type diabetes mellitus without mention of complication, not stated as uncontrolled       Past Surgical History:   Procedure Laterality

## 2025-07-07 LAB — AFB CULTURE & SMEAR: NORMAL

## 2025-07-15 LAB — AFB CULTURE & SMEAR: NORMAL

## 2025-07-21 LAB
FUNGUS SPEC CULT: NORMAL
FUNGUS SPEC FUNGUS STN: NORMAL

## 2025-07-28 LAB — AFB CULTURE & SMEAR: NORMAL

## 2025-08-04 ENCOUNTER — OFFICE VISIT (OUTPATIENT)
Age: 45
End: 2025-08-04
Payer: COMMERCIAL

## 2025-08-04 VITALS
BODY MASS INDEX: 23.6 KG/M2 | SYSTOLIC BLOOD PRESSURE: 102 MMHG | WEIGHT: 120.2 LBS | HEIGHT: 60 IN | HEART RATE: 78 BPM | DIASTOLIC BLOOD PRESSURE: 76 MMHG

## 2025-08-04 DIAGNOSIS — E11.65 TYPE 2 DIABETES MELLITUS WITH HYPERGLYCEMIA, WITHOUT LONG-TERM CURRENT USE OF INSULIN (HCC): ICD-10-CM

## 2025-08-04 DIAGNOSIS — E23.0 PANHYPOPITUITARISM: Primary | ICD-10-CM

## 2025-08-04 DIAGNOSIS — E23.2 DIABETES INSIPIDUS: ICD-10-CM

## 2025-08-04 DIAGNOSIS — E83.52 HYPERCALCEMIA: ICD-10-CM

## 2025-08-04 DIAGNOSIS — E03.9 ACQUIRED HYPOTHYROIDISM: ICD-10-CM

## 2025-08-04 DIAGNOSIS — E27.40 ADRENAL INSUFFICIENCY: ICD-10-CM

## 2025-08-04 LAB — AFB CULTURE & SMEAR: NORMAL

## 2025-08-04 PROCEDURE — 3051F HG A1C>EQUAL 7.0%<8.0%: CPT | Performed by: INTERNAL MEDICINE

## 2025-08-04 PROCEDURE — 99214 OFFICE O/P EST MOD 30 MIN: CPT | Performed by: INTERNAL MEDICINE

## 2025-08-04 RX ORDER — CLOTRIMAZOLE AND BETAMETHASONE DIPROPIONATE 10; .64 MG/G; MG/G
CREAM TOPICAL
COMMUNITY
Start: 2025-07-12

## 2025-08-04 RX ORDER — HYDROCORTISONE 5 MG/1
TABLET ORAL
Qty: 60 TABLET | Refills: 3 | Status: SHIPPED | OUTPATIENT
Start: 2025-08-04

## 2025-08-04 RX ORDER — ACETAMINOPHEN 325 MG/1
TABLET ORAL
COMMUNITY

## 2025-08-04 RX ORDER — POLYETHYLENE GLYCOL 3350 17 G/17G
17 POWDER, FOR SOLUTION ORAL DAILY
COMMUNITY

## 2025-08-04 RX ORDER — ASPIRIN 81 MG/1
81 TABLET, CHEWABLE ORAL DAILY
COMMUNITY

## 2025-08-04 RX ORDER — AMLODIPINE BESYLATE 2.5 MG/1
TABLET ORAL
COMMUNITY
Start: 2025-08-01

## 2025-08-04 RX ORDER — CHLORHEXIDINE GLUCONATE ORAL RINSE 1.2 MG/ML
15 SOLUTION DENTAL 2 TIMES DAILY
COMMUNITY

## 2025-08-18 ENCOUNTER — TELEPHONE (OUTPATIENT)
Age: 45
End: 2025-08-18

## (undated) DEVICE — NEEDLE ASPIR 21GA L700MM US GUID TREAT DST END FOR EFFICIENT